# Patient Record
Sex: FEMALE | Race: WHITE | Employment: UNEMPLOYED | URBAN - METROPOLITAN AREA
[De-identification: names, ages, dates, MRNs, and addresses within clinical notes are randomized per-mention and may not be internally consistent; named-entity substitution may affect disease eponyms.]

---

## 2017-11-30 PROBLEM — M19.90 ARTHRITIS: Status: ACTIVE | Noted: 2017-11-30

## 2017-11-30 PROBLEM — F33.0 MILD EPISODE OF RECURRENT MAJOR DEPRESSIVE DISORDER (HCC): Status: ACTIVE | Noted: 2017-11-30

## 2017-11-30 PROBLEM — J44.9 CHRONIC OBSTRUCTIVE PULMONARY DISEASE (HCC): Status: ACTIVE | Noted: 2017-11-30

## 2018-04-09 ENCOUNTER — TELEPHONE (OUTPATIENT)
Dept: FAMILY MEDICINE CLINIC | Age: 63
End: 2018-04-09

## 2018-04-09 RX ORDER — ALPRAZOLAM 0.25 MG/1
0.25 TABLET ORAL 2 TIMES DAILY PRN
Qty: 60 TABLET | Refills: 2 | Status: CANCELLED | OUTPATIENT
Start: 2018-04-09 | End: 2018-05-09

## 2018-04-12 ENCOUNTER — OFFICE VISIT (OUTPATIENT)
Dept: FAMILY MEDICINE CLINIC | Age: 63
End: 2018-04-12
Payer: MEDICAID

## 2018-04-12 VITALS
WEIGHT: 121 LBS | HEART RATE: 84 BPM | HEIGHT: 67 IN | RESPIRATION RATE: 16 BRPM | SYSTOLIC BLOOD PRESSURE: 151 MMHG | DIASTOLIC BLOOD PRESSURE: 81 MMHG | OXYGEN SATURATION: 91 % | BODY MASS INDEX: 18.99 KG/M2

## 2018-04-12 DIAGNOSIS — F33.0 MILD EPISODE OF RECURRENT MAJOR DEPRESSIVE DISORDER (HCC): Primary | ICD-10-CM

## 2018-04-12 DIAGNOSIS — Z72.0 TOBACCO ABUSE: ICD-10-CM

## 2018-04-12 DIAGNOSIS — Z12.31 ENCOUNTER FOR SCREENING MAMMOGRAM FOR BREAST CANCER: ICD-10-CM

## 2018-04-12 DIAGNOSIS — I10 ESSENTIAL HYPERTENSION: ICD-10-CM

## 2018-04-12 DIAGNOSIS — J44.9 CHRONIC OBSTRUCTIVE PULMONARY DISEASE, UNSPECIFIED COPD TYPE (HCC): ICD-10-CM

## 2018-04-12 DIAGNOSIS — F17.210 NICOTINE DEPENDENCE, CIGARETTES, UNCOMPLICATED: ICD-10-CM

## 2018-04-12 DIAGNOSIS — Z12.11 SCREENING FOR COLON CANCER: ICD-10-CM

## 2018-04-12 PROCEDURE — 3017F COLORECTAL CA SCREEN DOC REV: CPT | Performed by: FAMILY MEDICINE

## 2018-04-12 PROCEDURE — G0296 VISIT TO DETERM LDCT ELIG: HCPCS | Performed by: FAMILY MEDICINE

## 2018-04-12 PROCEDURE — G8427 DOCREV CUR MEDS BY ELIG CLIN: HCPCS | Performed by: FAMILY MEDICINE

## 2018-04-12 PROCEDURE — G8926 SPIRO NO PERF OR DOC: HCPCS | Performed by: FAMILY MEDICINE

## 2018-04-12 PROCEDURE — 3014F SCREEN MAMMO DOC REV: CPT | Performed by: FAMILY MEDICINE

## 2018-04-12 PROCEDURE — 4004F PT TOBACCO SCREEN RCVD TLK: CPT | Performed by: FAMILY MEDICINE

## 2018-04-12 PROCEDURE — 3023F SPIROM DOC REV: CPT | Performed by: FAMILY MEDICINE

## 2018-04-12 PROCEDURE — G8420 CALC BMI NORM PARAMETERS: HCPCS | Performed by: FAMILY MEDICINE

## 2018-04-12 PROCEDURE — 99214 OFFICE O/P EST MOD 30 MIN: CPT | Performed by: FAMILY MEDICINE

## 2018-04-12 RX ORDER — NICOTINE 21 MG/24HR
1 PATCH, TRANSDERMAL 24 HOURS TRANSDERMAL EVERY 24 HOURS
Qty: 30 PATCH | Refills: 3 | Status: SHIPPED | OUTPATIENT
Start: 2018-04-12 | End: 2022-06-18

## 2018-04-12 ASSESSMENT — ENCOUNTER SYMPTOMS
BACK PAIN: 0
SINUS PRESSURE: 0
COUGH: 0
EYE PAIN: 0
ABDOMINAL PAIN: 0
SORE THROAT: 0
SHORTNESS OF BREATH: 0
CONSTIPATION: 0
DIARRHEA: 0

## 2018-04-25 ENCOUNTER — HOSPITAL ENCOUNTER (OUTPATIENT)
Dept: GENERAL RADIOLOGY | Age: 63
Discharge: HOME | End: 2018-04-27
Payer: MEDICAID

## 2018-04-25 DIAGNOSIS — Z12.31 ENCOUNTER FOR SCREENING MAMMOGRAM FOR BREAST CANCER: ICD-10-CM

## 2018-04-25 PROCEDURE — 77063 BREAST TOMOSYNTHESIS BI: CPT

## 2018-07-05 ENCOUNTER — TELEPHONE (OUTPATIENT)
Dept: FAMILY MEDICINE CLINIC | Age: 63
End: 2018-07-05

## 2018-07-05 RX ORDER — ALPRAZOLAM 0.25 MG/1
0.25 TABLET ORAL 2 TIMES DAILY PRN
Qty: 60 TABLET | Refills: 2 | Status: CANCELLED | OUTPATIENT
Start: 2018-07-05 | End: 2018-08-04

## 2019-08-23 ENCOUNTER — TELEPHONE (OUTPATIENT)
Dept: ADMINISTRATIVE | Age: 64
End: 2019-08-23

## 2019-08-23 NOTE — TELEPHONE ENCOUNTER
Spoke with patient to get her scheduled for a COPD check and patient refused stated she does not need to be seen at this time.

## 2022-06-18 ENCOUNTER — APPOINTMENT (OUTPATIENT)
Dept: CT IMAGING | Age: 67
End: 2022-06-18
Payer: MEDICAID

## 2022-06-18 ENCOUNTER — HOSPITAL ENCOUNTER (EMERGENCY)
Age: 67
Discharge: LEFT BEFORE DISCHARGE | End: 2022-06-19
Attending: EMERGENCY MEDICINE
Payer: MEDICAID

## 2022-06-18 ENCOUNTER — APPOINTMENT (OUTPATIENT)
Dept: GENERAL RADIOLOGY | Age: 67
End: 2022-06-18
Payer: MEDICAID

## 2022-06-18 DIAGNOSIS — R09.02 HYPOXIA: ICD-10-CM

## 2022-06-18 DIAGNOSIS — M79.89 RIGHT LEG SWELLING: Primary | ICD-10-CM

## 2022-06-18 DIAGNOSIS — M79.631 PAIN AND SWELLING OF RIGHT FOREARM: ICD-10-CM

## 2022-06-18 DIAGNOSIS — N63.10 BREAST MASS, RIGHT: ICD-10-CM

## 2022-06-18 DIAGNOSIS — M79.89 LEFT LEG SWELLING: ICD-10-CM

## 2022-06-18 DIAGNOSIS — J44.1 COPD EXACERBATION (HCC): ICD-10-CM

## 2022-06-18 DIAGNOSIS — M79.89 PAIN AND SWELLING OF RIGHT FOREARM: ICD-10-CM

## 2022-06-18 LAB
ALBUMIN SERPL-MCNC: 3.7 G/DL (ref 3.5–5.2)
ALP BLD-CCNC: 109 U/L (ref 35–104)
ALT SERPL-CCNC: 104 U/L (ref 0–32)
ANION GAP SERPL CALCULATED.3IONS-SCNC: 9 MMOL/L (ref 7–16)
AST SERPL-CCNC: 49 U/L (ref 0–31)
BASOPHILS ABSOLUTE: 0.06 E9/L (ref 0–0.2)
BASOPHILS RELATIVE PERCENT: 0.6 % (ref 0–2)
BILIRUB SERPL-MCNC: 1.4 MG/DL (ref 0–1.2)
BUN BLDV-MCNC: 23 MG/DL (ref 6–23)
CALCIUM SERPL-MCNC: 9.1 MG/DL (ref 8.6–10.2)
CHLORIDE BLD-SCNC: 93 MMOL/L (ref 98–107)
CO2: 35 MMOL/L (ref 22–29)
CREAT SERPL-MCNC: 1.3 MG/DL (ref 0.5–1)
EOSINOPHILS ABSOLUTE: 0.04 E9/L (ref 0.05–0.5)
EOSINOPHILS RELATIVE PERCENT: 0.4 % (ref 0–6)
GFR AFRICAN AMERICAN: 50
GFR NON-AFRICAN AMERICAN: 41 ML/MIN/1.73
GLUCOSE BLD-MCNC: 94 MG/DL (ref 74–99)
HCT VFR BLD CALC: 54.9 % (ref 34–48)
HEMOGLOBIN: 16.8 G/DL (ref 11.5–15.5)
IMMATURE GRANULOCYTES #: 0.03 E9/L
IMMATURE GRANULOCYTES %: 0.3 % (ref 0–5)
LYMPHOCYTES ABSOLUTE: 2.09 E9/L (ref 1.5–4)
LYMPHOCYTES RELATIVE PERCENT: 21.1 % (ref 20–42)
MCH RBC QN AUTO: 31.2 PG (ref 26–35)
MCHC RBC AUTO-ENTMCNC: 30.6 % (ref 32–34.5)
MCV RBC AUTO: 101.9 FL (ref 80–99.9)
MONOCYTES ABSOLUTE: 1.18 E9/L (ref 0.1–0.95)
MONOCYTES RELATIVE PERCENT: 11.9 % (ref 2–12)
NEUTROPHILS ABSOLUTE: 6.49 E9/L (ref 1.8–7.3)
NEUTROPHILS RELATIVE PERCENT: 65.7 % (ref 43–80)
PDW BLD-RTO: 16.7 FL (ref 11.5–15)
PLATELET # BLD: 176 E9/L (ref 130–450)
PMV BLD AUTO: 9.4 FL (ref 7–12)
POTASSIUM REFLEX MAGNESIUM: 3.6 MMOL/L (ref 3.5–5)
PRO-BNP: ABNORMAL PG/ML (ref 0–125)
RBC # BLD: 5.39 E12/L (ref 3.5–5.5)
SODIUM BLD-SCNC: 137 MMOL/L (ref 132–146)
TOTAL PROTEIN: 7.3 G/DL (ref 6.4–8.3)
TROPONIN, HIGH SENSITIVITY: 78 NG/L (ref 0–9)
TROPONIN, HIGH SENSITIVITY: 85 NG/L (ref 0–9)
WBC # BLD: 9.9 E9/L (ref 4.5–11.5)

## 2022-06-18 PROCEDURE — 2580000003 HC RX 258: Performed by: STUDENT IN AN ORGANIZED HEALTH CARE EDUCATION/TRAINING PROGRAM

## 2022-06-18 PROCEDURE — 99285 EMERGENCY DEPT VISIT HI MDM: CPT

## 2022-06-18 PROCEDURE — 96372 THER/PROPH/DIAG INJ SC/IM: CPT

## 2022-06-18 PROCEDURE — 80053 COMPREHEN METABOLIC PANEL: CPT

## 2022-06-18 PROCEDURE — 71275 CT ANGIOGRAPHY CHEST: CPT

## 2022-06-18 PROCEDURE — 36415 COLL VENOUS BLD VENIPUNCTURE: CPT

## 2022-06-18 PROCEDURE — 83880 ASSAY OF NATRIURETIC PEPTIDE: CPT

## 2022-06-18 PROCEDURE — 84484 ASSAY OF TROPONIN QUANT: CPT

## 2022-06-18 PROCEDURE — 6360000004 HC RX CONTRAST MEDICATION: Performed by: STUDENT IN AN ORGANIZED HEALTH CARE EDUCATION/TRAINING PROGRAM

## 2022-06-18 PROCEDURE — 93005 ELECTROCARDIOGRAM TRACING: CPT | Performed by: EMERGENCY MEDICINE

## 2022-06-18 PROCEDURE — 85025 COMPLETE CBC W/AUTO DIFF WBC: CPT

## 2022-06-18 PROCEDURE — 73630 X-RAY EXAM OF FOOT: CPT

## 2022-06-18 PROCEDURE — 73090 X-RAY EXAM OF FOREARM: CPT

## 2022-06-18 RX ORDER — SODIUM CHLORIDE 0.9 % (FLUSH) 0.9 %
10 SYRINGE (ML) INJECTION PRN
Status: COMPLETED | OUTPATIENT
Start: 2022-06-18 | End: 2022-06-18

## 2022-06-18 RX ADMIN — IOPAMIDOL 75 ML: 755 INJECTION, SOLUTION INTRAVENOUS at 23:24

## 2022-06-18 RX ADMIN — SODIUM CHLORIDE, PRESERVATIVE FREE 10 ML: 5 INJECTION INTRAVENOUS at 23:24

## 2022-06-18 ASSESSMENT — PAIN DESCRIPTION - FREQUENCY: FREQUENCY: INTERMITTENT

## 2022-06-18 ASSESSMENT — PAIN DESCRIPTION - PAIN TYPE: TYPE: ACUTE PAIN

## 2022-06-18 ASSESSMENT — PAIN DESCRIPTION - LOCATION: LOCATION: FOOT

## 2022-06-18 ASSESSMENT — PAIN DESCRIPTION - DESCRIPTORS: DESCRIPTORS: DULL

## 2022-06-18 ASSESSMENT — PAIN SCALES - GENERAL: PAINLEVEL_OUTOF10: 5

## 2022-06-18 ASSESSMENT — PAIN DESCRIPTION - ORIENTATION: ORIENTATION: RIGHT

## 2022-06-18 ASSESSMENT — PAIN - FUNCTIONAL ASSESSMENT: PAIN_FUNCTIONAL_ASSESSMENT: 0-10

## 2022-06-19 VITALS
HEART RATE: 63 BPM | DIASTOLIC BLOOD PRESSURE: 66 MMHG | SYSTOLIC BLOOD PRESSURE: 151 MMHG | TEMPERATURE: 98.1 F | OXYGEN SATURATION: 95 % | RESPIRATION RATE: 16 BRPM | WEIGHT: 110 LBS | BODY MASS INDEX: 17.27 KG/M2 | HEIGHT: 67 IN

## 2022-06-19 PROCEDURE — 6360000002 HC RX W HCPCS: Performed by: EMERGENCY MEDICINE

## 2022-06-19 PROCEDURE — 6370000000 HC RX 637 (ALT 250 FOR IP): Performed by: EMERGENCY MEDICINE

## 2022-06-19 RX ORDER — PREDNISONE 20 MG/1
40 TABLET ORAL DAILY
Qty: 8 TABLET | Refills: 0 | Status: SHIPPED | OUTPATIENT
Start: 2022-06-19 | End: 2022-06-23

## 2022-06-19 RX ORDER — ENOXAPARIN SODIUM 100 MG/ML
1 INJECTION SUBCUTANEOUS ONCE
Status: DISCONTINUED | OUTPATIENT
Start: 2022-06-19 | End: 2022-06-19 | Stop reason: CLARIF

## 2022-06-19 RX ORDER — ENOXAPARIN SODIUM 100 MG/ML
1 INJECTION SUBCUTANEOUS ONCE
Status: COMPLETED | OUTPATIENT
Start: 2022-06-19 | End: 2022-06-19

## 2022-06-19 RX ORDER — IPRATROPIUM BROMIDE AND ALBUTEROL SULFATE 2.5; .5 MG/3ML; MG/3ML
1 SOLUTION RESPIRATORY (INHALATION)
Status: DISCONTINUED | OUTPATIENT
Start: 2022-06-19 | End: 2022-06-19 | Stop reason: HOSPADM

## 2022-06-19 RX ORDER — PREDNISONE 20 MG/1
40 TABLET ORAL ONCE
Status: COMPLETED | OUTPATIENT
Start: 2022-06-19 | End: 2022-06-19

## 2022-06-19 RX ADMIN — ENOXAPARIN SODIUM 50 MG: 100 INJECTION SUBCUTANEOUS at 01:41

## 2022-06-19 RX ADMIN — IPRATROPIUM BROMIDE AND ALBUTEROL SULFATE 1 AMPULE: .5; 2.5 SOLUTION RESPIRATORY (INHALATION) at 02:10

## 2022-06-19 RX ADMIN — PREDNISONE 40 MG: 20 TABLET ORAL at 02:10

## 2022-06-19 NOTE — ED NOTES
While preparing patient for D/C patient's o2 sat 78-82% on room air. Patient denies feeling SOB. Patient placed on 3 LPM via NC. o2 sat now 94%. Dr. Fernande Simmonds notified.      Nasir Correia, [address] [place] [address]  06/19/22 4396

## 2022-06-19 NOTE — ED PROVIDER NOTES
HPI:  6/18/22, Time: 10:19 PM EDT         Karen Gaspar is a 78 y.o. female presenting to the ED for right foot swelling beginning 2 weeks ago. Symptoms have been moderate in severity and constant, worsened later in the day, better after elevating. Associated symptoms of pain to her foot. States she originally attributed her symptoms to dropping something onto her foot. She denies history of DVT/PE. No recent travel/immobilization, hemoptysis, syncope, chest pain, shortness of breath, cough, or fevers. No hormone use. No abdominal pain, emesis, diarrhea, dysuria, or black or bloody stools. She also states she noticed some mild intermittent swelling to her left lower leg as well as her right forearm. No trauma to her left leg or right arm. Also states she noticed a painful mass to her right breast about 2 months ago. She does not have a PCP, and she has not sought any medical care regarding this mass. No known history of cancer. No known history of CHF. Review of Systems:   Pertinent positives and negatives are stated within HPI, all other systems reviewed and are negative.          --------------------------------------------- PAST HISTORY ---------------------------------------------  Past Medical History:  has a past medical history of Anxiety, Arthritis, Chronic bronchitis ([address] Emphysema, and Walking pneumonia. Past Surgical History:  has a past surgical history that includes Appendectomy. Social History:  reports that she has been smoking. She has been smoking about 0.50 packs per day. She has never used smokeless tobacco. She reports that she does not drink alcohol and does not use drugs. Family History: family history includes Cancer in her mother; Jojo Kalyani in her father. The patients home medications have been reviewed. Allergies: Patient has no known allergies.     -------------------------------------------------- RESULTS -------------------------------------------------  All laboratory and radiology results have been personally reviewed by myself   LABS:  Results for orders placed or performed during the hospital encounter of 06/18/22   CBC with Auto Differential   Result Value Ref Range    WBC 9.9 4.5 - 11.5 E9/L    RBC 5.39 3.50 - 5.50 E12/L    Hemoglobin 16.8 (H) 11.5 - 15.5 g/dL    Hematocrit 54.9 (H) 34.0 - 48.0 %    .9 (H) 80.0 - 99.9 fL    MCH 31.2 26.0 - 35.0 pg    MCHC 30.6 (L) 32.0 - 34.5 %    RDW 16.7 (H) 11.5 - 15.0 fL    Platelets 803 430 - 704 E9/L    MPV 9.4 7.0 - 12.0 fL    Neutrophils % 65.7 43.0 - 80.0 %    Immature Granulocytes % 0.3 0.0 - 5.0 %    Lymphocytes % 21.1 20.0 - 42.0 %    Monocytes % 11.9 2.0 - 12.0 %    Eosinophils % 0.4 0.0 - 6.0 %    Basophils % 0.6 0.0 - 2.0 %    Neutrophils Absolute 6.49 1.80 - 7.30 E9/L    Immature Granulocytes # 0.03 E9/L    Lymphocytes Absolute 2.09 1.50 - 4.00 E9/L    Monocytes Absolute 1.18 (H) 0.10 - 0.95 E9/L    Eosinophils Absolute 0.04 (L) 0.05 - 0.50 E9/L    Basophils Absolute 0.06 0.00 - 0.20 E9/L   Comprehensive Metabolic Panel w/ Reflex to MG   Result Value Ref Range    Sodium 137 132 - 146 mmol/L    Potassium reflex Magnesium 3.6 3.5 - 5.0 mmol/L    Chloride 93 (L) 98 - 107 mmol/L    CO2 35 (H) 22 - 29 mmol/L    Anion Gap 9 7 - 16 mmol/L    Glucose 94 74 - 99 mg/dL    BUN 23 6 - 23 mg/dL    CREATININE 1.3 (H) 0.5 - 1.0 mg/dL    GFR Non-African American 41 >=60 mL/min/1.73    GFR African American 50     Calcium 9.1 8.6 - 10.2 mg/dL    Total Protein 7.3 6.4 - 8.3 g/dL    Albumin 3.7 3.5 - 5.2 g/dL    Total Bilirubin 1.4 (H) 0.0 - 1.2 mg/dL    Alkaline Phosphatase 109 (H) 35 - 104 U/L     (H) 0 - 32 U/L    AST 49 (H) 0 - 31 U/L   Troponin   Result Value Ref Range    Troponin, High Sensitivity 85 (H) 0 - 9 ng/L   Brain Natriuretic Peptide   Result Value Ref Range    Pro-BNP 28,781 (H) 0 - 125 pg/mL   Troponin   Result Value Ref Range    Troponin, High Sensitivity 78 (H) 0 - 9 ng/L   EKG 12 Lead   Result Value Ref Range    Ventricular Rate 88 BPM    Atrial Rate 88 BPM    P-R Interval 102 ms    QRS Duration 88 ms    Q-T Interval 326 ms    QTc Calculation (Bazett) 394 ms    P Axis 95 degrees    R Axis 47 degrees    T Axis -6 degrees       RADIOLOGY:  Interpreted by Radiologist.  XR FOOT RIGHT (MIN 3 VIEWS)   Final Result   No acute osseous abnormality. Dorsal soft tissue edema. XR RADIUS ULNA RIGHT (2 VIEWS)   Final Result   No acute osseous or soft tissue abnormality. CTA PULMONARY W CONTRAST   Final Result   No evidence of pulmonary embolism or acute pulmonary abnormality. Right breast asymmetrical density compared to the left breast with skin   thickening. US DUP LOWER EXTREMITY LEFT YAMILET    (Results Pending)   US DUP LOWER EXTREMITY RIGHT YAMILET    (Results Pending)   US DUP UPPER EXTREMITY RIGHT VENOUS    (Results Pending)       ------------------------- NURSING NOTES AND VITALS REVIEWED ---------------------------   The nursing notes within the ED encounter and vital signs as below have been reviewed. BP (!) 151/66   Pulse 63   Temp 98.1 °F (36.7 °C) (Temporal)   Resp 16   Ht 5' 7\" (1.702 m)   Wt 110 lb (49.9 kg)   SpO2 95%   BMI 17.23 kg/m²   Oxygen Saturation Interpretation: Normal      ---------------------------------------------------PHYSICAL EXAM--------------------------------------      Constitutional/General: Alert and oriented x3, well appearing, non toxic in NAD  Head: Normocephalic and atraumatic  Eyes: PERRL, EOMI  Mouth: Oropharynx clear, handling secretions, no trismus  Neck: Supple, full ROM,   Pulmonary: Lungs clear to auscultation bilaterally, no wheezes, rales, or rhonchi. Not in respiratory distress  Cardiovascular:  Regular rate and rhythm, no murmurs, gallops, or rubs.  2+ distal pulses  Breast: Right breast-hard tenderness mass to breast, no erythema or warmth, no open wounds, no crepitus  Abdomen: Soft, non tender, non distended,   Extremities: Moves all extremities x 4. Warm and well perfused. RLE-unilateral swelling, no wounds, tenderness to dorsum of foot, soft and easily compressible compartments, normal ROM, no warmth or erythema to joints, DP and PT pulses intact, normal sensation. LLE-no swelling or tenderness, soft and easily compressible compartments, normal ROM, no warmth or erythema to joints, DP and PT pulses intact, normal sensation. RUE-swelling to forearm with mild tenderness, strong radial pulse, no warmth or erythema, no wounds, normal ROM, no warmth or erythema to joints, soft and easily compressible compartments, normal sensation. Skin: warm and dry without rash  Neurologic: GCS 15, no focal motor or sensory deficits   Psych: Normal Affect. Behavior normal.      ------------------------------ ED COURSE/MEDICAL DECISION MAKING----------------------  Medications   ipratropium-albuterol (DUONEB) nebulizer solution 1 ampule (1 ampule Inhalation Given 6/19/22 0210)   iopamidol (ISOVUE-370) 76 % injection 75 mL (75 mLs IntraVENous Given 6/18/22 2324)   sodium chloride flush 0.9 % injection 10 mL (10 mLs IntraVENous Given 6/18/22 2324)   enoxaparin (LOVENOX) injection 50 mg (50 mg SubCUTAneous Given 6/19/22 0141)   predniSONE (DELTASONE) tablet 40 mg (40 mg Oral Given 6/19/22 0210)       Medical Decision Making/ED COURSE:   Patient is a 52-year-old female presenting with RLE swelling, RUE swelling, and right breast mass. In the ED, patient was hemodynamically stable and afebrile. On exam, she had unilateral swelling to her RLE and swelling to her right forearm. She had no wounds or evidence of cellulitis. She was neurovascularly intact with no evidence of compartment syndrome or acute limb ischemia. Labs, xrays, and CTA chest obtained. I am unable to obtain ultrasounds at the freestanding ED in the evening. I reviewed and interpreted labs. Labs were significant for an elevated creatinine at 1.3. There are no prior labs for comparison.   She also had an elevated troponin at 85, repeat 78. BNP was elevated at 28,781. She has nonspecific EKG changes. She has no chest pain or shortness of breath. ACS less likely. Troponin elevation may be due to her elevated creatinine as well. Patient did develop hypoxia in the ED so I suspect an element of the troponin elevation is secondary to demand ischemia. She was treated with duonebs and steroids due to concern for COPD exacerbation with some mild improvement. Patient was noted to have thickening of the skin consistent with her right breast mass on examination but otherwise no acute pulmonary abnormalities. No evidence of PE.  X-rays were unremarkable. We originally discussed possible outpatient follow-up; however this was prior to the patient developing hypoxia. I had multiple conversations with the patient regarding my concerns with her hypoxia. I recommended admission to the hospital.  Patient is declining and states that she wants to follow-up as an outpatient. I discussed with her that I am concerned that she may not receive oxygen to her vital organs and suffer serious disability or death if she does not receive further emergent treatment, and she continues to decline admission. Patient would like to leave against medical advice. Patient is awake, alert, and answering questions appropriately. Patient has medical decision making capacity. Patient does not appear clinically intoxicated. Patient is not suicidal or homicidal. Understands risks of leaving against medical advice, including respiratory failure, serious disability, and death. Encouraged to follow with their doctor as soon as possible and return to the emergency department if they would like further treatment. I have provided the patient with multiple referrals including general surgery for her breast mass as well as a primary care doctor. I have written a prescription for prednisone for suspected COPD exacerbation.   She states that she has inhalers at home which she can use. I read the patient stat outpatient scripts for bilateral lower extremity Dopplers as well as a Doppler to her right upper extremity due to extremity swelling. She has been given 1 dose of Lovenox in the ED. She was encouraged to return to the ED at any time if she like further treatment. Patient remained hemodynamically stable throughout ED course. ED Course as of 06/19/22 0244   Sat Jun 18, 2022   2245 EKG: This EKG is signed and interpreted by me. Rate: 88  Rhythm: Sinus  Interpretation: Sinus rhythm, short NM interval, left atrial enlargement, normal Qtc, normal axis, nonspecific T wave inversions/flattening in V3-V6  Comparison: changes compared to previous EKG   [JA]   Vanda Jun 19, 2022   0122 Discussed with patient possible DVT. Unable to get US at freestanding ED. Plan for one dose of lovenox and follow up first thing in AM at [facility] for STAT bilateral LE US and [address] [place] [address] Road. Patient denies history of bleeding. Denies intracranial hemorrhage, GI bleed, bleeding AVM, recent surgery, recent trauma, or active bleeding. Understands risks versus benefits of anticoagulation. Patient agreeable to treatment with anticoagulation. [JA]   0204 I was asked by nursing to reevaluate the patient because the patient became newly hypoxic on reevaluation. She was placed on nasal cannula and she improved. She has no baseline oxygen requirement. On my reevaluation, she is in no acute distress though she is markedly diminished with wheezing. She states she does have albuterol inhalers at home. Prednisone and duo nebs have been ordered. Will reevaluate. Patient is adamant that she would like to be discharged home. I discussed with the patient that I am concerned about her new hypoxia and I want to make sure that it improves prior to discharge. [JA]   0236 I reevaluated the patient. She is saturating 89-90% at rest with no exertion.   She sounds mildly improved on auscultation of lungs but is still diminished and wheezy. I offered additional breathing treatments, patient has declined. She states that they make her feel shaky. I discussed with the patient that at this time I am uncomfortable with discharging her especially given her new hypoxia and concern for COPD exacerbation. I recommended admission. Patient is declining and states that she wants to be discharged home. She states she has inhalers at home. She will sign out AMA. [JA]      ED Course User Index  [JA] Bo Braxton MD       New Prescriptions    PREDNISONE (DELTASONE) 20 MG TABLET    Take 2 tablets by mouth daily for 4 days     Bo Braxton MD      Counseling: The emergency provider has spoken with the patient and discussed todays results, in addition to providing specific details for the plan of care and counseling regarding the diagnosis and prognosis. Questions are answered at this time and they are agreeable with the plan.      --------------------------------- IMPRESSION AND DISPOSITION ---------------------------------    IMPRESSION  1. Right leg swelling    2. Pain and swelling of right forearm    3. Breast mass, right    4. Left leg swelling    5. COPD exacerbation ([address]    6. Hypoxia        DISPOSITION  Disposition: AMA  Patient condition is fair      NOTE: This report was transcribed using voice recognition software.  Every effort was made to ensure accuracy; however, inadvertent computerized transcription errors may be present    I, Bo Braxton MD, am the primary provider of this record       Bo Braxton MD  06/19/22 [id number]

## 2022-06-19 NOTE — ED NOTES
Patient does not wish to stay to be monitored. She would like to sign out AMA. Dr. Amanda Parham notified.      [facility]  06/19/22 [id number]

## 2022-06-20 LAB
EKG ATRIAL RATE: 88 BPM
EKG P AXIS: 95 DEGREES
EKG P-R INTERVAL: 102 MS
EKG Q-T INTERVAL: 326 MS
EKG QRS DURATION: 88 MS
EKG QTC CALCULATION (BAZETT): 394 MS
EKG R AXIS: 47 DEGREES
EKG T AXIS: -6 DEGREES
EKG VENTRICULAR RATE: 88 BPM

## 2022-07-12 ENCOUNTER — APPOINTMENT (OUTPATIENT)
Dept: ULTRASOUND IMAGING | Age: 67
DRG: 264 | End: 2022-07-12
Payer: MEDICARE

## 2022-07-12 LAB
ALBUMIN SERPL-MCNC: 3.5 G/DL (ref 3.5–5.2)
ALP BLD-CCNC: 95 U/L (ref 35–104)
ALT SERPL-CCNC: 144 U/L (ref 0–32)
ANION GAP SERPL CALCULATED.3IONS-SCNC: 11 MMOL/L (ref 7–16)
ANISOCYTOSIS: ABNORMAL
AST SERPL-CCNC: 24 U/L (ref 0–31)
BASOPHILS ABSOLUTE: 0 E9/L (ref 0–0.2)
BASOPHILS RELATIVE PERCENT: 0.2 % (ref 0–2)
BILIRUB SERPL-MCNC: 1.1 MG/DL (ref 0–1.2)
BUN BLDV-MCNC: 20 MG/DL (ref 6–23)
CALCIUM SERPL-MCNC: 8.6 MG/DL (ref 8.6–10.2)
CHLORIDE BLD-SCNC: 100 MMOL/L (ref 98–107)
CO2: 30 MMOL/L (ref 22–29)
CREAT SERPL-MCNC: 1.1 MG/DL (ref 0.5–1)
EOSINOPHILS ABSOLUTE: 0 E9/L (ref 0.05–0.5)
EOSINOPHILS RELATIVE PERCENT: 0 % (ref 0–6)
GFR AFRICAN AMERICAN: >60
GFR NON-AFRICAN AMERICAN: 50 ML/MIN/1.73
GLUCOSE BLD-MCNC: 134 MG/DL (ref 74–99)
HCT VFR BLD CALC: 50.5 % (ref 34–48)
HEMOGLOBIN: 14.9 G/DL (ref 11.5–15.5)
LYMPHOCYTES ABSOLUTE: 0.28 E9/L (ref 1.5–4)
LYMPHOCYTES RELATIVE PERCENT: 6.1 % (ref 20–42)
MCH RBC QN AUTO: 30.2 PG (ref 26–35)
MCHC RBC AUTO-ENTMCNC: 29.5 % (ref 32–34.5)
MCV RBC AUTO: 102.4 FL (ref 80–99.9)
MONOCYTES ABSOLUTE: 0.24 E9/L (ref 0.1–0.95)
MONOCYTES RELATIVE PERCENT: 5.2 % (ref 2–12)
NEUTROPHILS ABSOLUTE: 4.18 E9/L (ref 1.8–7.3)
NEUTROPHILS RELATIVE PERCENT: 88.7 % (ref 43–80)
NUCLEATED RED BLOOD CELLS: 0.9 /100 WBC
PDW BLD-RTO: 15.3 FL (ref 11.5–15)
PLATELET # BLD: 168 E9/L (ref 130–450)
PMV BLD AUTO: 9.7 FL (ref 7–12)
POTASSIUM SERPL-SCNC: 3.8 MMOL/L (ref 3.5–5)
PRO-BNP: ABNORMAL PG/ML (ref 0–125)
RBC # BLD: 4.93 E12/L (ref 3.5–5.5)
SODIUM BLD-SCNC: 141 MMOL/L (ref 132–146)
TOTAL PROTEIN: 6.6 G/DL (ref 6.4–8.3)
TROPONIN, HIGH SENSITIVITY: 101 NG/L (ref 0–9)
WBC # BLD: 4.7 E9/L (ref 4.5–11.5)

## 2022-07-12 PROCEDURE — 80053 COMPREHEN METABOLIC PANEL: CPT

## 2022-07-12 PROCEDURE — 83880 ASSAY OF NATRIURETIC PEPTIDE: CPT

## 2022-07-12 PROCEDURE — 93971 EXTREMITY STUDY: CPT | Performed by: RADIOLOGY

## 2022-07-12 PROCEDURE — 93005 ELECTROCARDIOGRAM TRACING: CPT | Performed by: PHYSICIAN ASSISTANT

## 2022-07-12 PROCEDURE — 36415 COLL VENOUS BLD VENIPUNCTURE: CPT

## 2022-07-12 PROCEDURE — 85025 COMPLETE CBC W/AUTO DIFF WBC: CPT

## 2022-07-12 PROCEDURE — 99285 EMERGENCY DEPT VISIT HI MDM: CPT

## 2022-07-12 PROCEDURE — 93971 EXTREMITY STUDY: CPT

## 2022-07-12 PROCEDURE — 84484 ASSAY OF TROPONIN QUANT: CPT

## 2022-07-12 PROCEDURE — 93970 EXTREMITY STUDY: CPT

## 2022-07-12 ASSESSMENT — PAIN - FUNCTIONAL ASSESSMENT: PAIN_FUNCTIONAL_ASSESSMENT: NONE - DENIES PAIN

## 2022-07-12 NOTE — ED NOTES
Department of Emergency Medicine  FIRST PROVIDER TRIAGE NOTE             Independent MLP           7/12/22  5:44 PM EDT    Date of Encounter: 7/12/22   MRN: [id number]      HPI: Sebastien Villareal is a 78 y.o. female who presents to the ED for Leg Swelling (Bilat legs and right arm edema, stated she has a large lump in breast)     Pt presenting with b/l lower extremity swelling, right arm swelling, sob, cp x 1 month. Right breast mass x 1 year. ROS: Negative for abd pain or back pain. PE: Gen Appearance/Constitutional: alert  HEENT: NC/NT. PERRLA,  Airway patent. Initial Plan of Care: All treatment areas with department are currently occupied. Plan to order/Initiate the following while awaiting opening in ED: labs, EKG and imaging studies.   Initiate Treatment-Testing, Proceed toTreatment Area When Bed Available for ED Attending/MLP to Continue Care    Electronically signed by LEANN Valentin   DD: 7/12/22       LEANN Valentin  07/12/22 0123

## 2022-07-13 ENCOUNTER — APPOINTMENT (OUTPATIENT)
Dept: CT IMAGING | Age: 67
DRG: 264 | End: 2022-07-13
Payer: MEDICARE

## 2022-07-13 ENCOUNTER — HOSPITAL ENCOUNTER (INPATIENT)
Age: 67
LOS: 3 days | Discharge: HOME | DRG: 264 | End: 2022-07-16
Attending: EMERGENCY MEDICINE | Admitting: INTERNAL MEDICINE
Payer: MEDICARE

## 2022-07-13 DIAGNOSIS — R60.0 EDEMA OF RIGHT UPPER EXTREMITY: ICD-10-CM

## 2022-07-13 DIAGNOSIS — I50.811 ACUTE RIGHT HEART FAILURE (HCC): Primary | ICD-10-CM

## 2022-07-13 DIAGNOSIS — N63.10 MASS OF RIGHT BREAST: ICD-10-CM

## 2022-07-13 DIAGNOSIS — R93.89 ABNORMAL ULTRASOUND: ICD-10-CM

## 2022-07-13 DIAGNOSIS — R60.0 BILATERAL LOWER EXTREMITY EDEMA: ICD-10-CM

## 2022-07-13 DIAGNOSIS — R92.8 OTHER ABNORMAL AND INCONCLUSIVE FINDINGS ON DIAGNOSTIC IMAGING OF BREAST: ICD-10-CM

## 2022-07-13 DIAGNOSIS — J96.01 ACUTE RESPIRATORY FAILURE WITH HYPOXIA (HCC): ICD-10-CM

## 2022-07-13 DIAGNOSIS — R77.8 ELEVATED TROPONIN: ICD-10-CM

## 2022-07-13 DIAGNOSIS — R92.8 ABNORMAL MAMMOGRAM: ICD-10-CM

## 2022-07-13 LAB
EKG ATRIAL RATE: 96 BPM
EKG P AXIS: 103 DEGREES
EKG P-R INTERVAL: 118 MS
EKG Q-T INTERVAL: 352 MS
EKG QRS DURATION: 80 MS
EKG QTC CALCULATION (BAZETT): 444 MS
EKG R AXIS: 87 DEGREES
EKG T AXIS: 46 DEGREES
EKG VENTRICULAR RATE: 96 BPM
LV EF: 50 %
LVEF MODALITY: NORMAL
REASON FOR REJECTION: NORMAL
REJECTED TEST: NORMAL
TROPONIN, HIGH SENSITIVITY: 106 NG/L (ref 0–9)
TROPONIN, HIGH SENSITIVITY: 125 NG/L (ref 0–9)

## 2022-07-13 PROCEDURE — 99223 1ST HOSP IP/OBS HIGH 75: CPT | Performed by: INTERNAL MEDICINE

## 2022-07-13 PROCEDURE — 2580000003 HC RX 258: Performed by: STUDENT IN AN ORGANIZED HEALTH CARE EDUCATION/TRAINING PROGRAM

## 2022-07-13 PROCEDURE — 6370000000 HC RX 637 (ALT 250 FOR IP): Performed by: STUDENT IN AN ORGANIZED HEALTH CARE EDUCATION/TRAINING PROGRAM

## 2022-07-13 PROCEDURE — 71275 CT ANGIOGRAPHY CHEST: CPT

## 2022-07-13 PROCEDURE — 93306 TTE W/DOPPLER COMPLETE: CPT

## 2022-07-13 PROCEDURE — APPSS60 APP SPLIT SHARED TIME 46-60 MINUTES: Performed by: CLINICAL NURSE SPECIALIST

## 2022-07-13 PROCEDURE — 6370000000 HC RX 637 (ALT 250 FOR IP): Performed by: CLINICAL NURSE SPECIALIST

## 2022-07-13 PROCEDURE — 36415 COLL VENOUS BLD VENIPUNCTURE: CPT

## 2022-07-13 PROCEDURE — 6360000002 HC RX W HCPCS: Performed by: INTERNAL MEDICINE

## 2022-07-13 PROCEDURE — 6360000004 HC RX CONTRAST MEDICATION: Performed by: STUDENT IN AN ORGANIZED HEALTH CARE EDUCATION/TRAINING PROGRAM

## 2022-07-13 PROCEDURE — 84484 ASSAY OF TROPONIN QUANT: CPT

## 2022-07-13 PROCEDURE — 2060000000 HC ICU INTERMEDIATE R&B

## 2022-07-13 RX ORDER — SODIUM CHLORIDE 0.9 % (FLUSH) 0.9 %
5-40 SYRINGE (ML) INJECTION EVERY 12 HOURS SCHEDULED
Status: DISCONTINUED | OUTPATIENT
Start: 2022-07-13 | End: 2022-07-16 | Stop reason: HOSPADM

## 2022-07-13 RX ORDER — CARVEDILOL 6.25 MG/1
6.25 TABLET ORAL 2 TIMES DAILY WITH MEALS
Status: DISCONTINUED | OUTPATIENT
Start: 2022-07-13 | End: 2022-07-16 | Stop reason: HOSPADM

## 2022-07-13 RX ORDER — SODIUM CHLORIDE 0.9 % (FLUSH) 0.9 %
10 SYRINGE (ML) INJECTION ONCE
Status: COMPLETED | OUTPATIENT
Start: 2022-07-13 | End: 2022-07-13

## 2022-07-13 RX ORDER — ONDANSETRON 4 MG/1
4 TABLET, ORALLY DISINTEGRATING ORAL EVERY 8 HOURS PRN
Status: DISCONTINUED | OUTPATIENT
Start: 2022-07-13 | End: 2022-07-16 | Stop reason: HOSPADM

## 2022-07-13 RX ORDER — ACETAMINOPHEN 325 MG/1
650 TABLET ORAL EVERY 6 HOURS PRN
Status: DISCONTINUED | OUTPATIENT
Start: 2022-07-13 | End: 2022-07-16 | Stop reason: HOSPADM

## 2022-07-13 RX ORDER — IBUPROFEN 200 MG
400 TABLET ORAL 2 TIMES DAILY PRN
COMMUNITY
End: 2022-07-16

## 2022-07-13 RX ORDER — ASPIRIN 81 MG/1
324 TABLET, CHEWABLE ORAL ONCE
Status: COMPLETED | OUTPATIENT
Start: 2022-07-13 | End: 2022-07-13

## 2022-07-13 RX ORDER — SODIUM CHLORIDE 9 MG/ML
INJECTION, SOLUTION INTRAVENOUS PRN
Status: DISCONTINUED | OUTPATIENT
Start: 2022-07-13 | End: 2022-07-16 | Stop reason: HOSPADM

## 2022-07-13 RX ORDER — ENOXAPARIN SODIUM 100 MG/ML
40 INJECTION SUBCUTANEOUS DAILY
Status: DISCONTINUED | OUTPATIENT
Start: 2022-07-14 | End: 2022-07-16 | Stop reason: HOSPADM

## 2022-07-13 RX ORDER — ACETAMINOPHEN 650 MG/1
650 SUPPOSITORY RECTAL EVERY 6 HOURS PRN
Status: DISCONTINUED | OUTPATIENT
Start: 2022-07-13 | End: 2022-07-16 | Stop reason: HOSPADM

## 2022-07-13 RX ORDER — SODIUM CHLORIDE 0.9 % (FLUSH) 0.9 %
5-40 SYRINGE (ML) INJECTION PRN
Status: DISCONTINUED | OUTPATIENT
Start: 2022-07-13 | End: 2022-07-16 | Stop reason: HOSPADM

## 2022-07-13 RX ORDER — FUROSEMIDE 10 MG/ML
20 INJECTION INTRAMUSCULAR; INTRAVENOUS 2 TIMES DAILY
Status: DISCONTINUED | OUTPATIENT
Start: 2022-07-13 | End: 2022-07-16

## 2022-07-13 RX ORDER — ONDANSETRON 2 MG/ML
4 INJECTION INTRAMUSCULAR; INTRAVENOUS EVERY 6 HOURS PRN
Status: DISCONTINUED | OUTPATIENT
Start: 2022-07-13 | End: 2022-07-16 | Stop reason: HOSPADM

## 2022-07-13 RX ORDER — POLYETHYLENE GLYCOL 3350 17 G/17G
17 POWDER, FOR SOLUTION ORAL DAILY PRN
Status: DISCONTINUED | OUTPATIENT
Start: 2022-07-13 | End: 2022-07-16 | Stop reason: HOSPADM

## 2022-07-13 RX ADMIN — Medication 10 ML: at 11:58

## 2022-07-13 RX ADMIN — CARVEDILOL 6.25 MG: 6.25 TABLET, FILM COATED ORAL at 18:07

## 2022-07-13 RX ADMIN — ASPIRIN 324 MG: 81 TABLET, CHEWABLE ORAL at 14:01

## 2022-07-13 RX ADMIN — IOPAMIDOL 60 ML: 755 INJECTION, SOLUTION INTRAVENOUS at 11:58

## 2022-07-13 RX ADMIN — FUROSEMIDE 20 MG: 10 INJECTION, SOLUTION INTRAMUSCULAR; INTRAVENOUS at 19:17

## 2022-07-13 ASSESSMENT — PAIN DESCRIPTION - LOCATION: LOCATION: BACK

## 2022-07-13 ASSESSMENT — PAIN SCALES - GENERAL: PAINLEVEL_OUTOF10: 5

## 2022-07-13 NOTE — CONSULTS
Facility-Administered Medications: perflutren lipid microspheres (DEFINITY) injection 1.65 mg, 1.5 mL, IntraVENous, ONCE PRN    Allergies:  Patient has no known allergies. Social History:    She has been a smoker since age 13, and now smokes about 1/2 PPD; she smoked 1 PPD for most of the past 50 years. She denies alcohol or illicit drug use. She lives alone in an apartment. Family History:   Family History   Problem Relation Age of Onset    Cancer Mother     Lung Cancer Father        Review of Systems:   · Constitutional: Denies fatigue, fevers, chills or night sweats  · ENT: Denies headaches, bleeding gums, or epistaxis  · Cardiovascular: Denies chest pain or palpitations. Lower extremity and right arm edema as above. · Respiratory: Denies dyspnea, cough, orthopnea or PND. · Gastrointestinal: Denies nausea, vomiting, abdominal pain or dark/bloody stools. · Genitourinary: Denies urgency, dysuria or hematuria. · Musculoskeletal: Denies falls, myalgias or arthralgias. · Integumentary: Denies rash or ulcerations. · Neurological: Denies dizziness, syncope, numbness or tingling  · Psychiatric: Positive for anxiety and depression. · Endocrine: Denies temperature intolerance or recent weight change. · Hematologic/Lymphatic: Denies abnormal bruising or bleeding. Physical Exam:   BP (!) 157/88   Pulse 86   Temp 98 °F (36.7 °C)   Resp 15   Ht 5' 7\" (1.702 m)   Wt 115 lb (52.2 kg)   SpO2 91%   BMI 18.01 kg/m²   CONST:  Well developed, thin elderly lady who appears of stated age. Awake, alert and cooperative. No apparent distress. HEENT:   Head- Normocephalic, atraumatic   Throat- Oral mucosa pink and moist  Neck-  No stridor, trachea midline, no jugular venous distention or carotid bruit. CHEST: Chest symmetrical and non-tender to palpation. Respirations unlabored.    RESPIRATORY:  Breath sounds clear throughout   CARDIOVASCULAR:    Heart Ausculation- Regular rate and rhythm, no murmur, s3, s4 or rub   PV: No lower extremity edema. No varicosities. Pedal pulses palpable  ABDOMEN: Soft, non-tender to light palpation. Bowel sounds present. No palpable masses  MS: Good muscle strength and tone. No atrophy or abnormal movements. : Deferred  SKIN: Warm and dry   NEURO / PSYCH: Oriented to person, place and time. Speech clear and appropriate. Follows all commands. Pleasant affect     Telemetry: SR  ECG: SR, 96 bpm with no acute ST-T wave changes     No intake or output data in the 24 hours ending 07/13/22 1419    Labs:   CBC:   Recent Labs     07/12/22  1754   WBC 4.7   HGB 14.9   HCT 50.5*        BMP:   Recent Labs     07/12/22  1754      K 3.8   CO2 30*   BUN 20   CREATININE 1.1*   LABGLOM 50   CALCIUM 8.6     proBNP:   Recent Labs     07/12/22  1754   PROBNP 17,463*     LIVER PROFILE:  Recent Labs     07/12/22  1754   AST 24   *   LABALBU 3.5     Component      Latest Ref Rng & Units 7/12/2022 6/18/2022           5:54 PM 10:23 PM   Pro-BNP      0 - 125 pg/mL 17,463 (H) 28,781 (H)     Component      Latest Ref Rng & Units 7/13/2022 7/12/2022 6/18/2022 6/18/2022           5:03 AM  5:54 PM 11:26 PM 10:23 PM   Troponin, High Sensitivity      0 - 9 ng/L 106 (H) 101 (H) 78 (H) 85 (H)       CTA 7/12/2022:  Impression:     1. There is no pulmonary embolus. 2. Irregular soft tissue mass within the right breast measuring 6 cm x 4 cm x   5.4 cm.  There is adjacent skin thickening as well as soft tissue edema   within the right lateral chest wall.  There are enlarged lymph nodes within   the right axilla.  This finding is highly suggestive of breast malignancy. 3. There is no appreciable adjacent metastatic disease to the right ribs. 4. Emphysematous changes. Pennelope Free is no pulmonary infiltrate   5. Pleuroparenchymal scarring and atelectasis seen within the left lung base. 6. Cardiomegaly with significant right atrial enlargement.      Upper extremity ultrasound 7/12/2022:  Impression:     Within the visualized vessels there is no evidence for deep venous thrombosis     Lower extremity ultrasound 7/12/2022:  Impression:     Within the visualized vessels there is no evidence for deep venous thrombosis     Assessment:   1. Elevated troponin:  · No symptoms of angina and no acute EKG changes. · Multiple risk factors for CAD  · Acute right-sided CHF    2. Acute right-sided CHF / severe PHTN    3. Right breast mass    4. Lower extremity and right upper extremity edema: no evidence of DVT on ultrasound    5. Hypertension -- uncontrolled    6. Mild renal insufficiency    7. Severely dilated right atrium, mild mitral regurgitation, mild aortic regurgitation, moderate tricuspid regurgitation. 8. Ongoing tobacco abuse (0.5-1 PPD since age 13) / COPD    Treatment Plan:   1. Echocardiogram (completed; see results below)    2. Add Coreg 6.25 mg twice daily    3. IV diuresis / monitor I&O's and BMP closely    4. May proceed with surgical work up of right breast mass from a cardiology standpoint    5. Will reassess for ischemic work-up once clinically improved    6. Aggressive risk factor modifications / tobacco cessation    The above assessment and treatment plan was made in collaboration with Dr. Rose Elizabeth.       Electronically signed by ANGELITA Hampton on 7/13/2022 at 2:19 PM     ________________________________________________________________________  I independently interviewed and examined the patient. I have reviewed the above documentation completed by the HADLEY. Please see my additional contributions to the HPI, physical exam, and assessment / medical decision making. HPI, ROS, PMH, PSH, [address], SH, and medications independently reviewed (agree; see above documentation)    History of Present Illness:  Currently with no chest pain, respiratory distress, or palpitations. ++LE edema and RUE edema. SR on EKG and telemetry.     Review of Systems:   Cardiac: As per HPI  General: No fever, chills  Pulmonary: As per HPI  HEENT: No visual disturbances, difficult swallowing  GI: No nausea, vomiting  : No dysuria, hematuria  Endocrine: No thyroid disease or DM  Musculoskeletal: RESENDIZ x 4, no focal motor deficits  Skin: Intact, no rashes  Neuro: No headache, seizures  Psych: Currently with no depression, anxiety    Physical Exam:  BP (!) 161/100   Pulse 78   Temp 98 °F (36.7 °C)   Resp 16   Ht 5' 7\" (1.702 m)   Wt 115 lb (52.2 kg)   SpO2 96%   BMI 18.01 kg/m²   Wt Readings from Last 3 Encounters:   07/12/22 115 lb (52.2 kg)   06/18/22 110 lb (49.9 kg)   04/12/18 121 lb (54.9 kg)     Appearance: Awake, alert, no acute respiratory distress  Skin: Intact, no rash  Head: Normocephalic, atraumatic  Eyes: EOMI, no conjunctival erythema  ENMT: No pharyngeal erythema, MMM, no rhinorrhea  Neck: Supple, no carotid bruits  Lungs: Decreased BS B/L, no wheezing  Cardiac: Regular rate and rhythm, +Q0U5, 2/6 systolic murmur  Abdomen: Soft, nontender, +bowel sounds  Extremities: Moves all extremities x 4, ++lower extremity edema, RUE swelling  Neurologic: No focal motor deficits apparent, normal mood and affect    Laboratory Tests:  Recent Labs     07/12/22  1754      K 3.8      CO2 30*   BUN 20   CREATININE 1.1*   GLUCOSE 134*   CALCIUM 8.6     No results found for: MG  Recent Labs     07/12/22  1754   ALKPHOS 95   *   AST 24   PROT 6.6   BILITOT 1.1   LABALBU 3.5     Recent Labs     07/12/22  1754   WBC 4.7   RBC 4.93   HGB 14.9   HCT 50.5*   .4*   MCH 30.2   MCHC 29.5*   RDW 15.3*      MPV 9.7     No results found for: CKTOTAL, CKMB, CKMBINDEX, TROPONINI  Recent Labs     07/12/22  1754 07/13/22  0503 07/13/22  1534   TROPHS 101* 106* 125*     No results found for: TSHFT4, TSH  No results found for: LABA1C  No results found for: EAG  No results found for: CHOL  No results found for: TRIG  No results found for: HDL  No results found for: LDLCALC, LDLCHOLESTEROL  No results found for: LABVLDL, VLDL  No results found for: CHOLHDLRATIO  Recent Labs     07/12/22  1754   PROBNP 17,463*       EKG personally reviewed: SR, rate 96, NSSTT changes    Telemetry personally reviewed (date: 7/13/2022): SR, 90's    Echocardiogram reviewed: 7/13/22 (Dr. Eva Sarmiento)   Ejection fraction is visually estimated at 50%. Dilated right ventricle and reduced right ventricular function (TAPSE 1.0 cm). Indeterminate diastolic function. Severely dilated right atrium. Mild mitral regurgitation. Mild aortic regurgitation. Moderate tricuspid regurgitation. RV-RA gradient is estimated at 58 mmHg. - Echocardiogram results outlined above  - IV diuresis / monitor I&O's and BMP closely  - Add coreg 6.25 mg BID  - Will reassess for ischemic work-up once clinically improved  - She may proceed with surgical work up of right breast mass from a cardiology standpoint (surgery note reviewed)  - Aggressive risk factor modifications / tobacco cessation  - Monitor telemetry    Thank you for allowing me to participate in your patient's care. Please feel free to contact me if you have any questions or concerns.     Tina Dick MD  [facility]) Cardiology

## 2022-07-13 NOTE — CONSULTS
GENERAL SURGERY  CONSULT NOTE  7/13/2022    Physician Consulted: Dr. Angi Corcoran  Reason for Consult: Breast Mass Right   Referring Physician: Dr. Denis Delgado     HPI  Ce Padgett is a 78 y.o. female who presents on the advice of her children for evaluation of right breast mass, right arm edema, and B/L LE edema. She states the breast mass has been present for 7 months and she was attempting to make dietary changes prior to seeking medical attention. She states it hurts intermittently. Over the past couple weeks she has had worsening RUE edema and more recently B/L LE edema. It appears she was seen in ED a month ago for similar issues and had CTA Chest showing large breast mass and axillary LN enlargement but she left AMA at this time. Her last reported mammogram was 3 years ago and was [facility] 1, she was meant to followup with PCP but did not. She has been experiencing weight gain. No evidence of DVTs on this admission, no evidence of PE. She has also had new skin lesions on her forearms that appear to be 1cm ecchymotic lesions. She also was scheduled for screening colonoscopy which she did not attend. She has had no CNS symptoms. She has mother, father, and sister with hx of cancer (brain, lung, cervical respectively per patient)    EKG on monitor in ED appears irregularly irregular and she has elevated troponins. Afebrile, hemodynamically stable   R breast mass approx 6cm x 4cm x 5.4cm, skin dimpling around mass. No erythema or p'eau d'orange noted. There is nipple inversion present. R axillary lymph nodes palpable on examination. No warmth to breast. There was no L axillary LN palpable or inguinal LN palpable. R arm and B/L LE with pitting edema.      ALT//24  Troponin's 101->106 Pro-BNP 17, 463        Past Medical History:   Diagnosis Date    Anxiety     Arthritis     Chronic bronchitis ([address]     Emphysema     according to xray report 2013    Walking pneumonia        Past Surgical History: Procedure Laterality Date    APPENDECTOMY         Medications Prior to Admission    Prior to Admission medications    Medication Sig Start Date End Date Taking? Authorizing Provider   ibuprofen (ADVIL;MOTRIN) 200 MG tablet Take 400 mg by mouth 2 times daily as needed for Pain   Yes Historical Provider, MD       No Known Allergies    Family History   Problem Relation Age of Onset    Cancer Mother     Lung Cancer Father        Social History     Tobacco Use    Smoking status: Current Every Day Smoker     Packs/day: 0.50    Smokeless tobacco: Never Used   Vaping Use    Vaping Use: Never used   Substance Use Topics    Alcohol use: No    Drug use: No         Review of Systems: pertinent ROS listed in HPI, all others negative       PHYSICAL EXAM:    Vitals:    07/13/22 1509   BP: (!) 172/93   Pulse: 88   Resp: 17   Temp:    SpO2: 94%       GENERAL:  NAD. A&Ox3. HEAD:  Normocephalic. Atraumatic. EYES:   No scleral icterus. PERRL. EOM in tact, visual acuity normal.   BREAST: R breast mass approx 6cm x 4cm x 5.4cm, skin dimpling around mass. No erythema or p'eau d'orange noted. There is nipple inversion present. R axillary lymph nodes palpable on examination. No warmth to breast. There was no L axillary LN palpable or inguinal LN palpable. LUNGS:  No increased work of breathing on RA. CARDIOVASCULAR: Irregularly irregular rhythm on monitor, HR 88  ABDOMEN:  Soft, non-distended, non-tender. No guarding, rigidity, rebound. EXTREMITIES:   MAEx4. Atraumatic. R arm and B/L LE with pitting edema. SKIN:  Warm and dry  NEUROLOGIC:  GCS 15  RECTAL: No palpable masses or blood noted      ASSESSMENT/PLAN:  78 y.o. female with R breast mass, RUE edema, and LAD R axilla, concerning for breast malignancy with mildly elevated ALT, no neurologic symptoms. Patient will require work-up for her breast mass including ultrasound, biopsies, CT Chest, Abdomen, Pelvis in order to determine pathology and for planning. Appreciate Cardiology recs for heart failure and elevated troponins    Plan discussed with Dr. Janel Cardona.     Jey Sanchez MD  Surgery Resident PGY-2  7/13/2022  3:34 PM

## 2022-07-13 NOTE — ED NOTES
Patients grandson stated that patient is going to sign out and than come back by ems to get to a room sooner. Patient education provided.       Cato Favre, RN  07/13/22 3165

## 2022-07-13 NOTE — ED PROVIDER NOTES
Department of Emergency Medicine   ED Provider Note  Admit Date/RoomTime: 7/13/2022 10:33 AM  ED Room: 21/21          History of Present Illness:  7/13/22, Time: 10:39 AM EDT         Omkar Sanders is a 78 y.o. female with history of tobacco use disorder, presenting to the ED for bilateral lower extremity swelling, right breast mass, beginning 1 month ago. The complaint has been persistent, moderate in severity, and worsened by nothing. Patient states that for the past month she has noticed a mass in her right breast as well as bilateral lower extremity swelling, for the past week she has had swelling of the distal right upper extremity. She does have pain in the right upper extremity. No history of DVT or PE. She was seen about 2 weeks ago for similar issues, was mildly hypoxic at that time, mildly elevated troponin and BNP, she states that she got scared by what the physician was telling her and signed out AMA. She is very scared of going to the doctor. She does not follow with a PCP, does not take any medications. She does smoke about a half a pack per day, but has been cutting down on her smoking. She denies any shortness of breath. She endorses intermittent lightheadedness, no syncope. She endorses intermittent nausea but no vomiting. She denies any fevers or chills or abdominal pain, denies any chest pain. Review of Systems:     Pertinent positives and negatives are stated within HPI. 10 point ROS otherwise negative.        --------------------------------------------- PAST HISTORY ---------------------------------------------  Past Medical History:  has a past medical history of Anxiety, Arthritis, Chronic bronchitis ([address] Emphysema, and Walking pneumonia. Past Surgical History:  has a past surgical history that includes Appendectomy. Social History:  reports that she has been smoking. She has been smoking about 0.50 packs per day.  She has never used smokeless tobacco. She reports that she does not drink alcohol and does not use drugs. Family History: family history includes Cancer in her mother; Onel Marin in her father. The patients home medications have been reviewed. Allergies: Patient has no known allergies. ---------------------------------------------------PHYSICAL EXAM--------------------------------------    Constitutional/General: AAO to person/place/time/purpose, NAD, no labored breathing  Head: Normocephalic and atraumatic  Eyes: EOMI, conjunctiva normal, sclera non icteric  Mouth: Moist mucous membranes, uvula midline  Neck: Supple, no stridor, no meningeal signs  Respiratory: Crackles bilateral lung bases, no respiratory distress, no significant wheezes, no accessory muscle use  Cardiovascular:  Regular rate. Regular rhythm. No murmurs, no gallops, or rubs. 2+ distal pulses. Equal extremity pulses. Chest: No chest wall tenderness or deformity  Breast: Large hard palpable irregular mass of the right breast, approximately 4 x 4 cm, just under the nipple. GI:  Abdomen Soft, Non tender, Non distended. No rebound, guarding, or rigidity. No pulsatile masses. Musculoskeletal: Moves all extremities x 4. Warm and well perfused, no clubbing, cyanosis. 3+ pitting edema right distal upper extremity, stopping at the mid humerus . 3+ pitting edema bilateral dorsal aspect of feet and pretibial region. Capillary refill <3 seconds  Integument: skin warm and dry. Neurologic: GCS 15, no focal deficits, symmetric strength 5/5 in the major muscle groups of upper and lower extremities bilaterally  Psychiatric: Normal Affect      -------------------------------------------------- RESULTS -------------------------------------------------  I have personally reviewed all laboratory and imaging results for this patient. Results are listed below.      LABS:  Results for orders placed or performed during the hospital encounter of 07/13/22   CBC with Auto Differential   Result Value Ref Range    WBC 4.7 4.5 - 11.5 E9/L    RBC 4.93 3.50 - 5.50 E12/L    Hemoglobin 14.9 11.5 - 15.5 g/dL    Hematocrit 50.5 (H) 34.0 - 48.0 %    .4 (H) 80.0 - 99.9 fL    MCH 30.2 26.0 - 35.0 pg    MCHC 29.5 (L) 32.0 - 34.5 %    RDW 15.3 (H) 11.5 - 15.0 fL    Platelets 008 798 - 092 E9/L    MPV 9.7 7.0 - 12.0 fL    Neutrophils % 88.7 (H) 43.0 - 80.0 %    Lymphocytes % 6.1 (L) 20.0 - 42.0 %    Monocytes % 5.2 2.0 - 12.0 %    Eosinophils % 0.0 0.0 - 6.0 %    Basophils % 0.2 0.0 - 2.0 %    Neutrophils Absolute 4.18 1.80 - 7.30 E9/L    Lymphocytes Absolute 0.28 (L) 1.50 - 4.00 E9/L    Monocytes Absolute 0.24 0.10 - 0.95 E9/L    Eosinophils Absolute 0.00 (L) 0.05 - 0.50 E9/L    Basophils Absolute 0.00 0.00 - 0.20 E9/L    nRBC 0.9 /100 WBC    Anisocytosis 3+    Comprehensive Metabolic Panel   Result Value Ref Range    Sodium 141 132 - 146 mmol/L    Potassium 3.8 3.5 - 5.0 mmol/L    Chloride 100 98 - 107 mmol/L    CO2 30 (H) 22 - 29 mmol/L    Anion Gap 11 7 - 16 mmol/L    Glucose 134 (H) 74 - 99 mg/dL    BUN 20 6 - 23 mg/dL    CREATININE 1.1 (H) 0.5 - 1.0 mg/dL    GFR Non-African American 50 >=60 mL/min/1.73    GFR African American >60     Calcium 8.6 8.6 - 10.2 mg/dL    Total Protein 6.6 6.4 - 8.3 g/dL    Albumin 3.5 3.5 - 5.2 g/dL    Total Bilirubin 1.1 0.0 - 1.2 mg/dL    Alkaline Phosphatase 95 35 - 104 U/L     (H) 0 - 32 U/L    AST 24 0 - 31 U/L   Troponin   Result Value Ref Range    Troponin, High Sensitivity 101 (H) 0 - 9 ng/L   Brain Natriuretic Peptide   Result Value Ref Range    Pro-BNP 17,463 (H) 0 - 125 pg/mL   Troponin   Result Value Ref Range    Troponin, High Sensitivity 106 (H) 0 - 9 ng/L   SPECIMEN REJECTION   Result Value Ref Range    Rejected Test TRP     Reason for Rejection see below    EKG 12 Lead   Result Value Ref Range    Ventricular Rate 96 BPM    Atrial Rate 96 BPM    P-R Interval 118 ms    QRS Duration 80 ms    Q-T Interval 352 ms    QTc Calculation (Bazett) 444 ms    P Axis 103 degrees    R Axis 87 degrees    T Axis 46 degrees       RADIOLOGY:  Interpreted by Radiologist unless otherwise specified  CTA PULMONARY W CONTRAST   Final Result   1. There is no pulmonary embolus. 2. Irregular soft tissue mass within the right breast measuring 6 cm x 4 cm x   5.4 cm. There is adjacent skin thickening as well as soft tissue edema   within the right lateral chest wall. There are enlarged lymph nodes within   the right axilla. This finding is highly suggestive of breast malignancy. 3. There is no appreciable adjacent metastatic disease to the right ribs. 4. Emphysematous changes. There is no pulmonary infiltrate   5. Pleuroparenchymal scarring and atelectasis seen within the left lung base. 6. Cardiomegaly with significant right atrial enlargement. US DUP UPPER EXTREMITY RIGHT VENOUS   Final Result   Within the visualized vessels there is no evidence for deep venous   thrombosis               US DUP LOWER EXTREMITIES BILATERAL VENOUS   Final Result   Within the visualized vessels there is no evidence for deep venous   thrombosis                     EKG:    See ED Course for EKG documentation        ------------------------- NURSING NOTES AND VITALS REVIEWED ---------------------------   The nursing notes within the ED encounter and vital signs as below have been reviewed by myself. BP (!) 135/102   Pulse 85   Temp 98 °F (36.7 °C)   Resp 16   Ht 5' 7\" (1.702 m)   Wt 115 lb (52.2 kg)   SpO2 94%   BMI 18.01 kg/m²   Oxygen Saturation Interpretation: Normal    The patients available past medical records and past encounters were reviewed.         ------------------------------ ED COURSE/MEDICAL DECISION MAKING----------------------  Medications   aspirin chewable tablet 324 mg (has no administration in time range)   sodium chloride flush 0.9 % injection 10 mL (10 mLs IntraVENous Given 7/13/22 1158)   iopamidol (ISOVUE-370) 76 % injection 60 mL (60 mLs IntraVENous Given 7/13/22 1158)            Medical Decision Making: This is a 72-year-old female with history of tobacco use, presenting to the emerged department for multiple complaints including extremity swelling, right breast mass. On exam patient with irregular hard breast mass, concerning for malignancy. Patient with significant pitting edema in the bilateral lower extremities in the right upper extremity. Ultrasounds were obtained of the extremities without evidence of DVT. Patient with significantly elevated troponin, greater than 100, no acute ischemic changes on EKG. Patient not hypoxic, no respiratory distress. She is normotensive, afebrile, not tachycardic. Given concern for metastatic disease or pulmonary embolism given markedly elevated BNP and troponin, CTA obtained of the chest.  CTA demonstrates dilated right atrium, findings concerning for malignant breast mass, but no evidence of metastasis or pulmonary embolism. Patient with no chest pain or shortness of breath here, but given markedly elevated troponin, will give aspirin and plan to admit for cardiology consultation and further work-up, treatment, monitoring. See ED COURSE for additional MDM. Re-Evaluations:             ED Course as of 07/13/22 1321   Wed Jul 13, 2022   1037 EKG: This EKG is signed and interpreted by me.     Rate: 96  Rhythm: Sinus  Interpretation: no acute changes and non-specific EKG, borderline mild ST depressions in inferior leads  Comparison: stable as compared to patient's most recent EKG   [RH]   1318 Discussed case with Dr. Mary Gay, agreed to accept patient [RH]      ED Course User Index  [RH] [address], DO         This patient's ED course included: a personal history and physicial examination, re-evaluation prior to disposition, multiple bedside re-evaluations, IV medications, cardiac monitoring and continuous pulse oximetry    This patient has remained hemodynamically stable during their ED course. Consultations:  See ED Course    Counseling: The emergency provider has spoken with the patient and discussed todays results, in addition to providing specific details for the plan of care and counseling regarding the diagnosis and prognosis. Questions are answered at this time and they are agreeable with the plan.       --------------------------------- IMPRESSION AND DISPOSITION ---------------------------------    IMPRESSION  1. Elevated troponin    2. Bilateral lower extremity edema    3. Edema of right upper extremity    4. Mass of right breast        DISPOSITION  Disposition: Admit to telemetry  Patient condition is stable    NOTE: This report was transcribed using voice recognition software. Every effort was made to ensure accuracy; however, inadvertent computerized transcription errors may be present. Also please note that patient was seen and examined by attending physician. Plan of care and disposition discussed with attending physician and they were immediately available or present for all procedures performed.        -- Kristyn Munguia D.O. PGY-3     Resident Physician     Emergency Medicine      7/13/2022 1:21 PM        [address] DO  Resident  07/13/22 1341

## 2022-07-14 PROBLEM — N63.10 BREAST MASS, RIGHT: Status: ACTIVE | Noted: 2022-07-14

## 2022-07-14 LAB
ANION GAP SERPL CALCULATED.3IONS-SCNC: 9 MMOL/L (ref 7–16)
BASOPHILS ABSOLUTE: 0.03 E9/L (ref 0–0.2)
BASOPHILS RELATIVE PERCENT: 0.5 % (ref 0–2)
BUN BLDV-MCNC: 12 MG/DL (ref 6–23)
CALCIUM SERPL-MCNC: 7.8 MG/DL (ref 8.6–10.2)
CHLORIDE BLD-SCNC: 98 MMOL/L (ref 98–107)
CHOLESTEROL, TOTAL: 155 MG/DL (ref 0–199)
CO2: 29 MMOL/L (ref 22–29)
CREAT SERPL-MCNC: 0.8 MG/DL (ref 0.5–1)
EOSINOPHILS ABSOLUTE: 0.15 E9/L (ref 0.05–0.5)
EOSINOPHILS RELATIVE PERCENT: 2.4 % (ref 0–6)
GFR AFRICAN AMERICAN: >60
GFR NON-AFRICAN AMERICAN: >60 ML/MIN/1.73
GLUCOSE BLD-MCNC: 72 MG/DL (ref 74–99)
HCT VFR BLD CALC: 49.9 % (ref 34–48)
HDLC SERPL-MCNC: 48 MG/DL
HEMOGLOBIN: 14.9 G/DL (ref 11.5–15.5)
IMMATURE GRANULOCYTES #: 0.01 E9/L
IMMATURE GRANULOCYTES %: 0.2 % (ref 0–5)
LDL CHOLESTEROL CALCULATED: 89 MG/DL (ref 0–99)
LYMPHOCYTES ABSOLUTE: 2.22 E9/L (ref 1.5–4)
LYMPHOCYTES RELATIVE PERCENT: 35.2 % (ref 20–42)
MAGNESIUM: 1.8 MG/DL (ref 1.6–2.6)
MCH RBC QN AUTO: 30.4 PG (ref 26–35)
MCHC RBC AUTO-ENTMCNC: 29.9 % (ref 32–34.5)
MCV RBC AUTO: 101.8 FL (ref 80–99.9)
MONOCYTES ABSOLUTE: 0.77 E9/L (ref 0.1–0.95)
MONOCYTES RELATIVE PERCENT: 12.2 % (ref 2–12)
NEUTROPHILS ABSOLUTE: 3.13 E9/L (ref 1.8–7.3)
NEUTROPHILS RELATIVE PERCENT: 49.5 % (ref 43–80)
PDW BLD-RTO: 15 FL (ref 11.5–15)
PLATELET # BLD: 148 E9/L (ref 130–450)
PMV BLD AUTO: 9.3 FL (ref 7–12)
POTASSIUM SERPL-SCNC: 3.8 MMOL/L (ref 3.5–5)
RBC # BLD: 4.9 E12/L (ref 3.5–5.5)
SODIUM BLD-SCNC: 136 MMOL/L (ref 132–146)
TRIGL SERPL-MCNC: 89 MG/DL (ref 0–149)
TSH SERPL DL<=0.05 MIU/L-ACNC: 2.51 UIU/ML (ref 0.27–4.2)
VLDLC SERPL CALC-MCNC: 18 MG/DL
WBC # BLD: 6.3 E9/L (ref 4.5–11.5)

## 2022-07-14 PROCEDURE — 99233 SBSQ HOSP IP/OBS HIGH 50: CPT | Performed by: INTERNAL MEDICINE

## 2022-07-14 PROCEDURE — 2060000000 HC ICU INTERMEDIATE R&B

## 2022-07-14 PROCEDURE — 97530 THERAPEUTIC ACTIVITIES: CPT

## 2022-07-14 PROCEDURE — 84443 ASSAY THYROID STIM HORMONE: CPT

## 2022-07-14 PROCEDURE — 2580000003 HC RX 258: Performed by: INTERNAL MEDICINE

## 2022-07-14 PROCEDURE — 6360000002 HC RX W HCPCS: Performed by: INTERNAL MEDICINE

## 2022-07-14 PROCEDURE — 85025 COMPLETE CBC W/AUTO DIFF WBC: CPT

## 2022-07-14 PROCEDURE — 80061 LIPID PANEL: CPT

## 2022-07-14 PROCEDURE — 6370000000 HC RX 637 (ALT 250 FOR IP): Performed by: INTERNAL MEDICINE

## 2022-07-14 PROCEDURE — 97165 OT EVAL LOW COMPLEX 30 MIN: CPT

## 2022-07-14 PROCEDURE — 83735 ASSAY OF MAGNESIUM: CPT

## 2022-07-14 PROCEDURE — 36415 COLL VENOUS BLD VENIPUNCTURE: CPT

## 2022-07-14 PROCEDURE — 80048 BASIC METABOLIC PNL TOTAL CA: CPT

## 2022-07-14 PROCEDURE — 99223 1ST HOSP IP/OBS HIGH 75: CPT | Performed by: SURGERY

## 2022-07-14 RX ADMIN — Medication 10 ML: at 08:35

## 2022-07-14 RX ADMIN — ACETAMINOPHEN 325MG 650 MG: 325 TABLET ORAL at 20:54

## 2022-07-14 RX ADMIN — FUROSEMIDE 20 MG: 10 INJECTION, SOLUTION INTRAMUSCULAR; INTRAVENOUS at 08:35

## 2022-07-14 RX ADMIN — Medication 10 ML: at 21:00

## 2022-07-14 RX ADMIN — Medication 5 ML: at 00:00

## 2022-07-14 RX ADMIN — FUROSEMIDE 20 MG: 10 INJECTION, SOLUTION INTRAMUSCULAR; INTRAVENOUS at 17:15

## 2022-07-14 ASSESSMENT — PAIN DESCRIPTION - DESCRIPTORS: DESCRIPTORS: DISCOMFORT

## 2022-07-14 ASSESSMENT — PAIN SCALES - GENERAL
PAINLEVEL_OUTOF10: 0
PAINLEVEL_OUTOF10: 0
PAINLEVEL_OUTOF10: 5

## 2022-07-14 ASSESSMENT — EJECTION FRACTION
EF_SOURCE: 2D ECHO
EF_VALUE: 50%

## 2022-07-14 ASSESSMENT — PAIN DESCRIPTION - ORIENTATION: ORIENTATION: RIGHT;LEFT

## 2022-07-14 ASSESSMENT — PAIN DESCRIPTION - LOCATION: LOCATION: LEG

## 2022-07-14 NOTE — PROGRESS NOTES
Hospitalist Progress Note      SYNOPSIS: Patient admitted on 2022 for evaluation of right upper extremity swelling, right breast mass and lower extremity swelling    SUBJECTIVE:    Patient seen and examined at bedside. Reports her right arm/leg swelling is better. Denies any cough, chest pain, abdominal pain, nausea or vomiting. Records reviewed. Stable overnight. No other overnight issues reported. Temp (24hrs), Av.6 °F (35.9 °C), Min:96.2 °F (35.7 °C), Max:96.9 °F (36.1 °C)    DIET: ADULT DIET; Regular; Low Sodium (2 gm)  CODE: Full Code    Intake/Output Summary (Last 24 hours) at 2022 1536  Last data filed at 2022 1444  Gross per 24 hour   Intake 480 ml   Output 1800 ml   Net -1320 ml       OBJECTIVE:    /74   Pulse 72   Temp 96.9 °F (36.1 °C) (Temporal)   Resp 17   Ht 5' 7\" (1.702 m)   Wt 115 lb (52.2 kg)   SpO2 93%   BMI 18.01 kg/m²     General appearance: No apparent distress, appears stated age and cooperative. Breast exam-not done  Respiratory: Bilateral air entry fair. No wheezing or crackles. Cardiovascular: Regular rate rhythm, normal S1-S2  Abdomen: Soft, nontender, nondistended  Musculoskeletal: Bilateral lower extremity peripheral edema 2+  Neurologic: awake, alert and following commands     Assessment and plan    Patient, 67-year-old female with past medical history of known right breast mass, hypertension, COPD, anxiety/depression, was admitted on 2022 for evaluation of right upper extremity swelling/right breast mass and worsening lower extremity swelling. Patient was evaluated by general surgery and planning for right breast mass biopsy. CT chest suggestive of right breast mass along with enlarged right axillary lymph nodes highly suggestive of breast malignancy. Also seen by cardiology for acute CHF exacerbation.   Echo shows EF 50%/severe pulmonary hypertension    ASSESSMENT:    Right upper extremity edema likely secondary to lymphatic obstruction due to right breast mass, concern for malignancy  Acute right-sided CHF exacerbation/severe pulmonary hypertension  Elevated troponin-unlikely ACS, likely demand ischemia  Mild DIANN-resolved  Hypertension  Nicotine dependence     PLAN:    General surgery evaluation appreciated. Planning for right breast mass biopsy  Continue IV diuresis  Strict I's and O's, daily weights  Echocardiogram noted as above  Continue Coreg  DVT prophylaxis-Lovenox subcu on hold    Disposition-likely discharge home in 1 to 2 days after right breast mass biopsy and cardiology recommendations for CHF. Medications:  REVIEWED DAILY    Infusion Medications    sodium chloride       Scheduled Medications    carvedilol  6.25 mg Oral BID WC    furosemide  20 mg IntraVENous BID    sodium chloride flush  5-40 mL IntraVENous 2 times per day    [Held by provider] enoxaparin  40 mg SubCUTAneous Daily     PRN Meds: perflutren lipid microspheres, sodium chloride flush, sodium chloride, ondansetron **OR** ondansetron, polyethylene glycol, acetaminophen **OR** acetaminophen    Labs:     Recent Labs     07/12/22 1754 07/14/22  0531   WBC 4.7 6.3   HGB 14.9 14.9   HCT 50.5* 49.9*    148       Recent Labs     07/12/22 1754 07/14/22  0531    136   K 3.8 3.8    98   CO2 30* 29   BUN 20 12   CREATININE 1.1* 0.8   CALCIUM 8.6 7.8*       Recent Labs     07/12/22 1754   PROT 6.6   ALKPHOS 95   *   AST 24   BILITOT 1.1       No results for input(s): INR in the last 72 hours. No results for input(s): Genoveva Malinarch in the last 72 hours.     Chronic labs:    Lab Results   Component Value Date    CHOL 155 07/14/2022    TRIG 89 07/14/2022    HDL 48 07/14/2022    LDLCALC 89 07/14/2022    TSH 2.510 07/14/2022       Radiology: REVIEWED DAILY    +++++++++++++++++++++++++++++++++++++++++++++++++  Julio Talbot MD  [facility] Physician - [address] [place], [place]  +++++++++++++++++++++++++++++++++++++++++++++++++  NOTE: This report was transcribed using voice recognition software. Every effort was made to ensure accuracy; however, inadvertent computerized transcription errors may be present.

## 2022-07-14 NOTE — CONSULTS
Patient currently admitted with diagnosis of Diastolic heart failure. Patient was alert and oriented during the consultation. She was engaged and asked appropriate questions throughout the education session. She is agreeable to self monitoring, following a low sodium diet, and utilizing the CHF clinic fro symptoms management when needed. Scheduling with the CHF clinic and Cardiology Yes. Patient does not have a PCP, reviewed P Primary care options, she would like to schedule with [facility], office information provided on discharge instruction and patient advised to call on day of hospital discharge to schedule. Unfortunately, patient was found to have a right breast mass with enlarged axillary lymph nodes on CTA. Outpatient follow up with [facility]/Dr. Giuliana Cherry advised. Contacted Dr. Karen Grimm office they are aware of patient case and are following for appropriate follow up procedure. Future Appointments   Date Time Provider Erum Salinas   7/29/2022  9:00 AM ANGELITA Canales - CNP [facility]       Barriers to Care:  Contributing risk factors for Heart Failure are identified as overwhelmed by complexity of regimen, patient is being faced with two new life altering diagnoses. The patient is ordered:  Diet: ADULT DIET; Regular;  Low Sodium (2 gm)   Sodium controlled diet Yes  Fluid restriction daily ordered (fluid restriction recommended if patient is hyponatremic and/or diuretic is initiated or increased) No  FR:   Daily Weights:   Patient Vitals for the past 96 hrs (Last 3 readings):   Weight   07/14/22 0329 115 lb (52.2 kg)   07/13/22 2345 115 lb (52.2 kg)   07/12/22 1745 115 lb (52.2 kg)     I/O:     Intake/Output Summary (Last 24 hours) at 7/14/2022 1354  Last data filed at 7/14/2022 0813  Gross per 24 hour   Intake 240 ml   Output 1500 ml   Net -1260 ml              We reviewed the introduction to Heart Failure, the HF zones, signs and symptoms to report on day 1 of onset, medications, medication compliance, the importance of obtaining daily weights, following a low sodium diet, reading food labels for the sodium content, keeping physician appointments, and smoking cessation. We discussed writing / tracking daily weights on a calendar / log because a 5 pound gain in 1 week can sneak up if you are not tracking it. I advised patient they can reduce the risk for Heart Failure exacerbations by modifying / controlling the risk factors. We discussed self-managed care which includes the following:  to take medications as prescribed, report any intolerable side effects of medications to the cardiologist / doctor, do not just stop taking the medication; follow a cardiac heart healthy / low sodium diet; weigh yourself daily, exercise regularly- per doctor recommendation and not to smoke or use an excess amount of alcohol. We discussed calling the cardiologist / doctor with a weight gain of 3 pounds in one day or a total of 5 pounds or more in one week. Also, if you should have a significant weight loss of 3# or more in one day to call the doctor, they may need to decrease or hold the diuretic dose. On days you feels nauseated and not eating / drinking, having emesis or diarrhea,  informed to call the cardiologist  / doctor, they may need to decrease or hold diuretic to avoid dehydration. I stressed the importance of informing their cardiologist the first day of onset of any of the signs and symptoms in the \"Yellow Zone\" of the HF Zones. Patient verbalizes understanding. Greater than 30 minutes was spent educating patient. The Heart Failure Booklet given to the patient with additional patient education addressing:  · What is Heart Failure? · Things You Can Do to Live Well with HF  · How to Take Your Medications  · How to Eat Less Salt  · [place] its Salt?   · Exercising Well with Heart Failure  · Signs and symptoms of HF to report  · Weight Yourself Each Day  · Home Self Management- activity, weight tracking, taking medications as prescribed, meals /diet planning (sodium and fluid restriction), how to read food labels, keeping physician follow ups, smoking cessation, follow the Heart Failure Zones  · The Heart Failure zones  · Every Dose Every Day      Instructed  to call 911 if you have any of the following symptoms:    ·    Struggling to breathe unrelieved with rest,  ·    Having chest pain  ·    Have confusion or cant think clearly          Tigre Whitehead RN BSN, RN  Heart Failure [address] [facility] (CHF) AHA GUIDELINES  (Must be completed for Primary Diagnosis CHF or History of CHF)    Discharge Plan:  I placed the Heart Failure Home Instructions in patient's discharge instructions. Per Heart Failure GWTG, the patient should have a follow-up appointment made within 7 days of discharge. New Diagnosis Yes    ECHO Results most recent:  No results found for: LVEF, LVEFMODE                                     Social History     Tobacco Use   Smoking Status Current Every Day Smoker    Packs/day: 0.50   Smokeless Tobacco Never Used          There is no immunization history on file for this patient.        Angiotensin-Converting-Enzyme (ACE) inhibitor ordered:  [] Yes  [x] No (specify contraindication):    [] Contraindicated  [] Hypotensive patient who was at immediate risk of cardiogenic shock  [] Hospitalized patient who experienced marked azotemia  [] Other Contraindications  [x] Not Eligible  [] Not Tolerant  [] Patient Reason  [] System Reason  [] Other Reason    Angiotensin II receptor blockers (ARB) ordered:  [] Yes  [x] No (specify contraindication):    [] Contraindicated  [] Hypotensive patient who was at immediate risk of cardiogenic shock  [] Hospitalized patient who experienced marked azotemia  [] Other Contraindications    ARNI - Angiotensin Receptor Neprilysin Inhibitor ordered:  [] Yes  [x] No (specify contraindication):    [] ACE inhibitor use within the prior 36 hours  [] Allergy  [] Hyperkalemia  [] Hypotension  [] Renal dysfunction defined as creatinine > 2.5 mg/dL in men or > 2.0 mg/dL in women  [] Other Contraindications  [] Not Eligible  [] Not Tolerant  [] Patient Reason  []System Reason  []Other Reason      Beta Blocker (Carvedilol, Metoprolol Succinate, or Bisoprolol) ordered:    [x] Yes Toprol XL  [] No (specify contraindication):    [] Contraindicated  [] Asthma  [] Fluid Overload  [] Low Blood Pressure  [] Patient recently treated with an intravenous positive inotropic agent  [] Other Contraindications  [] Not Eligible  [] Not Tolerant  [] Patient Reason  [] System Reason    SGLT2 Inhibitor ordered:  [] Yes  [x] No (specify contraindication):    [] Contraindicated  [] Patient currently on dialysis  [] Ketoacidosis  [] Known hypersensitivity to the medication  [] Type I diabetes (not approved for use in patients with Type I diabetes due to increased risk of ketoacidosis)  [] Other Contraindications  [] Not Eligible  [] Not Tolerant  [] Patient Reason  [x] System Reason  [] Other Reason    Aldosterone Antagonist ordered:  [] Yes  [x] No (specify contraindication):    [] Contraindicated  [] Allergy due to aldosterone receptor antagonist  [] Hyperkalemia  [] Renal dysfunction defined as creatinine >2.5 mg/dL in men or >2.0 mg/dL in women.   [] Other contraindications  [x] Not Eligible  [] Not Tolerant  [] Patient Reason  [] System Reason  [] Other Reason

## 2022-07-14 NOTE — DISCHARGE INSTRUCTIONS
HEART FAILURE  / CONGESTIVE HEART FAILURE  DISCHARGE INSTRUCTIONS:  GUIDELINES TO FOLLOW AT HOME    Self- Managed Care:     MEDICATIONS:  · Take your medication as directed. If you are experiencing any side effects, inform your doctor, Do not stop taking any of your medications without letting your doctor know. · Check with your doctor before taking any over-the-counter medications / herbal / or dietary supplements. They may interfere with your other medications. · Do not take ibuprofen (Advil or Motrin) and naproxen (Aleve) without talking to your doctor first. They could make your heart failure worse. WEIGHT MONITORING:  ·  Weigh yourself everyday (with the same scale) around the same time of the day and write it down. (you can chart them on a calendar or keep track of them on paper. ·  Notify your doctor of a weight gain of 3 pounds or more in 1 day   OR a total of 5 pounds or more in 1 week    · Take your weight record to your doctor visits  · Also, the same goes if you loose more than 3# in one day, let your heart doctor know. DIET:   Cardiac heart healthy diet- Low saturated / low trans fat, no added salt, caffeine restricted, Low sodium diet-   No more than 2,000mg (2 grams) of salt / sodium per day (which equals to a little less than  a teaspoon of salt)  · If your doctor wants you on a fluid restriction. ..it is usually recommended a fluid limit of 2,000cc -  Fluid restriction- 2,000 ml (milliliters) = 64 ounces = you can have 8 glasses of fluid per day (each glass 8 ounces)    · Follow a low salt diet - avoid using salt at the table, avoid / limit use of canned soups, processed / packaged foods, salted snacks, olives and pickles. Do not use a salt substitute without checking with your doctor, they may contain a high amount of potassioum. (Mrs. Amanda Cabezas is safe to use).     · Limit the use of alcohol       CALL YOUR DOCTOR THE FIRST DAY YOU NOTICE ANY OF THESE SYMPTOMS:  · You have a weight gain of 3 pounds or more in 1 day         OR 5 pounds or more in one week  · More shortness of breath  · More swelling of your stomach, legs, ankles or feet  · Feeling more tired, No energy  · Dry hacky cough  · Dizziness  · More chest pain / discomfort       (CALL 911 IF ANY OF THE FOLLOWING OCCURS  · Chest pain (not relieved with nitroglycerine, if you have been prescribed this medication)  · Severe shortness of breath  · Faint / Pass out  · Confusion / cannot think clearly  · If symptoms get worse           SMOKING - TOBACCO USE:  * IF YOU SMOKE - STOP! Kick the habit. [address] Program is offered at [facility] [address] [address] [facility]. Call [phone] extension [phone] for more information. ACTIVITY:   (Ask your doctor when you will be able to return to work and before starting any exercise program.  Do not drive unless unless your doctor has given you permission to do so). · Start light exercise. Even if you can only do a small amount, exercise will help you get stronger, have more energy, help manage your weight and decrease  stress. Walking is an easy way to get exercise. Start out slowly and  increase the amount you walk as tolerated  · If you become short of breath, dizzy or have chest pain; stop, sit down, and rest.  · If you feel \"wiped out\" the day after you exercise, walk at a slower pace or for a shorter distance. You can gradually increase the pace or amount of time. (Do not exercise right after a meal or in extreme temperatures, such as above 85 degrees, if the air is really humid, or wind chill is less than 20 degrees)                                             ADDITIONAL INFORMATION:  · Avoid getting sick from colds and the flu. Stay away from friends or family that you know may have a contagious illness  · Get plenty of rest   · Get a flu shot each year.   · Get a pneumococcal vaccine shot. If you have had one before, ask your doctor whether you need another dose. My Goal for Self-management of Heart Failure Includes 5 steps :    1. Notice a change in symptoms ( weight gain, short of breath, leg swelling, decreased activity level, bloating. ...)    2. Evaluate the change: (use the Heart Failure Zones )     3. Decide to take action: decide what your options are, such as: (call your doctor for an extra visit, take a prescribed medication, such as your water pill if your doctor has given you directions to do so, [address])    4. Come up with a strategy:  (now you call the doctor for advice / appointment. This is where you take action!!! Do not wait, catch the symptom early and treat it before it worsens. 5. Evaluate the response: The next day, check your Heart Failure Zones: are you in the GREEN ZONE (safe zone)? Worsening symptoms of YELLOW ZONE? Or have you moved to the RED ZONE and need to call 911 or go to the Emergency Room for evaluation? Call your doctor's office to update them on your symptoms of heart failure.

## 2022-07-14 NOTE — CARE COORDINATION
Here for-Leg Swelling (Bilat legs and right arm edema 3 months, stated she has a large lump in breast for 1 year). Met with patient to discuss role and transition of care. She lives with her 6 yr grandson and has support of her 3 children. She has no PCP. She uses [facility] [address]. She has been independent  Prior to admission. No DME,HHC, [facility] or oxygen. Her discharge plan is to return home And family will transport. Cm/shyam to follow. Electronically signed by Amarjit Ly RN on 7/14/2022 at 11:38 AM

## 2022-07-14 NOTE — H&P
Hospitalist History & Physical      PCP: No primary care provider on file. Date of Admission: 7/13/2022    Date of Service: Pt seen/examined on 7/13/2022 and is admitted to Inpatient with expected LOS greater than two midnights due to medical therapy. Chief Complaint:  had concerns including Leg Swelling (Bilat legs and right arm edema 3 months, stated she has a large lump in breast for 1 year). History Of Present Illness:    Ms. Omkar Sanders, a 78y.o. year old female  who  has a past medical history of Anxiety, Arthritis, Chronic bronchitis ([address] Emphysema, and Walking pneumonia. Patient has been having progressive weakness, lower extremity swelling. No shortness of breath. No chest pain. No abdominal pain. Concerned from the progressive swelling. Also, she has a right breast mass that she noticed 2 years ago. She is scared of going to the doctor, so she did not get it checked out . Denies weight loss. No nausea or vomiting. No diarrhea/constipation. No fevers or chills. Resting comfortably. Asking when she can go home. Past Medical History:        Diagnosis Date    Anxiety     Arthritis     Chronic bronchitis (HCC)     Emphysema     according to xray report 2013    Walking pneumonia        Past Surgical History:        Procedure Laterality Date    APPENDECTOMY         Medications Prior to Admission:      Prior to Admission medications    Medication Sig Start Date End Date Taking? Authorizing Provider   ibuprofen (ADVIL;MOTRIN) 200 MG tablet Take 400 mg by mouth 2 times daily as needed for Pain   Yes Historical Provider, MD       Allergies:  Patient has no known allergies. Social History:    TOBACCO:   reports that she has been smoking. She has been smoking about 0.50 packs per day. She has never used smokeless tobacco.  ETOH:   reports no history of alcohol use. Family History:    Reviewed in detail and negative for DM, CAD, Cancer, CVA.  Positive as follows\"      Problem Relation Age of Onset    Cancer Mother     Lung Cancer Father        REVIEW OF SYSTEMS:   Pertinent positives as noted in the HPI. All other systems reviewed and negative. PHYSICAL EXAM:  BP (!) 146/80   Pulse 67   Temp 98 °F (36.7 °C)   Resp 18   Ht 5' 7\" (1.702 m)   Wt 115 lb (52.2 kg)   SpO2 98%   BMI 18.01 kg/m²   General appearance: No apparent distress, appears stated age and cooperative. HEENT: Normal cephalic, atraumatic without obvious deformity. Pupils equal, round, and reactive to light. Extra ocular muscles intact. Conjunctivae/corneas clear. Neck: Supple, with full range of motion. No jugular venous distention. Trachea midline. Respiratory:  CTA bilaterally   Cardiovascular: RRR. Signficant lower extremity edema 3+ pitting edema   Abdomen: Soft, non tender. Musculoskeletal: No clubbing, cyanosis, Brisk capillary refill. Skin: Normal skin color. No rashes or lesions. Neurologic:  Neurovascularly intact without any focal sensory/motor deficits. Cranial nerves: II-XII intact, grossly non-focal.  Psychiatric: Alert and oriented, thought content appropriate, normal insight    Reviewed EKG and CXR personally    CBC:   Recent Labs     07/12/22  1754   WBC 4.7   RBC 4.93   HGB 14.9   HCT 50.5*   .4*   RDW 15.3*        BMP:   Recent Labs     07/12/22  1754      K 3.8      CO2 30*   BUN 20   CREATININE 1.1*     LFT:  Recent Labs     07/12/22  1754   PROT 6.6   ALKPHOS 95   *   AST 24   BILITOT 1.1     CE:  No results for input(s): Duong Buckley in the last 72 hours. PT/INR: No results for input(s): INR, APTT in the last 72 hours. BNP: No results for input(s): BNP in the last 72 hours.   ESR: No results found for: SEDRATE  CRP: No results found for: CRP  D Dimer: No results found for: DDIMER   Folate and B12: No results found for: [id number], No results found for: FOLATE  Lactic Acid: No results found for: LACTA  Thyroid Studies: No results found for: Valve  Mild pulmonic regurgitation. Pericardial Effusion  No evidence of a hemodynamically significant pericardial effusion. Aorta  Aortic root dimension within normal limits. Conclusions   Summary  Ejection fraction is visually estimated at 50%. Dilated right ventricle and reduced right ventricular function (TAPSE 1.0  cm). Indeterminate diastolic function. Severely dilated right atrium. Mild mitral regurgitation. Mild aortic regurgitation. Moderate tricuspid regurgitation. RV-RA gradient is estimated at 58 mmHg.    Signature   ----------------------------------------------------------------  Electronically signed by Watson Carter MD(Interpreting  physician) on 07/13/2022 05:48 PM  ----------------------------------------------------------------  M-Mode/2D Measurements & Calculations   LV Diastolic       LV Systolic       AV Cusp Separation: 1.7 cmLA  Dimension: 4.2 cm  Dimension: 3 cm   Dimension: 3.7 cmAO Root Dimension: 3  LV FS:28.6 %                         cm  LV PW Diastolic:  0.8 cm             LV Mass Index: 63  Septum Diastolic:  l/min*m^2  0.8 cm                               RV Diastolic Dimension: 3.1 cm  CO: 3.55 l/min  CI: 2.22 l/m*m^2   LVOT: 1.9 cm      Ascending Aorta: 1.8 cm  LV Mass: 101.29 g                    LA volume/Index: 50.2 ml /31.35ml/m^2                                       RA Area: 19.7 cm^2  Doppler Measurements & Calculations   MV Peak E-Wave: 0.7  AV Peak Velocity: 1.39 LVOT Peak Velocity: 0.82 m/s  m/s                  m/s                    LVOT Mean Velocity: 0.49 m/s  MV Peak A-Wave: 0.55 AV Peak Gradient: 7.7  LVOT Peak Gradient: 2.7  m/s                  mmHg                   mmHgLVOT Mean Gradient: 1.1  MV E/A Ratio: 1.26   AV Mean Velocity: 0.83 mmHg  MV Peak Gradient:    m/s                    Estimated RVSP: 60.6 mmHg  4.2 mmHg             AV Mean Gradient: 3.3  Estimated RAP:3 mmHg  MV Mean Gradient:    mmHg  1.8 mmHg             AV VTI: 22.2 cm  MV Mean Velocity:    AV Area                TR Velocity:3.8 m/s  0.62 m/s             (Continuity):1.93 cm^2 TR Gradient:57.64 mmHg  MV Deceleration      AV Deceleration Time:  PV Peak Velocity: 0.75 m/s  Time: 112.8 msec     1460 msec              PV Peak Gradient: 2.24 mmHg  MV P1/2t: 40.8 msec  LVOT VTI: 15.1 cm      PV Mean Velocity: 0.55 m/s  MVA by PHT:5.39 cm^2                        PV Mean Gradient: 1.3 mmHg  MV Area              Estimated PASP: 60.64  (continuity): 2.4    mmHg                   RI ED Velocity: 1.65 m/s  cm^2  MV E' Septal  Velocity: 0.09 m/s  MV E' Lateral  Velocity: 10 m/s  [email or web address] [id number]    XR RADIUS ULNA RIGHT (2 VIEWS)    Result Date: 6/18/2022  EXAMINATION: TWO XRAY VIEWS OF THE RIGHT FOREARM 6/18/2022 11:03 pm COMPARISON: None. HISTORY: ORDERING SYSTEM PROVIDED HISTORY: arm pain and swelling TECHNOLOGIST PROVIDED HISTORY: Reason for exam:->arm pain and swelling FINDINGS: There is no evidence of acute fracture. There is normal alignment. No acute joint abnormality. No focal osseous lesion. No focal soft tissue abnormality. No acute osseous or soft tissue abnormality. XR FOOT RIGHT (MIN 3 VIEWS)    Result Date: 6/19/2022  EXAMINATION: THREE XRAY VIEWS OF THE RIGHT FOOT 6/18/2022 11:03 pm COMPARISON: None. HISTORY: ORDERING SYSTEM PROVIDED HISTORY: foot pain TECHNOLOGIST PROVIDED HISTORY: Reason for exam:->foot pain FINDINGS: There is no evidence of acute fracture. There is normal alignment of the tarsometatarsal joints. No acute joint abnormality. No focal osseous lesion. Dorsal soft tissue edema. No acute osseous abnormality. Dorsal soft tissue edema.      CTA PULMONARY W CONTRAST    Result Date: 7/13/2022  EXAMINATION: CTA OF THE CHEST 7/13/2022 11:52 am TECHNIQUE: CTA of the chest was performed after the administration of intravenous contrast. Multiplanar reformatted images are provided for review. MIP images are provided for review. Automated exposure control, iterative reconstruction, and/or weight based adjustment of the mA/kV was utilized to reduce the radiation dose to as low as reasonably achievable. COMPARISON: None. HISTORY: ORDERING SYSTEM PROVIDED HISTORY: Right breast mass, swelling of b/l lower extremities, swelling of R UE, elevated troponin & BNP, hx of smoking TECHNOLOGIST PROVIDED HISTORY: Reason for exam:->Right breast mass, swelling of b/l lower extremities, swelling of R UE, elevated troponin & BNP, hx of smoking Decision Support Exception - unselect if not a suspected or confirmed emergency medical condition->Emergency Medical Condition (MA) What reading provider will be dictating this exam?->CRC FINDINGS: Pulmonary Arteries: Pulmonary arteries are adequately opacified for evaluation. No evidence of intraluminal filling defect to suggest pulmonary embolism. Main pulmonary artery is normal in caliber. Mediastinum: No evidence of mediastinal lymphadenopathy. The heart is enlarged. There is significant right atrial enlargement. Lungs/pleura: There are emphysematous changes. There is no pulmonary infiltrate, mass or suspicious pulmonary nodule. Significant biapical pleuroparenchymal scarring is noted. There is pleuroparenchymal scarring and atelectasis seen within the left lower lobe. There is no right or left pleural effusion. There are no findings of paul pulmonary edema. Upper Abdomen: Limited images of the upper abdomen are unremarkable. Soft Tissues/Bones: There is a irregular soft tissue mass seen within the right breast which is irregular. The right breast mass measures approximately 6 cm x 4 cm by 5.4 cm. There is right lateral chest wall edema. There is no adjacent bony erosion. There is skin thickening of the right breast.     1. There is no pulmonary embolus.  2. Irregular soft tissue mass within the right breast measuring 6 cm x 4 cm x 5.4 cm. There is adjacent skin thickening as well as soft tissue edema within the right lateral chest wall. There are enlarged lymph nodes within the right axilla. This finding is highly suggestive of breast malignancy. 3. There is no appreciable adjacent metastatic disease to the right ribs. 4. Emphysematous changes. There is no pulmonary infiltrate 5. Pleuroparenchymal scarring and atelectasis seen within the left lung base. 6. Cardiomegaly with significant right atrial enlargement. CTA PULMONARY W CONTRAST    Result Date: 6/19/2022  EXAMINATION: CTA OF THE CHEST 6/18/2022 11:04 pm TECHNIQUE: CTA of the chest was performed after the administration of intravenous contrast.  Multiplanar reformatted images are provided for review. MIP images are provided for review. Automated exposure control, iterative reconstruction, and/or weight based adjustment of the mA/kV was utilized to reduce the radiation dose to as low as reasonably achievable. COMPARISON: None. HISTORY: ORDERING SYSTEM PROVIDED HISTORY: right breast mass, leg swelling/possible DVT, eval for PE TECHNOLOGIST PROVIDED HISTORY: Reason for exam:->right breast mass, leg swelling/possible DVT, eval for PE Decision Support Exception - unselect if not a suspected or confirmed emergency medical condition->Emergency Medical Condition (MA) FINDINGS: Pulmonary Arteries: Pulmonary arteries are adequately opacified for evaluation. No evidence of intraluminal filling defect to suggest pulmonary embolism. Main pulmonary artery is normal in caliber. Mediastinum: No evidence of mediastinal lymphadenopathy. Cardiomegaly. There is no acute abnormality of the thoracic aorta. Lungs/pleura: The lungs are without acute process. No focal consolidation or pulmonary edema. No evidence of pleural effusion or pneumothorax. Upper Abdomen: Limited images of the upper abdomen are unremarkable. Soft Tissues/Bones: No osseous abnormality.   Right breast recommendations    3. DVT prophylaxis  -Lovenox when done with interventions    4. Code Status  Full code but would not want long term resuscitation. Diet: ADULT DIET; Regular; Low Sodium (2 gm)  Code Status: Full Code    Surrogate decision maker confirmed with patient:  Primary Emergency Contact: Jimmy Kelly, Jamarcus Phone: [phone]    DVT Prophylaxis: [x]Lovenox []Heparin []PCD [] [address] []Encouraged ambulation    Disposition: [x]Med/Surg [] Intermediate [] ICU/CCU  Admit status: [] Observation [x] Inpatient     +++++++++++++++++++++++++++++++++++++++++++++++++  Renay Reza DO  [address]  +++++++++++++++++++++++++++++++++++++++++++++++++  NOTE: This report was transcribed using voice recognition software. Every effort was made to ensure accuracy; however, inadvertent computerized transcription errors may be present.

## 2022-07-14 NOTE — PROGRESS NOTES
INPATIENT CARDIOLOGY FOLLOW-UP    Name: Issac Neal    Age: 78 y.o. Date of Admission: 7/13/2022 10:33 AM    Date of Service: 7/14/2022    Chief Complaint: Follow-up for elevated troponin, acute right-sided CHF    Interim History:  Currently with no chest pain, respiratory distress, or palpitations. History of ++LE edema and RUE edema (\"much better per patient\" with ongoing diuresis). SR on EKG and telemetry. No new overnight cardiac complaints. Review of Systems:   Cardiac: As per HPI  General: No fever, chills  Pulmonary: As per HPI  HEENT: No visual disturbances, difficult swallowing  GI: No nausea, vomiting  : No dysuria, hematuria  Endocrine: No thyroid disease or DM  Musculoskeletal: RESENDIZ x 4, no focal motor deficits  Skin: Intact, no rashes  Neuro: No headache, seizures  Psych: Currently with no depression, anxiety    Problem List:  Patient Active Problem List   Diagnosis    Chronic obstructive pulmonary disease [facility]    Arthritis    Mild episode of recurrent major depressive disorder ([facility]    Primary hypertension    Elevated troponin    Acute right heart failure (HCC)    Severe pulmonary hypertension ([facility]    Tobacco abuse       Allergies:   Allergies   Allergen Reactions    Bee Venom Swelling       Current Medications:  Current Facility-Administered Medications   Medication Dose Route Frequency Provider Last Rate Last Admin    perflutren lipid microspheres (DEFINITY) injection 1.65 mg  1.5 mL IntraVENous ONCE PRN Leocadia Dial, DO        carvedilol (COREG) tablet 6.25 mg  6.25 mg Oral BID  ANGELITA Manuel - CNS   6.25 mg at 07/13/22 1807    furosemide (LASIX) injection 20 mg  20 mg IntraVENous BID Yuni San MD   20 mg at 07/13/22 1917    sodium chloride flush 0.9 % injection 5-40 mL  5-40 mL IntraVENous 2 times per day Leocadia Dial, DO   5 mL at 07/14/22 0000    sodium chloride flush 0.9 % injection 5-40 mL  5-40 mL IntraVENous PRN Leocadia Dial, DO  0.9 % sodium chloride infusion   IntraVENous PRN Kodak Niece, DO        ondansetron (ZOFRAN-ODT) disintegrating tablet 4 mg  4 mg Oral Q8H PRN Kodak Niece, DO        Or    ondansetron [facility]) injection 4 mg  4 mg IntraVENous Q6H PRN Kodak Niece, DO        polyethylene glycol (GLYCOLAX) packet 17 g  17 g Oral Daily PRN Kodak Niece, DO        acetaminophen (TYLENOL) tablet 650 mg  650 mg Oral Q6H PRN Kodak Niece, DO        Or    acetaminophen (TYLENOL) suppository 650 mg  650 mg Rectal Q6H PRN Kodak Niece, DO        [Held by provider] enoxaparin (LOVENOX) injection 40 mg  40 mg SubCUTAneous Daily Kodak Niece, DO          sodium chloride         Physical Exam:  BP (!) 131/53   Pulse 53   Temp (!) 96.2 °F (35.7 °C) (Axillary)   Resp 14   Ht 5' 7\" (1.702 m)   Wt 115 lb (52.2 kg)   SpO2 98%   BMI 18.01 kg/m²   Wt Readings from Last 3 Encounters:   07/14/22 115 lb (52.2 kg)   06/18/22 110 lb (49.9 kg)   04/12/18 121 lb (54.9 kg)     Appearance: Awake, alert, no acute respiratory distress  Skin: Intact, no rash  Head: Normocephalic, atraumatic  Eyes: EOMI, no conjunctival erythema  ENMT: No pharyngeal erythema, MMM, no rhinorrhea  Neck: Supple, no carotid bruits  Lungs: Decreased BS B/L, no wheezing  Cardiac: Regular rate and rhythm, +U9G2, 2/6 systolic murmur  Abdomen: Soft, nontender, +bowel sounds  Extremities: Moves all extremities x 4, ++lower extremity edema, RUE swelling  Neurologic: No focal motor deficits apparent, normal mood and affect    Intake/Output:    Intake/Output Summary (Last 24 hours) at 7/14/2022 0815  Last data filed at 7/14/2022 0005  Gross per 24 hour   Intake --   Output 1500 ml   Net -1500 ml     No intake/output data recorded.     Laboratory Tests:  Recent Labs     07/12/22  1754 07/14/22  0531    136   K 3.8 3.8    98   CO2 30* 29   BUN 20 12   CREATININE 1.1* 0.8   GLUCOSE 134* 72*   CALCIUM 8.6 7.8*     Lab Results Component Value Date/Time    MG 1.8 07/14/2022 05:31 AM     Recent Labs     07/12/22  1754   ALKPHOS 95   *   AST 24   PROT 6.6   BILITOT 1.1   LABALBU 3.5     Recent Labs     07/12/22 1754 07/14/22  0531   WBC 4.7 6.3   RBC 4.93 4.90   HGB 14.9 14.9   HCT 50.5* 49.9*   .4* 101.8*   MCH 30.2 30.4   MCHC 29.5* 29.9*   RDW 15.3* 15.0    148   MPV 9.7 9.3     No results found for: CKTOTAL, CKMB, CKMBINDEX, TROPONINI  Recent Labs     07/12/22  1754 07/13/22  0503 07/13/22  1534   TROPHS 101* 106* 125*     No results found for: INR, PROTIME  Lab Results   Component Value Date    TSH 2.510 07/14/2022     No results found for: LABA1C  No results found for: EAG  Lab Results   Component Value Date    CHOL 155 07/14/2022     Lab Results   Component Value Date    TRIG 89 07/14/2022     Lab Results   Component Value Date    HDL 48 07/14/2022     Lab Results   Component Value Date    LDLCALC 89 07/14/2022     Lab Results   Component Value Date    LABVLDL 18 07/14/2022     No results found for: [facility]  Recent Labs     07/12/22  1754   PROBNP 17,463*       Cardiac Tests:  EKG personally reviewed: SR, rate 96, NSSTT changes     Telemetry personally reviewed (date: 7/14/2022): SR, 70's     CTA 7/12/2022:  Impression:       1. There is no pulmonary embolus. 2. Irregular soft tissue mass within the right breast measuring 6 cm x 4 cm x   5.4 cm.  There is adjacent skin thickening as well as soft tissue edema   within the right lateral chest wall.  There are enlarged lymph nodes within   the right axilla.  This finding is highly suggestive of breast malignancy. 3. There is no appreciable adjacent metastatic disease to the right ribs. 4. Emphysematous changes. Bronson Brittany is no pulmonary infiltrate   5. Pleuroparenchymal scarring and atelectasis seen within the left lung base.    6. Cardiomegaly with significant right atrial enlargement.      Upper extremity ultrasound 7/12/2022:  Impression:       Within the visualized vessels there is no evidence for deep venous thrombosis      Lower extremity ultrasound 7/12/2022:  Impression:       Within the visualized vessels there is no evidence for deep venous thrombosis        Echocardiogram reviewed: 7/13/22 (Dr. Marialuisa Majano)   Ejection fraction is visually estimated at 50%.  Dilated right ventricle and reduced right ventricular function (TAPSE 1.0 cm).   Indeterminate diastolic function.   Severely dilated right atrium.   Mild mitral regurgitation.   Mild aortic regurgitation.   Moderate tricuspid regurgitation.  RV-RA gradient is estimated at 58 mmHg. ASSESSMENT / PLAN:  1. Elevated troponin (101 --> 106 --> 125):  · No symptoms of angina and no acute EKG changes. · Multiple risk factors for CAD  · Acute right-sided CHF     2. Acute right-sided CHF / severe PHTN     3. Right breast mass     4. Lower extremity and right upper extremity edema: no evidence of DVT on ultrasound     5. Hypertension -- uncontrolled on admission ('s-170's) --> improved, most recent 's     6. Mild renal insufficiency (Cr 1.1) -- improved, Cr 0.8 today     7. Severely dilated right atrium, mild mitral regurgitation, mild aortic regurgitation, moderate tricuspid regurgitation.     8.  Ongoing tobacco abuse (0.5-1 PPD since age 13) / COPD     - Results of 7/13/22 echocardiogram reviewed with the patient today  - IV diuresis added on 7/13/22 -- continue / monitor I&O's and BMP closely (Cr improved, reported I/O's net negative 1.5 L thus far, LE edema improving)  - Coreg 6.25 mg BID added on 7/13/22 (continue)  - Will reassess for ischemic work-up once clinically improved  - She may proceed with surgical work up of right breast mass from a cardiology standpoint (surgery note reviewed)  - Aggressive risk factor modifications / counseled re: tobacco cessation  - Monitor telemetry (SR)    Greater than 35 minutes was spent counseling the patient, reviewing the rationale for the above recommendations and reviewing the patient's current medication list, problem list and results of all previously ordered testing.     Macario Bosworth, MD  [address] Cardiology

## 2022-07-14 NOTE — PROGRESS NOTES
[address]          Date:2022                                                   Patient Name: Mike Palmer     MRN: [id number]     : 1955     Room: [address] [place] [address]       Evaluating OT: Surjit Evans OTD, OTR/L, [id number]      Referring Provider: Julius Levy MD    Specific Provider Orders/Date: OT eval and treat (22)    Diagnosis: Elevated troponin [R77.8]  Mass of right breast [N63.10]  Acute respiratory failure with hypoxia ([address] [J96.01]  Bilateral lower extremity edema [R60.0]  Edema of right upper extremity [R60.0]  Breast mass, right [N63.10]    Surgeries/Procedures: None this admission       Pt admitted with bilateral leg/ R arm swelling. Pertinent Medical History:       has a past medical history of Anxiety, Arthritis, Chronic bronchitis ([address] Emphysema, and Walking pneumonia.          Precautions:  Fall Risk, fear of falls, impulsivity, O2     Assessment of current deficits    [x] Functional mobility  [x]ADLs  [x] Strength               []Cognition    [x] Functional transfers   [x] IADLs         [x] Safety Awareness   [x]Endurance    [] Fine Coordination              [x] Balance      [] Vision/perception   []Sensation     []Gross Motor Coordination  [] ROM  [] Delirium                   [] Motor Control     OT PLAN OF CARE   OT POC based on physician orders, patient diagnosis and results of clinical assessment    Frequency/Duration 1-3 days/wk for 2 weeks PRN   Specific OT Treatment Interventions to include:   * Instruction/training on adapted ADL techniques and AE recommendations to increase functional independence within precautions       * Training on energy conservation strategies, correct breathing pattern and techniques to improve independence/tolerance for self-care routine  * Functional transfer/mobility training/DME recommendations for increased independence, safety, and fall prevention  * Patient/Family education to increase follow through with safety techniques and functional independence  * Recommendation of environmental modifications for increased safety with functional transfers/mobility and ADLs  * Cognitive retraining/development of therapeutic activities to improve problem solving, judgement, memory, and attention for increased safety/participation in ADL/IADL tasks  * Sensory re-education to improve body/limb awareness, maintain/improve skin integrity, and improve hand/UE motor function  * Visual-perceptual training to improve environmental scanning, visual attention/focus, and oculomotor skills for increased safety/independence with functional transfers/mobility and ADLs  * Therapeutic exercise to improve motor endurance, ROM, and functional strength for ADLs/functional transfers  * Therapeutic activities to facilitate/challenge dynamic balance, stand tolerance for increased safety and independence with ADLs  * Therapeutic activities to facilitate gross/fine motor skills for increased independence with ADLs  * Neuro-muscular re-education: facilitation of righting/equilibrium reactions, midline orientation, scapular stability/mobility, normalization of muscle tone, and facilitation of volitional active controled movement  * Positioning to improve skin integrity, interaction with environment and functional independence  * Manual techniques for edema management    Recommended Adaptive Equipment/DME: Shower chair, FWW TBD     Home Living: Pt lives with 7 y/o grandson in 2nd floor apartment with [address] and bed and bath on main floor. Bathroom setup: tub/shower, standard commode   Equipment owned: None    Prior Level of Function: Ind with ADLs , Ind with IADLs; Used no AD for functional mobility.   Driving: Yes  Occupation: None stated    Pain Level: 0/10  Cognition: A&O: 4/4; Follows 2 step directions; limited insight to deficits, impulsive at times.   Memory: G-   Sequencing: G   Problem solving: G-   Judgement/safety: G-    Vitals Assessed:  SpO2: 96% prior to admission  SpO2: 88% following activity      BP: NT     Functional Assessment:  AM-PAC Daily Activity Raw Score: 19/24   Initial Eval Status  Date: 7/14/22 Treatment Status  Date: STGs = LTGs  Time frame: 10-14 days   Feeding Independent     Independent    Grooming Min A   To stand at sink    [place]    UB Dressing Stand by Assist     Independent    LB Dressing Stand by Assist   To don/doff socks in sitting position. [place]    Bathing Minimal Assist    Supervision    Toileting Stand by Assist     [place]    Bed Mobility  Supine to sit: Supervision   Sit to supine: Supervision   Supine to sit: Independent   Sit to supine: Independent    Functional Transfers Minimal Assist for STS  1 LOB during descent  Pt educated on body mechanics and pacing activity with fair reception to education. [place]    Functional Mobility SBA with FWW  For short household distance    Minimal Assist with no AD  With 1 LOB. [place]    Balance Sitting:     Static: Ind    Dynamic:S  Standing: min A    during functional activity  Sitting:     Static: Ind    Dynamic:Ind  Standing: Mod I   Activity Tolerance Fair with light activity. WFL  For ADL/IADL participation   Visual/  Perceptual Glasses: Yes        Safety G-  G     Hand Dominance R   AROM (PROM) Strength Additional Info:    RUE  WFL Grossly 4/5 Good  and wfl FMC/dexterity noted during ADL tasks       LUE WFL Grossly 4/5 Good  and wfl FMC/dexterity noted during ADL tasks       Hearing: WFL   Sensation:  No c/o numbness or tingling   Tone: WFL   Edema: unremarkable    Comments: Patient cleared by nursing. Upon arrival patient supine in bed , educated on the role of OT, and agreeable to OT session. Son and son's girlfriend present for session today.  OT facilitated ADLs, bed mobility, functional transfers with focus on safety, body mechanics, and balance with/without AD. At end of session, patient supine in bed per request, properly positioned, oriented to call light, with call light to R side and phone within reach, all lines and tubes intact. Nursing notified. Overall patient demonstrated fair reception to education, decreased independence and safety during completion of ADL/functional transfer/mobility tasks with limited insight to deficits. Pt would benefit from continued skilled OT to increase safety and independence with completion of ADL/IADL tasks for functional independence and quality of life. Treatment: OT treatment provided this date includes:    Instruction/training on safety and adapted techniques for completion of ADLs: to increase independence in self-care.   Instruction/training on safe functional mobility/transfer techniques: with focus on safety, body mechanics, and balance with use of AD.  Instruction/training on energy conservation/work simplification for completion of ADLs: DME and techniques to increase independence with self-care ADLs and IADLs, work simplification to improve endurance.  Skilled Monitoring of Vitals: to include BP, spO2, and HR throughout session to maximize safety.  Sitting/Standing Balance/Tolerance: Pt standing for ~5 min to increase balance and activity tolerance during ADLs and facilitate proper posture and positioning.  Therapeutic activity: to challenge dynamic sitting/standing balance and endurance to promote safety during ADL tasks and functional transfers and mobility. 1 LOB noted during activity. Rehab Potential: Good for established goals     Patient / Family Goal: to return [facility]      Patient and/or family were instructed on functional diagnosis, prognosis/goals and OT plan of care. Demonstrated good understanding.      Eval Complexity: Low    Time In: 2:50p  Time Out: 3:20p  Total Treatment Time: 15 min    Min Units OT Eval Low 97165 x  1    OT Eval Medium 96247      OT Eval High 87264      OT Re-Eval [id number]       Therapeutic Ex 23435       Therapeutic Activities 03029 15  1    ADL/Self Care 02199       Orthotic Management 82970       Manual 47298     Neuro Re-Ed 69682       Non-Billable Time          Evaluation Time additionally includes thorough review of current medical information, gathering information on past medical history/social history and prior level of function, interpretation of standardized testing/informal observation of tasks, assessment of data and development of plan of care and goals.             Vivian Nieto, OTD,  OTR/L; [id number]

## 2022-07-14 NOTE — PROGRESS NOTES
Nutrition Education    · Educated on CHF dietary guidelines   · Learners: Patient and Family  · Readiness: Eager  · Method: Explanation and Handout  · Response: Verbalizes Understanding  · Contact name and number provided.     Chrissie Galvan RD  Contact Number: ext [phone]

## 2022-07-14 NOTE — PROGRESS NOTES
GENERAL SURGERY  DAILY PROGRESS NOTE  7/14/2022    CHIEF COMPLAINT:  Chief Complaint   Patient presents with    Leg Swelling     Bilat legs and right arm edema 3 months, stated she has a large lump in breast for 1 year       SUBJECTIVE:  No acute issues overnight. Denies pain this morning. OBJECTIVE:  BP (!) 131/53   Pulse 53   Temp (!) 96.2 °F (35.7 °C) (Axillary)   Resp 14   Ht 5' 7\" (1.702 m)   Wt 115 lb (52.2 kg)   SpO2 98%   BMI 18.01 kg/m²     GENERAL:  NAD. A&Ox3. LUNGS:  No increased work of breathing. CARDIOVASCULAR: RR  ABDOMEN:  Soft, non-distended, non-tender. No guarding, rigidity, rebound. BREAST: R breast mass approx 6cm x 4cm x 5.4cm, skin dimpling around mass. No erythema or p'eau d'orange noted. There is nipple inversion present. R axillary lymph nodes palpable on examination. No warmth to breast. There was no L axillary LN palpable or inguinal     ASSESSMENT/PLAN:  78 y.o. female with R breast mass, RUE edema, and LAD R axilla, concerning for breast malignancy with mildly elevated ALT, no neurologic symptoms.      Patient will require work-up for her breast mass including ultrasound, biopsies, CT Chest, Abdomen, Pelvis in order to determine pathology and for planning. Appreciate Cardiology recs for heart failure and elevated troponin's. She is able to get this workup as an outpatient if insurance is unable to cover during this admission    Patrisha Meigs, MD  Surgery Resident PGY-2  7/14/2022  9:25 AM      I saw and examined the patient. Agree with assessment plan  Currently patient is being worked up for her heart failure. Her right breast mass work-up can be completed as an outpatient. She will need to follow-up with Dr. Malachi Fernández at the [facility] breast care center soon as possible upon discharge.     Darlene York MD

## 2022-07-15 ENCOUNTER — HOSPITAL ENCOUNTER (INPATIENT)
Dept: GENERAL RADIOLOGY | Age: 67
Discharge: HOME | DRG: 264 | End: 2022-07-17
Payer: MEDICARE

## 2022-07-15 ENCOUNTER — APPOINTMENT (OUTPATIENT)
Dept: CT IMAGING | Age: 67
DRG: 264 | End: 2022-07-15
Payer: MEDICARE

## 2022-07-15 ENCOUNTER — APPOINTMENT (OUTPATIENT)
Dept: GENERAL RADIOLOGY | Age: 67
DRG: 264 | End: 2022-07-15
Payer: MEDICARE

## 2022-07-15 DIAGNOSIS — N63.10 BREAST MASS, RIGHT: ICD-10-CM

## 2022-07-15 DIAGNOSIS — R92.8 OTHER ABNORMAL AND INCONCLUSIVE FINDINGS ON DIAGNOSTIC IMAGING OF BREAST: ICD-10-CM

## 2022-07-15 DIAGNOSIS — N63.10 BREAST MASS, RIGHT: Primary | ICD-10-CM

## 2022-07-15 PROCEDURE — G0279 TOMOSYNTHESIS, MAMMO: HCPCS

## 2022-07-15 PROCEDURE — 2700000000 HC OXYGEN THERAPY PER DAY

## 2022-07-15 PROCEDURE — 99222 1ST HOSP IP/OBS MODERATE 55: CPT | Performed by: SURGERY

## 2022-07-15 PROCEDURE — 99233 SBSQ HOSP IP/OBS HIGH 50: CPT | Performed by: INTERNAL MEDICINE

## 2022-07-15 PROCEDURE — 88360 TUMOR IMMUNOHISTOCHEM/MANUAL: CPT

## 2022-07-15 PROCEDURE — 2060000000 HC ICU INTERMEDIATE R&B

## 2022-07-15 PROCEDURE — A4648 IMPLANTABLE TISSUE MARKER: HCPCS

## 2022-07-15 PROCEDURE — 0HBT3ZX EXCISION OF RIGHT BREAST, PERCUTANEOUS APPROACH, DIAGNOSTIC: ICD-10-PCS | Performed by: INTERNAL MEDICINE

## 2022-07-15 PROCEDURE — 6370000000 HC RX 637 (ALT 250 FOR IP): Performed by: INTERNAL MEDICINE

## 2022-07-15 PROCEDURE — 2500000003 HC RX 250 WO HCPCS

## 2022-07-15 PROCEDURE — 6360000002 HC RX W HCPCS: Performed by: INTERNAL MEDICINE

## 2022-07-15 PROCEDURE — 2580000003 HC RX 258: Performed by: RADIOLOGY

## 2022-07-15 PROCEDURE — 6360000004 HC RX CONTRAST MEDICATION: Performed by: RADIOLOGY

## 2022-07-15 PROCEDURE — 71260 CT THORAX DX C+: CPT

## 2022-07-15 PROCEDURE — 88342 IMHCHEM/IMCYTCHM 1ST ANTB: CPT

## 2022-07-15 PROCEDURE — 88341 IMHCHEM/IMCYTCHM EA ADD ANTB: CPT

## 2022-07-15 PROCEDURE — 74177 CT ABD & PELVIS W/CONTRAST: CPT

## 2022-07-15 PROCEDURE — 76642 ULTRASOUND BREAST LIMITED: CPT | Performed by: RADIOLOGY

## 2022-07-15 PROCEDURE — 76642 ULTRASOUND BREAST LIMITED: CPT

## 2022-07-15 PROCEDURE — 88305 TISSUE EXAM BY PATHOLOGIST: CPT

## 2022-07-15 PROCEDURE — 10035 PLMT SFT TISS LOCLZJ DEV 1ST: CPT

## 2022-07-15 PROCEDURE — 07B53ZX EXCISION OF RIGHT AXILLARY LYMPHATIC, PERCUTANEOUS APPROACH, DIAGNOSTIC: ICD-10-PCS | Performed by: INTERNAL MEDICINE

## 2022-07-15 PROCEDURE — 2580000003 HC RX 258: Performed by: INTERNAL MEDICINE

## 2022-07-15 RX ORDER — OXYCODONE HYDROCHLORIDE AND ACETAMINOPHEN 5; 325 MG/1; MG/1
1 TABLET ORAL EVERY 4 HOURS PRN
Status: DISCONTINUED | OUTPATIENT
Start: 2022-07-15 | End: 2022-07-16 | Stop reason: HOSPADM

## 2022-07-15 RX ORDER — SODIUM CHLORIDE 0.9 % (FLUSH) 0.9 %
10 SYRINGE (ML) INJECTION PRN
Status: DISCONTINUED | OUTPATIENT
Start: 2022-07-15 | End: 2022-07-16 | Stop reason: HOSPADM

## 2022-07-15 RX ADMIN — FUROSEMIDE 20 MG: 10 INJECTION, SOLUTION INTRAMUSCULAR; INTRAVENOUS at 17:07

## 2022-07-15 RX ADMIN — FUROSEMIDE 20 MG: 10 INJECTION, SOLUTION INTRAMUSCULAR; INTRAVENOUS at 08:40

## 2022-07-15 RX ADMIN — OXYCODONE AND ACETAMINOPHEN 1 TABLET: 5; 325 TABLET ORAL at 23:53

## 2022-07-15 RX ADMIN — CARVEDILOL 6.25 MG: 6.25 TABLET, FILM COATED ORAL at 17:07

## 2022-07-15 RX ADMIN — Medication 10 ML: at 08:40

## 2022-07-15 RX ADMIN — IOPAMIDOL 75 ML: 755 INJECTION, SOLUTION INTRAVENOUS at 18:55

## 2022-07-15 RX ADMIN — Medication 10 ML: at 18:59

## 2022-07-15 RX ADMIN — ACETAMINOPHEN 325MG 650 MG: 325 TABLET ORAL at 19:58

## 2022-07-15 RX ADMIN — IOHEXOL 50 ML: 240 INJECTION, SOLUTION INTRATHECAL; INTRAVASCULAR; INTRAVENOUS; ORAL at 17:07

## 2022-07-15 RX ADMIN — Medication 10 ML: at 20:00

## 2022-07-15 ASSESSMENT — ENCOUNTER SYMPTOMS
ALLERGIC/IMMUNOLOGIC NEGATIVE: 1
NAUSEA: 0
ABDOMINAL PAIN: 0
DIARRHEA: 1
ANAL BLEEDING: 0
ABDOMINAL DISTENTION: 0
VOMITING: 0
EYES NEGATIVE: 1
COUGH: 0
SHORTNESS OF BREATH: 1
BLOOD IN STOOL: 0
CONSTIPATION: 1
BACK PAIN: 1

## 2022-07-15 ASSESSMENT — PAIN SCALES - GENERAL
PAINLEVEL_OUTOF10: 9
PAINLEVEL_OUTOF10: 0
PAINLEVEL_OUTOF10: 5

## 2022-07-15 ASSESSMENT — PAIN DESCRIPTION - DESCRIPTORS
DESCRIPTORS: ACHING
DESCRIPTORS: ACHING

## 2022-07-15 ASSESSMENT — PAIN DESCRIPTION - LOCATION
LOCATION: CHEST;ARM
LOCATION: LEG

## 2022-07-15 ASSESSMENT — PAIN DESCRIPTION - ORIENTATION
ORIENTATION: RIGHT;LEFT
ORIENTATION: RIGHT

## 2022-07-15 NOTE — PROGRESS NOTES
Physical Therapy    Date: 7/15/2022       Patient Name: Martha Grimes  : 1955      MRN: [id number]    PT orders received and appreciated. Patient currently unavailable for physical therapy services as patient was off floor for biopsy. See 7/16 AM as able.      Thank you,  Florentino Kiser PT, DPT  [id number]

## 2022-07-15 NOTE — PROGRESS NOTES
Met with Amaya Ricci prior to her right breast and right axillary core needle biopsies. Instructed on procedure and aftercare instructions. She verbalizes understanding. I remained with her during the biopsies. She tolerated both sites well. Assisted to wheelchair for transport back to inpatient unit.  O2 on 2LPM. Electronically signed by Lucy Boudreaux RN, BSN on 7/15/2022 at 2:27 PM

## 2022-07-15 NOTE — PROGRESS NOTES
INPATIENT CARDIOLOGY FOLLOW-UP    Name: Sebastien Villareal    Age: 78 y.o. Date of Admission: 7/13/2022 10:33 AM    Date of Service: 7/15/2022    Chief Complaint: Follow-up for elevated troponin, acute right-sided CHF    Interim History:  Currently with no chest pain, respiratory distress, or palpitations. History of ++LE edema and RUE edema (\"much better per patient\" with ongoing diuresis). SR on EKG and telemetry. No new overnight cardiac complaints. Review of Systems:   Cardiac: As per HPI  General: No fever, chills  Pulmonary: As per HPI  HEENT: No visual disturbances, difficult swallowing  GI: No nausea, vomiting  : No dysuria, hematuria  Endocrine: No thyroid disease or DM  Musculoskeletal: RESENDIZ x 4, no focal motor deficits  Skin: Intact, no rashes  Neuro: No headache, seizures  Psych: Currently with no depression, anxiety    Problem List:  Patient Active Problem List   Diagnosis    Chronic obstructive pulmonary disease (HCC)    Arthritis    Mild episode of recurrent major depressive disorder (HCC)    Primary hypertension    Elevated troponin    Acute right heart failure (HCC)    Severe pulmonary hypertension (HCC)    Tobacco abuse    Breast mass, right       Allergies:   Allergies   Allergen Reactions    Bee Venom Swelling       Current Medications:  Current Facility-Administered Medications   Medication Dose Route Frequency Provider Last Rate Last Admin    perflutren lipid microspheres (DEFINITY) injection 1.65 mg  1.5 mL IntraVENous ONCE PRN Delroy Yusuf DO        carvedilol (COREG) tablet 6.25 mg  6.25 mg Oral BID  Nereida Vela MD   6.25 mg at 07/13/22 1807    furosemide (LASIX) injection 20 mg  20 mg IntraVENous BID Alexandra Vargas MD   20 mg at 07/15/22 0840    sodium chloride flush 0.9 % injection 5-40 mL  5-40 mL IntraVENous 2 times per day Delroy Yusuf DO   10 mL at 07/15/22 0840    sodium chloride flush 0.9 % injection 5-40 mL  5-40 mL IntraVENous PRN Delroy Yusuf DO 0.9 % sodium chloride infusion   IntraVENous PRN Ethel Grieves, DO        ondansetron (ZOFRAN-ODT) disintegrating tablet 4 mg  4 mg Oral Q8H PRN Virginia Grselwynves, DO        Or    ondansetron [facility]) injection 4 mg  4 mg IntraVENous Q6H PRN Virginia Grieves, DO        polyethylene glycol (GLYCOLAX) packet 17 g  17 g Oral Daily PRN Ethel Grieves, DO        acetaminophen (TYLENOL) tablet 650 mg  650 mg Oral Q6H PRN Ethel Grieves, DO   650 mg at 07/14/22 2054    Or    acetaminophen (TYLENOL) suppository 650 mg  650 mg Rectal Q6H PRN Ethel Grieves, DO        [Held by provider] enoxaparin (LOVENOX) injection 40 mg  40 mg SubCUTAneous Daily Ethel Grieves, DO          sodium chloride         Physical Exam:  BP (!) 145/89   Pulse 83   Temp 97.2 °F (36.2 °C) (Temporal)   Resp 16   Ht 5' 7\" (1.702 m)   Wt 122 lb 2 oz (55.4 kg)   SpO2 96%   BMI 19.13 kg/m²   Wt Readings from Last 3 Encounters:   07/15/22 122 lb 2 oz (55.4 kg)   06/18/22 110 lb (49.9 kg)   04/12/18 121 lb (54.9 kg)     Appearance: Awake, alert, no acute respiratory distress  Skin: Intact, no rash  Head: Normocephalic, atraumatic  Eyes: EOMI, no conjunctival erythema  ENMT: No pharyngeal erythema, MMM, no rhinorrhea  Neck: Supple, no carotid bruits  Lungs: Decreased BS B/L, no wheezing  Cardiac: Regular rate and rhythm, +[id number], 2/6 systolic murmur  Abdomen: Soft, nontender, +bowel sounds  Extremities: Moves all extremities x 4, ++lower extremity edema, RUE swelling  Neurologic: No focal motor deficits apparent, normal mood and affect    Intake/Output:    Intake/Output Summary (Last 24 hours) at 7/15/2022 1613  Last data filed at 7/15/2022 1500  Gross per 24 hour   Intake 1080 ml   Output 3750 ml   Net -2670 ml     I/O this shift:   In: 480 [P.O.:480]  Out: 2350 [Urine:2350]    Laboratory Tests:  Recent Labs     07/12/22  1754 07/14/22  0531    136   K 3.8 3.8    98   CO2 30* 29   BUN 20 12   CREATININE 1.1* 0.8 extremity ultrasound 7/12/2022:  Impression:       Within the visualized vessels there is no evidence for deep venous thrombosis      Lower extremity ultrasound 7/12/2022:  Impression:       Within the visualized vessels there is no evidence for deep venous thrombosis        Echocardiogram reviewed: 7/13/22 (Dr. Marialuisa Majano)   Ejection fraction is visually estimated at 50%. Dilated right ventricle and reduced right ventricular function (TAPSE 1.0 cm). Indeterminate diastolic function. Severely dilated right atrium. Mild mitral regurgitation. Mild aortic regurgitation. Moderate tricuspid regurgitation. RV-RA gradient is estimated at 58 mmHg. ASSESSMENT / PLAN:  1. Elevated troponin (101 --> 106 --> 125): No symptoms of angina and no acute EKG changes. Multiple risk factors for CAD  Acute right-sided CHF     2. Acute right-sided CHF / severe PHTN     3. Right breast mass     4. Lower extremity and right upper extremity edema: no evidence of DVT on ultrasound     5. Hypertension -- uncontrolled on admission ('s-170's) --> improved, most recent -120's     6. Mild renal insufficiency (Cr 1.1) -- improved, repeat Cr 0.8     7. Severely dilated right atrium, mild mitral regurgitation, mild aortic regurgitation, moderate tricuspid regurgitation.      8. Ongoing tobacco abuse (0.5-1 PPD since age 13) / COPD     - Results of 7/13/22 echocardiogram reviewed with the patient  - IV diuresis added on 7/13/22 -- continue / monitor I&O's and BMP closely (Cr improved, reported I/O's net negative 2.1 L thus far, LE edema improving)  - Coreg 6.25 mg BID added on 7/13/22 (continue)  - Will reassess for ischemic work-up once clinically improved  - She may proceed with surgical work up of right breast mass from a cardiology standpoint (surgery note reviewed)  - Aggressive risk factor modifications / counseled re: tobacco cessation  - Monitor telemetry (SR)    Greater than 35 minutes was spent counseling the patient, reviewing the rationale for the above recommendations and reviewing the patient's current medication list, problem list and results of all previously ordered testing.     Carmen Ornelas MD  [facility]) Cardiology

## 2022-07-15 NOTE — CARE COORDINATION
Here for-Leg Swelling (Bilat legs and right arm edema 3 months, stated she has a large lump in breast for 1 year). Had right breast and right axillary core needle biopsies today. Her discharge plan is to return home And family will transport. She has no PCP. Cm/shyam to follow. Rehan/shyam to follow. Electronically signed by Jp Mata RN on 7/15/2022 at 3:11 PM

## 2022-07-15 NOTE — PROGRESS NOTES
Hospitalist Progress Note      SYNOPSIS: Patient admitted on 2022 for evaluation of right upper extremity swelling, right breast mass and lower extremity swelling    SUBJECTIVE:    Patient seen and examined at bedside. Reports her right arm/leg swelling is better. Awaiting right breast mass/lymph node biopsy. Denies any cough, chest pain, abdominal pain, nausea or vomiting. Records reviewed. Stable overnight. No other overnight issues reported. Temp (24hrs), Av.9 °F (36.1 °C), Min:96.8 °F (36 °C), Max:97.1 °F (36.2 °C)    DIET: ADULT DIET; Regular; Low Sodium (2 gm)  CODE: Full Code    Intake/Output Summary (Last 24 hours) at 7/15/2022 1546  Last data filed at 7/15/2022 1146  Gross per 24 hour   Intake 840 ml   Output 3500 ml   Net -2660 ml         OBJECTIVE:    BP (!) 119/52   Pulse 57   Temp 97.1 °F (36.2 °C) (Temporal)   Resp 16   Ht 5' 7\" (1.702 m)   Wt 122 lb 2 oz (55.4 kg)   SpO2 96%   BMI 19.13 kg/m²     General appearance: No apparent distress, appears stated age and cooperative. Breast exam-not done  Respiratory: Bilateral air entry fair. No wheezing or crackles. Cardiovascular: Regular rate rhythm, normal S1-S2  Abdomen: Soft, nontender, nondistended  Musculoskeletal: Right upper extremity edema, bilateral lower extremity peripheral edema 2+  Neurologic: awake, alert and following commands     Assessment and plan    Patient, 71-year-old female with past medical history of known right breast mass, hypertension, COPD, anxiety/depression, was admitted on 2022 for evaluation of right upper extremity swelling/right breast mass and worsening lower extremity swelling. Patient was evaluated by general surgery and planning for right breast mass biopsy. CT chest suggestive of right breast mass along with enlarged right axillary lymph nodes highly suggestive of breast malignancy. Also seen by cardiology for acute CHF exacerbation.   Echo shows EF 50%/severe pulmonary hypertension    ASSESSMENT:    Right upper extremity edema likely secondary to lymphatic obstruction due to right breast mass, concern for malignancy  Acute right-sided CHF exacerbation/severe pulmonary hypertension  Elevated troponin-unlikely ACS, likely demand ischemia  Mild DIANN-resolved  Hypertension  Nicotine dependence     PLAN:    General surgery evaluation appreciated. Planning for right breast mass biopsy today along with lymph node biopsy  Continue IV diuresis  Cardiology eval appreciated  Strict I's and O's, daily weights  Echocardiogram noted as above  Continue Coreg  DVT prophylaxis-Lovenox subcu on hold    Disposition-likely discharge home in 1 to 2 days/general surgery/cardiology recommendations. Medications:  REVIEWED DAILY    Infusion Medications    sodium chloride       Scheduled Medications    carvedilol  6.25 mg Oral BID WC    furosemide  20 mg IntraVENous BID    sodium chloride flush  5-40 mL IntraVENous 2 times per day    [Held by provider] enoxaparin  40 mg SubCUTAneous Daily     PRN Meds: perflutren lipid microspheres, sodium chloride flush, sodium chloride, ondansetron **OR** ondansetron, polyethylene glycol, acetaminophen **OR** acetaminophen    Labs:     Recent Labs     07/12/22  1754 07/14/22  0531   WBC 4.7 6.3   HGB 14.9 14.9   HCT 50.5* 49.9*    148         Recent Labs     07/12/22  1754 07/14/22  0531    136   K 3.8 3.8    98   CO2 30* 29   BUN 20 12   CREATININE 1.1* 0.8   CALCIUM 8.6 7.8*         Recent Labs     07/12/22  1754   PROT 6.6   ALKPHOS 95   *   AST 24   BILITOT 1.1         No results for input(s): INR in the last 72 hours. No results for input(s): Allison Gey in the last 72 hours.     Chronic labs:    Lab Results   Component Value Date    CHOL 155 07/14/2022    TRIG 89 07/14/2022    HDL 48 07/14/2022    LDLCALC 89 07/14/2022    TSH 2.510 07/14/2022       Radiology: REVIEWED DAILY    +++++++++++++++++++++++++++++++++++++++++++++++++  Yosi Mujica MD  [address]  +++++++++++++++++++++++++++++++++++++++++++++++++  NOTE: This report was transcribed using voice recognition software. Every effort was made to ensure accuracy; however, inadvertent computerized transcription errors may be present.

## 2022-07-15 NOTE — PROGRESS NOTES
[facility]  HISTORY & PHYSICAL  ATTENDING NOTE    Patient's Name/Date of Birth: Omkar Sanders / 1955    Date: July 15, 2022     Chief Complaint   Patient presents with    Leg Swelling     Bilat legs and right arm edema 3 months, stated she has a large lump in breast for 1 year        Omkar Sanders presents for evaluation of a mammographic abnormality. PCP: No primary care provider on file. . Gynecologist: none. Referred by:  Dr. Guille Carter    The mammogram was performed at [facility] on 7/15/2022. The patient has noted a change in BSE since presentation. Patient denies nipple discharge. Patient denies a personal history of breast cancer. Breast cancer risk factors include infrequent SMEs, Confucianism descent, and age and gender. Ashkenazi Confucianism Ancestry: Yes--maternal grandmother    Had something in the same area 10y ago and was told to not drink caffeine and then the lesion went away. OBSTETRIC RELATED HISTORY:  Age of menarche was 13. Age of menopause was 44. Patient denies hormonal therapy. Patient is . Age of first live birth was 25. Patient did not breast feed. Is patient interested in fertility information about fertility preservation? No    CANCER SURVEILLANCE HISTORY:  Mammograms: Yes 10y ago  Breast MRI's: No   Breast Biopsies: No   Colonoscopy: No   GI Polyps: Not Applicable   EGD: No   Pelvic Exam: Yes 15y ago  Pap Smear: Yes   Dermatology: No   Lung screening: no  H/O DVT:  no  H/O Radiation:  no        Estimated body mass index is 19.13 kg/m² as calculated from the following:    Height as of this encounter: 5' 7\" (1.702 m). Weight as of this encounter: 122 lb 2 oz (55.4 kg). Bra Size: 32A    Because violence is so common, we ask all our patients: are you in a relationship or do you live with a person who threatens, hurts, or controls you:  no    Patient drinks significant caffeinated beverages. Patient does smoke cigarettes.  Patient does use recreational drugs.       Past Medical History:   Diagnosis Date    Anxiety     Arthritis     Chronic bronchitis ([facility] [address]     Emphysema     according to xray report 2013    Walking pneumonia        Past Surgical History:   Procedure Laterality Date    APPENDECTOMY      INDER US PERQ DEVICE SOFT TISSUE PLMT  FIRST LESION  7/15/2022    INDER US PERQ DEVICE SOFT TISSUE PLMT  FIRST LESION 7/15/2022 [facility]    US BREAST NEEDLE BIOPSY RIGHT Right 7/15/2022    US BREAST NEEDLE BIOPSY RIGHT 7/15/2022 [facility]       Current Facility-Administered Medications   Medication Dose Route Frequency Provider Last Rate Last Admin    perflutren lipid microspheres (DEFINITY) injection 1.65 mg  1.5 mL IntraVENous ONCE PRN Kezia Crows, DO        carvedilol (COREG) tablet 6.25 mg  6.25 mg Oral BID  Ale Skaggs MD   6.25 mg at 07/13/22 1807    furosemide (LASIX) injection 20 mg  20 mg IntraVENous BID Rangel Castellon MD   20 mg at 07/15/22 0840    sodium chloride flush 0.9 % injection 5-40 mL  5-40 mL IntraVENous 2 times per day Kezia Steele, DO   10 mL at 07/15/22 0840    sodium chloride flush 0.9 % injection 5-40 mL  5-40 mL IntraVENous PRN Kezia Steele, DO        0.9 % sodium chloride infusion   IntraVENous PRN Kezia Steele, DO        ondansetron (ZOFRAN-ODT) disintegrating tablet 4 mg  4 mg Oral Q8H PRN Kezia Steele, DO        Or    ondansetron (ZOFRAN) injection 4 mg  4 mg IntraVENous Q6H PRN Kezia Steele, DO        polyethylene glycol (GLYCOLAX) packet 17 g  17 g Oral Daily PRN Kezia Steele, DO        acetaminophen (TYLENOL) tablet 650 mg  650 mg Oral Q6H PRN Kezia Steele, DO   650 mg at 07/14/22 2054    Or    acetaminophen (TYLENOL) suppository 650 mg  650 mg Rectal Q6H PRN Kezia Steele, DO        [Held by provider] enoxaparin (LOVENOX) injection 40 mg  40 mg SubCUTAneous Daily Kezia Steele, DO           Family History   Problem Relation Age of Onset    Cancer Mother     Lung Cancer Father      Ashkenazi Presybeterian Ancestry: Yes    Allergies   Allergen Reactions    Bee Venom Swelling       Social History     Socioeconomic History    Marital status:      Spouse name: Not on file    Number of children: Not on file    Years of education: Not on file    Highest education level: Not on file   Occupational History    Not on file   Tobacco Use    Smoking status: Every Day     Packs/day: 0.50     Types: Cigarettes    Smokeless tobacco: Never   Vaping Use    Vaping Use: Never used   Substance and Sexual Activity    Alcohol use: No    Drug use: No    Sexual activity: Not on file   Other Topics Concern    Not on file   Social History Narrative    Not on file     Social Determinants of Health     Financial Resource Strain: Not on file   Food Insecurity: Not on file   Transportation Needs: Not on file   Physical Activity: Not on file   Stress: Not on file   Social Connections: Not on file   Intimate Partner Violence: Not on file   Housing Stability: Not on file       Occupation: retired; home health care    Review of Systems   Constitutional: Negative. Negative for activity change, appetite change and unexpected weight change. HENT: Negative. Eyes: Negative. Respiratory:  Positive for shortness of breath. Negative for cough. Cardiovascular:  Positive for leg swelling. Negative for chest pain. Gastrointestinal:  Positive for constipation and diarrhea. Negative for abdominal distention, abdominal pain, anal bleeding, blood in stool, nausea and vomiting. Endocrine: Negative. Genitourinary: Negative. Musculoskeletal:  Positive for back pain (chronic). Negative for arthralgias, gait problem, joint swelling and myalgias. Skin: Negative. Allergic/Immunologic: Negative. Neurological: Negative. Negative for dizziness, weakness and headaches. Hematological: Negative. Psychiatric/Behavioral: Negative.   Negative for confusion, decreased concentration and sleep disturbance. ECOG PS:   0  Covid vaccination? No  Flu Vaccination? No    BP (!) 119/52   Pulse 57   Temp 97.1 °F (36.2 °C) (Temporal)   Resp 16   Ht 5' 7\" (1.702 m)   Wt 122 lb 2 oz (55.4 kg)   SpO2 96%   BMI 19.13 kg/m²   Physical Exam  Constitutional:       Comments: cachectic   HENT:      Head: Normocephalic and atraumatic. Nose: Nose normal.      Mouth/Throat:      Mouth: Mucous membranes are moist.      Pharynx: Oropharynx is clear. Eyes:      Extraocular Movements: Extraocular movements intact. Pupils: Pupils are equal, round, and reactive to light. Cardiovascular:      Rate and Rhythm: Normal rate and regular rhythm. Pulses: Normal pulses. Heart sounds: Normal heart sounds. Pulmonary:      Effort: Pulmonary effort is normal.      Breath sounds: Normal breath sounds. Chest:   Breasts:     Right: Mass, skin change, tenderness and axillary adenopathy present. No swelling, bleeding, inverted nipple, nipple discharge or supraclavicular adenopathy. Left: No swelling, bleeding, inverted nipple, mass, nipple discharge, skin change, tenderness, axillary adenopathy or supraclavicular adenopathy. Abdominal:      General: There is no distension. Palpations: Abdomen is soft. Tenderness: There is no abdominal tenderness. Musculoskeletal:         General: No tenderness or signs of injury. Cervical back: Normal range of motion and neck supple. Lymphadenopathy:      Cervical: No cervical adenopathy. Right cervical: No superficial cervical adenopathy. Left cervical: No superficial cervical adenopathy. Upper Body:      Right upper body: Axillary adenopathy present. No supraclavicular adenopathy. Left upper body: No supraclavicular or axillary adenopathy. Skin:     General: Skin is warm and dry. Neurological:      General: No focal deficit present. Mental Status: She is alert and oriented to person, place, and time.    Psychiatric: Mood and Affect: Mood normal.         Behavior: Behavior normal.         Thought Content: Thought content normal.         Judgment: Judgment normal.       MAMMOGRAM:  EXAMINATION:   DIAGNOSTIC DIGITAL BILATERAL BREASTS MAMMOGRAM WITH TOMOSYNTHESIS;    TARGETED   ULTRASOUND OF THE RIGHT BREAST; TARGETED ULTRASOUND OF THE LEFT BREAST,   7/15/2022 10:35 am       TECHNIQUE:   Diagnostic mammography of the bilateral breasts was performed with   tomosynthesis. 2D standard and 3D tomosynthesis combination imaging   performed through both breasts. Computer aided detection was utilized in    the   interpretation of this exam.; Targeted ultrasound of the right breast was   performed.; Target ultrasound of the left breast was performed. Views: Right cc and bilateral MLO views with tomosynthesis       COMPARISON:   Multiple prior studies, the most recent dated July 13, 2022       HISTORY:   ORDERING SYSTEM PROVIDED HISTORY: Breast mass, right   TECHNOLOGIST PROVIDED HISTORY:   [address]   Patient in 0B   Reason for exam:->Irregular soft tissue mass within the right breast   measuring 6 cm x 4 cm x       FINDINGS:   Bilateral diagnostic mammogram: Exam is suboptimal due to lack of left CC   view. The right breast is smaller than the left. There is diffuse   subcutaneous edema and skin thickening on the right. There are bilateral   focal asymmetries. Bilateral diagnostic ultrasound: Targeted bilateral breast ultrasound was   performed utilizing high-resolution, real-time scanning. In the right    breast   12 o'clock 1 cm from the nipple, there is a 4.9 x 3.5 x 3.9 cm irregular,   hypoechoic mass with angular margins. In the right axilla, there are two   abnormal right axillary lymph nodes. There are scattered complicated    cysts   of varying size in the left breast.  No suspicious cystic or solid mass   identified on the left. Impression   1.   Irregular mass in the right breast 12 right axilla, there are two   abnormal right axillary lymph nodes. There are scattered complicated    cysts   of varying size in the left breast.  No suspicious cystic or solid mass   identified on the left. Impression   1. Irregular mass in the right breast 12 o'clock and right axillary   lymphadenopathy are suspicious. 2.  Benign findings with no mammographic or sonographic evidence of   malignancy in the left breast.       Recommendation:       Ultrasound-guided core biopsies of the right breast and right axilla are   advised. BIRADS:   BIRADS - CATEGORY 4       Suspicious Abnormality. Biopsy should be considered at this time. OVERALL ASSESSMENT - SUSPICIOUS       ASSESSMENT/PLAN:  Right breast cancer--awaiting pathology   --CBC, CMP completed  --CT C/A/P, bone scan  --oncological, surgical options pending pathology. --ok to discharge from surgery standpoint and follow-up as outpatient.      Kourtney Tate MD, MSc, FACS  7/15/2022  3:43 PM

## 2022-07-16 ENCOUNTER — APPOINTMENT (OUTPATIENT)
Dept: NUCLEAR MEDICINE | Age: 67
DRG: 264 | End: 2022-07-16
Payer: MEDICARE

## 2022-07-16 VITALS
RESPIRATION RATE: 16 BRPM | SYSTOLIC BLOOD PRESSURE: 117 MMHG | DIASTOLIC BLOOD PRESSURE: 65 MMHG | TEMPERATURE: 97 F | OXYGEN SATURATION: 100 % | HEIGHT: 67 IN | BODY MASS INDEX: 18.99 KG/M2 | WEIGHT: 121 LBS | HEART RATE: 53 BPM

## 2022-07-16 LAB
ANION GAP SERPL CALCULATED.3IONS-SCNC: 11 MMOL/L (ref 7–16)
BUN BLDV-MCNC: 10 MG/DL (ref 6–23)
CALCIUM SERPL-MCNC: 7.5 MG/DL (ref 8.6–10.2)
CHLORIDE BLD-SCNC: 91 MMOL/L (ref 98–107)
CO2: 38 MMOL/L (ref 22–29)
CREAT SERPL-MCNC: 0.9 MG/DL (ref 0.5–1)
GFR AFRICAN AMERICAN: >60
GFR NON-AFRICAN AMERICAN: >60 ML/MIN/1.73
GLUCOSE BLD-MCNC: 81 MG/DL (ref 74–99)
MAGNESIUM: 1.6 MG/DL (ref 1.6–2.6)
POTASSIUM SERPL-SCNC: 3.2 MMOL/L (ref 3.5–5)
PRO-BNP: 3545 PG/ML (ref 0–125)
SODIUM BLD-SCNC: 140 MMOL/L (ref 132–146)
TROPONIN, HIGH SENSITIVITY: 121 NG/L (ref 0–9)

## 2022-07-16 PROCEDURE — 36415 COLL VENOUS BLD VENIPUNCTURE: CPT

## 2022-07-16 PROCEDURE — 2580000003 HC RX 258: Performed by: INTERNAL MEDICINE

## 2022-07-16 PROCEDURE — 97161 PT EVAL LOW COMPLEX 20 MIN: CPT

## 2022-07-16 PROCEDURE — 78306 BONE IMAGING WHOLE BODY: CPT | Performed by: RADIOLOGY

## 2022-07-16 PROCEDURE — 99233 SBSQ HOSP IP/OBS HIGH 50: CPT | Performed by: INTERNAL MEDICINE

## 2022-07-16 PROCEDURE — 6370000000 HC RX 637 (ALT 250 FOR IP): Performed by: INTERNAL MEDICINE

## 2022-07-16 PROCEDURE — 83880 ASSAY OF NATRIURETIC PEPTIDE: CPT

## 2022-07-16 PROCEDURE — 80048 BASIC METABOLIC PNL TOTAL CA: CPT

## 2022-07-16 PROCEDURE — 2700000000 HC OXYGEN THERAPY PER DAY

## 2022-07-16 PROCEDURE — 97530 THERAPEUTIC ACTIVITIES: CPT

## 2022-07-16 PROCEDURE — 83735 ASSAY OF MAGNESIUM: CPT

## 2022-07-16 PROCEDURE — 6360000002 HC RX W HCPCS: Performed by: INTERNAL MEDICINE

## 2022-07-16 PROCEDURE — 78306 BONE IMAGING WHOLE BODY: CPT | Performed by: SURGERY

## 2022-07-16 PROCEDURE — A9503 TC99M MEDRONATE: HCPCS | Performed by: RADIOLOGY

## 2022-07-16 PROCEDURE — 3430000000 HC RX DIAGNOSTIC RADIOPHARMACEUTICAL: Performed by: RADIOLOGY

## 2022-07-16 PROCEDURE — 84484 ASSAY OF TROPONIN QUANT: CPT

## 2022-07-16 RX ORDER — POTASSIUM CHLORIDE 20 MEQ/1
40 TABLET, EXTENDED RELEASE ORAL DAILY
Qty: 60 TABLET | Refills: 1 | Status: SHIPPED | OUTPATIENT
Start: 2022-07-16 | End: 2022-08-04

## 2022-07-16 RX ORDER — POTASSIUM CHLORIDE 20 MEQ/1
40 TABLET, EXTENDED RELEASE ORAL
Status: COMPLETED | OUTPATIENT
Start: 2022-07-16 | End: 2022-07-16

## 2022-07-16 RX ORDER — FUROSEMIDE 20 MG/1
40 TABLET ORAL DAILY
Status: DISCONTINUED | OUTPATIENT
Start: 2022-07-17 | End: 2022-07-16 | Stop reason: HOSPADM

## 2022-07-16 RX ORDER — CARVEDILOL 6.25 MG/1
6.25 TABLET ORAL 2 TIMES DAILY WITH MEALS
Qty: 60 TABLET | Refills: 3 | Status: SHIPPED | OUTPATIENT
Start: 2022-07-16 | End: 2022-08-04

## 2022-07-16 RX ORDER — TC 99M MEDRONATE 20 MG/10ML
20 INJECTION, POWDER, LYOPHILIZED, FOR SOLUTION INTRAVENOUS
Status: COMPLETED | OUTPATIENT
Start: 2022-07-16 | End: 2022-07-16

## 2022-07-16 RX ORDER — MAGNESIUM SULFATE IN WATER 40 MG/ML
2000 INJECTION, SOLUTION INTRAVENOUS ONCE
Status: COMPLETED | OUTPATIENT
Start: 2022-07-16 | End: 2022-07-16

## 2022-07-16 RX ORDER — FUROSEMIDE 40 MG/1
40 TABLET ORAL DAILY
Qty: 30 TABLET | Refills: 1 | Status: SHIPPED | OUTPATIENT
Start: 2022-07-17 | End: 2022-08-04

## 2022-07-16 RX ORDER — POTASSIUM CHLORIDE 20 MEQ/1
40 TABLET, EXTENDED RELEASE ORAL
Status: DISCONTINUED | OUTPATIENT
Start: 2022-07-17 | End: 2022-07-16 | Stop reason: HOSPADM

## 2022-07-16 RX ORDER — POTASSIUM CHLORIDE 20 MEQ/1
40 TABLET, EXTENDED RELEASE ORAL ONCE
Status: DISCONTINUED | OUTPATIENT
Start: 2022-07-16 | End: 2022-07-16 | Stop reason: SDUPTHER

## 2022-07-16 RX ORDER — FUROSEMIDE 20 MG/1
20 TABLET ORAL ONCE
Status: COMPLETED | OUTPATIENT
Start: 2022-07-16 | End: 2022-07-16

## 2022-07-16 RX ADMIN — FUROSEMIDE 20 MG: 20 TABLET ORAL at 13:58

## 2022-07-16 RX ADMIN — FUROSEMIDE 20 MG: 10 INJECTION, SOLUTION INTRAMUSCULAR; INTRAVENOUS at 08:14

## 2022-07-16 RX ADMIN — CARVEDILOL 6.25 MG: 6.25 TABLET, FILM COATED ORAL at 08:13

## 2022-07-16 RX ADMIN — MAGNESIUM SULFATE HEPTAHYDRATE 2000 MG: 40 INJECTION, SOLUTION INTRAVENOUS at 08:27

## 2022-07-16 RX ADMIN — Medication 10 ML: at 08:33

## 2022-07-16 RX ADMIN — POTASSIUM CHLORIDE 40 MEQ: 1500 TABLET, EXTENDED RELEASE ORAL at 11:21

## 2022-07-16 RX ADMIN — OXYCODONE AND ACETAMINOPHEN 1 TABLET: 5; 325 TABLET ORAL at 08:25

## 2022-07-16 RX ADMIN — TC 99M MEDRONATE 20 MILLICURIE: 20 INJECTION, POWDER, LYOPHILIZED, FOR SOLUTION INTRAVENOUS at 09:37

## 2022-07-16 RX ADMIN — POTASSIUM CHLORIDE 40 MEQ: 1500 TABLET, EXTENDED RELEASE ORAL at 08:13

## 2022-07-16 ASSESSMENT — PAIN DESCRIPTION - ORIENTATION
ORIENTATION: RIGHT

## 2022-07-16 ASSESSMENT — PAIN DESCRIPTION - LOCATION
LOCATION: CHEST

## 2022-07-16 ASSESSMENT — PAIN SCALES - GENERAL
PAINLEVEL_OUTOF10: 2
PAINLEVEL_OUTOF10: 7
PAINLEVEL_OUTOF10: 7

## 2022-07-16 ASSESSMENT — PAIN DESCRIPTION - DESCRIPTORS
DESCRIPTORS: ACHING
DESCRIPTORS: ACHING

## 2022-07-16 NOTE — PROGRESS NOTES
INPATIENT CARDIOLOGY FOLLOW-UP    Name: Dillon Hightower    Age: 78 y.o. Date of Admission: 7/13/2022 10:33 AM    Date of Service: 7/16/2022    Primary Cardiologist: Dr Myra Gibson    Chief Complaint: Follow-up for elevated troponin, CHF    Interim History:  Had breast biopsy yesterday. Net -5.5 L. Feeling well. Ambulating without symptoms. Sore at the site of the biopsy. Still with edema but significantly improved. Denies chest pain or dyspnea.     Review of Systems:   Negative except as described above    Problem List:  Patient Active Problem List   Diagnosis    Chronic obstructive pulmonary disease ([facility] [address]    Arthritis    [facility]    Primary hypertension    Elevated troponin    Acute right heart failure (HCC)    Severe pulmonary hypertension (HCC)    Tobacco abuse    Breast mass, right       Current Medications:    Current Facility-Administered Medications:     sodium chloride flush 0.9 % injection 10 mL, 10 mL, IntraVENous, PRN, Aime Ribeiro, DO, 10 mL at 07/25/59 1859    oxyCODONE-acetaminophen (PERCOCET) 5-325 MG per tablet 1 tablet, 1 tablet, Oral, Q4H PRN, Britany Hernandez MD, 1 tablet at 07/15/22 8613    perflutren lipid microspheres (DEFINITY) injection 1.65 mg, 1.5 mL, IntraVENous, ONCE PRN, Rosalita January, DO    carvedilol (COREG) tablet 6.25 mg, 6.25 mg, Oral, BID WC, Michelle Cabrera MD, 6.25 mg at 07/15/22 1707    furosemide (LASIX) injection 20 mg, 20 mg, IntraVENous, BID, Cherry Chan MD, 20 mg at 07/15/22 1707    sodium chloride flush 0.9 % injection 5-40 mL, 5-40 mL, IntraVENous, 2 times per day, Rosalita January, DO, 10 mL at 07/15/22 2000    sodium chloride flush 0.9 % injection 5-40 mL, 5-40 mL, IntraVENous, PRN, Rosalita Lutsen, DO    0.9 % sodium chloride infusion, , IntraVENous, PRN, Rosalita Lutsen, DO    ondansetron (ZOFRAN-ODT) disintegrating tablet 4 mg, 4 mg, Oral, Q8H PRN **OR** ondansetron (ZOFRAN) injection 4 mg, 4 mg, IntraVENous, Q6H PRN, Virginia Johnson DO    polyethylene glycol (GLYCOLAX) packet 17 g, 17 g, Oral, Daily PRN, Virginia Johnson DO    acetaminophen (TYLENOL) tablet 650 mg, 650 mg, Oral, Q6H PRN, 650 mg at 07/15/22 1958 **OR** acetaminophen (TYLENOL) suppository 650 mg, 650 mg, Rectal, Q6H PRN, Virginia Johnson DO    [Held by provider] enoxaparin (LOVENOX) injection 40 mg, 40 mg, SubCUTAneous, Daily, Virginia Johnson DO    Physical Exam:  /60   Pulse 55   Temp (!) 96.6 °F (35.9 °C) (Temporal)   Resp 18   Ht 5' 7\" (1.702 m)   Wt 121 lb (54.9 kg)   SpO2 96%   BMI 18.95 kg/m²   Wt Readings from Last 3 Encounters:   07/16/22 121 lb (54.9 kg)   06/18/22 110 lb (49.9 kg)   04/12/18 121 lb (54.9 kg)     Appearance: Awake, alert, no acute respiratory distress  Skin: Intact, no rash  Head: Normocephalic, atraumatic  Eyes: EOMI, no conjunctival erythema  ENMT: No pharyngeal erythema, MMM, no rhinorrhea  Neck: Supple, no elevated JVP, no carotid bruits  Lungs: Scattered basilar rales  Cardiac: PMI nondisplaced, Regular rhythm with a normal rate, S1 & S2 normal, soft systolic murmur left sternal border  Abdomen: Soft, nontender, +bowel sounds  Extremities: Moves all extremities x 4, skinny legs, 2+ pitting bilateral lower extremity edema  Neurologic: No focal motor deficits apparent, normal mood and affect  Peripheral Pulses: Intact posterior tibial pulses bilaterally    Intake/Output:    Intake/Output Summary (Last 24 hours) at 7/16/2022 0718  Last data filed at 7/16/2022 0624  Gross per 24 hour   Intake 720 ml   Output 4100 ml   Net -3380 ml     No intake/output data recorded.     Laboratory Tests:  Recent Labs     07/14/22  0531 07/16/22  0529    140   K 3.8 3.2*   CL 98 91*   CO2 29 38*   BUN 12 10   CREATININE 0.8 0.9   GLUCOSE 72* 81   CALCIUM 7.8* 7.5*     Lab Results   Component Value Date/Time    MG 1.6 07/16/2022 05:29 AM     No results for input(s): ALKPHOS, ALT, AST, PROT, BILITOT, BILIDIR, LABALBU in the last 72 hours. Recent Labs     22  0531   WBC 6.3   RBC 4.90   HGB 14.9   HCT 49.9*   .8*   MCH 30.4   MCHC 29.9*   RDW 15.0      MPV 9.3     No results found for: CKTOTAL, CKMB, CKMBINDEX, TROPONINI  No results found for: INR, PROTIME  Lab Results   Component Value Date    TSH 2.510 2022     No results found for: LABA1C  No results found for: EAG  Lab Results   Component Value Date    CHOL 155 2022     Lab Results   Component Value Date    TRIG 89 2022     Lab Results   Component Value Date    HDL 48 2022     Lab Results   Component Value Date    LDLCALC 89 2022     Lab Results   Component Value Date    LABVLDL 18 2022     No results found for: CHOLHDLRATIO  Recent Labs     22  0529   PROBNP 3,545*       Cardiac Tests:    EK2022: Sinus rhythm 96 bpm.  Normal axis. Incomplete right bundle branch block. Right atrial argument. Nonspecific T wave flattening    Telemetry:   Sinus bradycardia 50s; brief wide-complex tachycardia 21 beats somewhat irregular nonsustained    CTA chest 22  Impression   1. There is no pulmonary embolus. 2. Irregular soft tissue mass within the right breast measuring 6 cm x 4 cm x   5.4 cm. There is adjacent skin thickening as well as soft tissue edema   within the right lateral chest wall. There are enlarged lymph nodes within   the right axilla. This finding is highly suggestive of breast malignancy. 3. There is no appreciable adjacent metastatic disease to the right ribs. 4. Emphysematous changes. There is no pulmonary infiltrate   5. Pleuroparenchymal scarring and atelectasis seen within the left lung base. 6. Cardiomegaly with significant right atrial enlargement. Echocardiogram:   TTE 22   Summary   Ejection fraction is visually estimated at 50%. Dilated right ventricle and reduced right ventricular function (TAPSE 1.0   cm). Indeterminate diastolic function.

## 2022-07-16 NOTE — DISCHARGE SUMMARY
Hospitalist Discharge Summary    Patient ID: Mehnaz Robledo   Patient : 1955  Patient's PCP: No primary care provider on file. Admit Date: 2022   Admitting Physician: Maria Teresa Mujica MD    Discharge Date:  2022  Discharge Physician: Maria Teresa Mujica MD   Discharge Condition: Stable  Discharge Disposition: [facility] course in brief:  (Please refer to daily progress notes for a comprehensive review of the hospitalization by requesting medical records)    Patient, 55-year-old female with past medical history of known right breast mass, hypertension, COPD, anxiety/depression, was admitted on 2022 for evaluation of right upper extremity swelling/right breast mass and worsening lower extremity swelling. Patient was evaluated by general surgery and planning for right breast mass biopsy. CT chest suggestive of right breast mass along with enlarged right axillary lymph nodes highly suggestive of breast malignancy. Also seen by cardiology for acute CHF exacerbation. Echo shows EF 50%/severe pulmonary hypertension. Patient started on p.o. diuretics. Biopsy done on 7/15/2022. Patient to follow-up with general surgery/cardiology as outpatient. Patient is being discharged in stable condition.     Consults:   IP CONSULT TO INTERNAL MEDICINE  IP CONSULT TO CARDIOLOGY  IP CONSULT TO GENERAL SURGERY  IP CONSULT TO HEART FAILURE NURSE/COORDINATOR  IP CONSULT TO DIETITIAN    Discharge Diagnoses:  Right upper extremity edema likely secondary to lymphatic obstruction due to right breast mass, concern for malignancy  Acute right-sided CHF exacerbation/severe pulmonary hypertension  Elevated troponin-unlikely ACS, likely demand ischemia  Mild DIANN-resolved  Hypertension  Nicotine dependence    Discharge Instructions / Follow up:    Continued appropriate risk factor modification of blood pressure, diabetes and serum lipids will remain essential to reducing risk of future atherosclerotic development    Activity: activity as tolerated    Physical exam    General appearance: No apparent distress, appears stated age and cooperative. Breast exam-not done  Respiratory: Bilateral air entry fair. No wheezing or crackles. Cardiovascular: Regular rate rhythm, normal S1-S2  Abdomen: Soft, nontender, nondistended  Musculoskeletal: Right upper extremity edema, bilateral lower extremity peripheral edema 2+  Neurologic: awake, alert and following commands    Significant labs:  CBC:   Recent Labs     07/14/22  0531   WBC 6.3   RBC 4.90   HGB 14.9   HCT 49.9*   .8*   RDW 15.0        BMP:   Recent Labs     07/14/22  0531 07/16/22  0529    140   K 3.8 3.2*   CL 98 91*   CO2 29 38*   BUN 12 10   CREATININE 0.8 0.9   MG 1.8 1.6     LFT:  No results for input(s): PROT, ALB, ALKPHOS, ALT, AST, BILITOT, AMYLASE, LIPASE in the last 72 hours. PT/INR: No results for input(s): INR, APTT in the last 72 hours. BNP: No results for input(s): BNP in the last 72 hours.   Hgb A1C: No results found for: LABA1C  Folate and B12: No results found for: WRXGGHTR80, No results found for: FOLATE  Thyroid Studies:   Lab Results   Component Value Date    TSH 2.510 07/14/2022       Urinalysis:  No results found for: NITRU, WBCUA, BACTERIA, RBCUA, BLOODU, SPECGRAV, GLUCOSEU    Imaging:  Echo Complete    Result Date: 7/13/2022  Transthoracic Echocardiography Report (TTE)  Demographics   Patient Name        Ariela Crabtree Gender                Female   Medical Record      [id number]     Room Number           21  Number   Account #           [de-identified]    Procedure Date        07/13/2022   Corporate ID                     Ordering Physician    Melchor Soriano MD   Accession Number    [id number]   Referring Physician   Date of Birth       1955   Sonographer           Jhon Mora   Age                 78 year(s)   Interpreting          Melchor Soriano MD                                   Physician Any Other  Procedure Type of Study   TTE procedure:Echo Complete W/Doppler & Color Flow. Procedure Date Date: 07/13/2022 Start: 03:50 PM Study Location: Portable Technical Quality: Adequate visualization Indications:Preop cardiac evaluation. Patient Status: Routine Height: 67 inches Weight: 115 pounds BSA: 1.6 m^2 BMI: 18.01 kg/m^2 HR: 83 bpm BP: 172/93 mmHg  Findings   Left Ventricle  Left ventricle size is normal.  Normal left ventricular wall thickness. Ejection fraction is visually estimated at 50%. Indeterminate diastolic function. Right Ventricle  Dilated right ventricle and reduced right ventricular function (TAPSE 1.0  cm). Left Atrium  Normal sized left atrium by volume index. Right Atrium  Severely dilated right atrium. Mitral Valve  Mild mitral regurgitation. No evidence of hemodynamically significant mitral stenosis. Tricuspid Valve  Moderate tricuspid regurgitation. RV-RA gradient is estimated at 58 mmHg. Aortic Valve  No evidence of hemodynamically significant aortic stenosis. Mild aortic regurgitation. Pulmonic Valve  Mild pulmonic regurgitation. Pericardial Effusion  No evidence of a hemodynamically significant pericardial effusion. Aorta  Aortic root dimension within normal limits. Conclusions   Summary  Ejection fraction is visually estimated at 50%. Dilated right ventricle and reduced right ventricular function (TAPSE 1.0  cm). Indeterminate diastolic function. Severely dilated right atrium. Mild mitral regurgitation. Mild aortic regurgitation. Moderate tricuspid regurgitation. RV-RA gradient is estimated at 58 mmHg.    Signature   ----------------------------------------------------------------  Electronically signed by Yakelin Ford MD(Interpreting  physician) on 07/13/2022 05:48 PM  ----------------------------------------------------------------  M-Mode/2D Measurements & Calculations   LV Diastolic       LV Systolic       AV Cusp Separation: 1.7 cmLA Dimension: 4.2 cm  Dimension: 3 cm   Dimension: 3.7 cmAO Root Dimension: 3  LV FS:28.6 %                         cm  LV PW Diastolic:  0.8 cm             LV Mass Index: 63  Septum Diastolic:  l/min*m^2  0.8 cm                               RV Diastolic Dimension: 3.1 cm  CO: 3.55 l/min  CI: 2.22 l/m*m^2   LVOT: 1.9 cm      Ascending Aorta: 1.8 cm  LV Mass: 101.29 g                    LA volume/Index: 50.2 ml /31.35ml/m^2                                       RA Area: 19.7 cm^2  Doppler Measurements & Calculations   MV Peak E-Wave: 0.7  AV Peak Velocity: 1.39 LVOT Peak Velocity: 0.82 m/s  m/s                  m/s                    LVOT Mean Velocity: 0.49 m/s  MV Peak A-Wave: 0.55 AV Peak Gradient: 7.7  LVOT Peak Gradient: 2.7  m/s                  mmHg                   mmHgLVOT Mean Gradient: 1.1  MV E/A Ratio: 1.26   AV Mean Velocity: 0.83 mmHg  MV Peak Gradient:    m/s                    Estimated RVSP: 60.6 mmHg  4.2 mmHg             AV Mean Gradient: 3.3  Estimated RAP:3 mmHg  MV Mean Gradient:    mmHg  1.8 mmHg             AV VTI: 22.2 cm  MV Mean Velocity:    AV Area                TR Velocity:3.8 m/s  0.62 m/s             (Continuity):1.93 cm^2 TR Gradient:57.64 mmHg  MV Deceleration      AV Deceleration Time:  PV Peak Velocity: 0.75 m/s  Time: 112.8 msec     1460 msec              PV Peak Gradient: 2.24 mmHg  MV P1/2t: 40.8 msec  LVOT VTI: 15.1 cm      PV Mean Velocity: 0.55 m/s  MVA by PHT:5.39 cm^2                        PV Mean Gradient: 1.3 mmHg  MV Area              Estimated PASP: 60.64  (continuity): 2.4    mmHg                   [place] ED Velocity: 1.65 m/s  cm^2  MV E' Septal  Velocity: 0.09 m/s  MV E' Lateral  Velocity: 10 m/s  [email or web address] [id number]    XR RADIUS ULNA RIGHT (2 VIEWS)    Result Date: 6/18/2022  EXAMINATION: TWO XRAY VIEWS OF THE RIGHT FOREARM 6/18/2022 11:03 pm COMPARISON: None. HISTORY: ORDERING SYSTEM PROVIDED HISTORY: arm pain and swelling TECHNOLOGIST PROVIDED HISTORY: Reason for exam:->arm pain and swelling FINDINGS: There is no evidence of acute fracture. There is normal alignment. No acute joint abnormality. No focal osseous lesion. No focal soft tissue abnormality. No acute osseous or soft tissue abnormality. XR FOOT RIGHT (MIN 3 VIEWS)    Result Date: 6/19/2022  EXAMINATION: THREE XRAY VIEWS OF THE RIGHT FOOT 6/18/2022 11:03 pm COMPARISON: None. HISTORY: ORDERING SYSTEM PROVIDED HISTORY: foot pain TECHNOLOGIST PROVIDED HISTORY: Reason for exam:->foot pain FINDINGS: There is no evidence of acute fracture. There is normal alignment of the tarsometatarsal joints. No acute joint abnormality. No focal osseous lesion. Dorsal soft tissue edema. No acute osseous abnormality. Dorsal soft tissue edema. CT CHEST W CONTRAST    Result Date: 7/15/2022  EXAMINATION: CT OF THE ABDOMEN AND PELVIS WITH CONTRAST; CT OF THE CHEST WITH CONTRAST 7/15/2022 3:43 pm TECHNIQUE: CT of the abdomen and pelvis was performed with the administration of intravenous contrast. Multiplanar reformatted images are provided for review. Automated exposure control, iterative reconstruction, and/or weight based adjustment of the mA/kV was utilized to reduce the radiation dose to as low as reasonably achievable.; CT of the chest was performed with the administration of intravenous contrast. Multiplanar reformatted images are provided for review. Automated exposure control, iterative reconstruction, and/or weight based adjustment of the mA/kV was utilized to reduce the radiation dose to as low as reasonably achievable.  COMPARISON: CTA chest 07/13/2022 HISTORY: ORDERING SYSTEM PROVIDED HISTORY: breast cancer staging TECHNOLOGIST PROVIDED HISTORY: Additional Contrast?->Oral Reason for exam:->breast cancer staging What reading provider will be dictating this exam?->CRC FINDINGS: CHEST: Oncologic: Right breast mass again noted. Right axillary lymphadenopathy again noted. No pathologically enlarged the mediastinal or hilar lymph nodes. A 9 mm right lower paratracheal (series 2, image 71) lymph node is prominent but nonspecific. No pulmonary mass or suspicious pulmonary nodules. No lytic or blastic osseous metastases are seen. Non oncologic: Unremarkable thyroid. Atherosclerotic disease including coronary disease. No thoracic aortic aneurysm or dissection. No pulmonary embolism. Cardiomegaly. No pericardial effusion. Unremarkable esophagus. Trace right pleural effusion. Mild pleural thickening in the left lower lobe periphery, with associated mild round atelectasis. There are areas of scarring and/or linear atelectasis in both lungs. Mild bronchial wall thickening in the bilateral lower lobes. Scarring in the bilateral lung apices. Pulmonary emphysema. Degenerative changes of the spine. Right chest wall edema. Unusual appearance of the cortex and endosteal portion of the visualized left humerus (series 2, image 19). ABDOMEN/PELVIS: Oncologic: No solid organ metastasis. No lymphadenopathy. No lytic or blastic osseous metastases are seen. Non oncologic: Cholelithiasis. Unremarkable appearance of the liver, spleen, pancreas, and kidneys. Bilateral adrenal gland thickening. No free air. Mild perihepatic, bilateral pericolic gutter, and pelvic free fluid. No bowel obstruction. Status post appendectomy per history. Unremarkable bladder. Prominent bilateral pelvic veins, which are nonspecific but can be seen in the setting pelvic congestion syndrome. No abdominal aortic aneurysm. Atherosclerotic disease. Degenerative changes and mild scoliosis of the spine. Mild degenerative changes of the hips. Anasarca, greater in the right abdominal wall. CHEST: Oncologic: Right breast mass and right axillary lymphadenopathy again noted. A right paratracheal 9 mm short axis lymph node is nonspecific. Otherwise, no evidence of metastatic disease in the chest. Non oncologic: Right chest wall edema. Pulmonary emphysema. Mild bronchitis bilaterally. Pulmonary scarring bilaterally. Trace pleural effusions. Cardiomegaly. Atherosclerotic disease. Unusual thickened appearance of the cortex in the visualized left humerus; recommend x-rays of the left humerus for further evaluation. ABDOMEN/PELVIS: Oncologic: No evidence of metastatic disease in the abdomen/pelvis. Non oncologic: Right abdominal wall edema. Cholelithiasis. Small amount of generalized free fluid. No free air. CT ABDOMEN PELVIS W IV CONTRAST Additional Contrast? Oral    Result Date: 7/15/2022  EXAMINATION: CT OF THE ABDOMEN AND PELVIS WITH CONTRAST; CT OF THE CHEST WITH CONTRAST 7/15/2022 3:43 pm TECHNIQUE: CT of the abdomen and pelvis was performed with the administration of intravenous contrast. Multiplanar reformatted images are provided for review. Automated exposure control, iterative reconstruction, and/or weight based adjustment of the mA/kV was utilized to reduce the radiation dose to as low as reasonably achievable.; CT of the chest was performed with the administration of intravenous contrast. Multiplanar reformatted images are provided for review. Automated exposure control, iterative reconstruction, and/or weight based adjustment of the mA/kV was utilized to reduce the radiation dose to as low as reasonably achievable. COMPARISON: CTA chest 07/13/2022 HISTORY: ORDERING SYSTEM PROVIDED HISTORY: breast cancer staging TECHNOLOGIST PROVIDED HISTORY: Additional Contrast?->Oral Reason for exam:->breast cancer staging What reading provider will be dictating this exam?->CRC FINDINGS: CHEST: Oncologic: Right breast mass again noted. Right axillary lymphadenopathy again noted. No pathologically enlarged the mediastinal or hilar lymph nodes. A 9 mm right lower paratracheal (series 2, image 71) lymph node is prominent but nonspecific.  No pulmonary mass or suspicious pulmonary nodules. No lytic or blastic osseous metastases are seen. Non oncologic: Unremarkable thyroid. Atherosclerotic disease including coronary disease. No thoracic aortic aneurysm or dissection. No pulmonary embolism. Cardiomegaly. No pericardial effusion. Unremarkable esophagus. Trace right pleural effusion. Mild pleural thickening in the left lower lobe periphery, with associated mild round atelectasis. There are areas of scarring and/or linear atelectasis in both lungs. Mild bronchial wall thickening in the bilateral lower lobes. Scarring in the bilateral lung apices. Pulmonary emphysema. Degenerative changes of the spine. Right chest wall edema. Unusual appearance of the cortex and endosteal portion of the visualized left humerus (series 2, image 19). ABDOMEN/PELVIS: Oncologic: No solid organ metastasis. No lymphadenopathy. No lytic or blastic osseous metastases are seen. Non oncologic: Cholelithiasis. Unremarkable appearance of the liver, spleen, pancreas, and kidneys. Bilateral adrenal gland thickening. No free air. Mild perihepatic, bilateral pericolic gutter, and pelvic free fluid. No bowel obstruction. Status post appendectomy per history. Unremarkable bladder. Prominent bilateral pelvic veins, which are nonspecific but can be seen in the setting pelvic congestion syndrome. No abdominal aortic aneurysm. Atherosclerotic disease. Degenerative changes and mild scoliosis of the spine. Mild degenerative changes of the hips. Anasarca, greater in the right abdominal wall. CHEST: Oncologic: Right breast mass and right axillary lymphadenopathy again noted. A right paratracheal 9 mm short axis lymph node is nonspecific. Otherwise, no evidence of metastatic disease in the chest. Non oncologic: Right chest wall edema. Pulmonary emphysema. Mild bronchitis bilaterally. Pulmonary scarring bilaterally. Trace pleural effusions. Cardiomegaly. Atherosclerotic disease.  Unusual thickened appearance of the cortex in the visualized left humerus; recommend x-rays of the left humerus for further evaluation. ABDOMEN/PELVIS: Oncologic: No evidence of metastatic disease in the abdomen/pelvis. Non oncologic: Right abdominal wall edema. Cholelithiasis. Small amount of generalized free fluid. No free air. CTA PULMONARY W CONTRAST    Result Date: 7/13/2022  EXAMINATION: CTA OF THE CHEST 7/13/2022 11:52 am TECHNIQUE: CTA of the chest was performed after the administration of intravenous contrast.  Multiplanar reformatted images are provided for review. MIP images are provided for review. Automated exposure control, iterative reconstruction, and/or weight based adjustment of the mA/kV was utilized to reduce the radiation dose to as low as reasonably achievable. COMPARISON: None. HISTORY: ORDERING SYSTEM PROVIDED HISTORY: Right breast mass, swelling of b/l lower extremities, swelling of R UE, elevated troponin & BNP, hx of smoking TECHNOLOGIST PROVIDED HISTORY: Reason for exam:->Right breast mass, swelling of b/l lower extremities, swelling of R UE, elevated troponin & BNP, hx of smoking Decision Support Exception - unselect if not a suspected or confirmed emergency medical condition->Emergency Medical Condition (MA) What reading provider will be dictating this exam?->CRC FINDINGS: Pulmonary Arteries: Pulmonary arteries are adequately opacified for evaluation. No evidence of intraluminal filling defect to suggest pulmonary embolism. Main pulmonary artery is normal in caliber. Mediastinum: No evidence of mediastinal lymphadenopathy. The heart is enlarged. There is significant right atrial enlargement. Lungs/pleura: There are emphysematous changes. There is no pulmonary infiltrate, mass or suspicious pulmonary nodule. Significant biapical pleuroparenchymal scarring is noted. There is pleuroparenchymal scarring and atelectasis seen within the left lower lobe.  There is no right or left pleural effusion. There are no findings of paul pulmonary edema. Upper Abdomen: Limited images of the upper abdomen are unremarkable. Soft Tissues/Bones: There is a irregular soft tissue mass seen within the right breast which is irregular. The right breast mass measures approximately 6 cm x 4 cm by 5.4 cm. There is right lateral chest wall edema. There is no adjacent bony erosion. There is skin thickening of the right breast.     1. There is no pulmonary embolus. 2. Irregular soft tissue mass within the right breast measuring 6 cm x 4 cm x 5.4 cm. There is adjacent skin thickening as well as soft tissue edema within the right lateral chest wall. There are enlarged lymph nodes within the right axilla. This finding is highly suggestive of breast malignancy. 3. There is no appreciable adjacent metastatic disease to the right ribs. 4. Emphysematous changes. There is no pulmonary infiltrate 5. Pleuroparenchymal scarring and atelectasis seen within the left lung base. 6. Cardiomegaly with significant right atrial enlargement. CTA PULMONARY W CONTRAST    Result Date: 6/19/2022  EXAMINATION: CTA OF THE CHEST 6/18/2022 11:04 pm TECHNIQUE: CTA of the chest was performed after the administration of intravenous contrast.  Multiplanar reformatted images are provided for review. MIP images are provided for review. Automated exposure control, iterative reconstruction, and/or weight based adjustment of the mA/kV was utilized to reduce the radiation dose to as low as reasonably achievable. COMPARISON: None.  HISTORY: ORDERING SYSTEM PROVIDED HISTORY: right breast mass, leg swelling/possible DVT, eval for PE TECHNOLOGIST PROVIDED HISTORY: Reason for exam:->right breast mass, leg swelling/possible DVT, eval for PE Decision Support Exception - unselect if not a suspected or confirmed emergency medical condition->Emergency Medical Condition (MA) FINDINGS: Pulmonary Arteries: Pulmonary arteries are adequately opacified for evaluation. No evidence of intraluminal filling defect to suggest pulmonary embolism. Main pulmonary artery is normal in caliber. Mediastinum: No evidence of mediastinal lymphadenopathy. Cardiomegaly. There is no acute abnormality of the thoracic aorta. Lungs/pleura: The lungs are without acute process. No focal consolidation or pulmonary edema. No evidence of pleural effusion or pneumothorax. Upper Abdomen: Limited images of the upper abdomen are unremarkable. Soft Tissues/Bones: No osseous abnormality. Right breast asymmetrical density and skin thickening. No evidence of pulmonary embolism or acute pulmonary abnormality. Right breast asymmetrical density compared to the left breast with skin thickening. US DUP UPPER EXTREMITY RIGHT VENOUS    Result Date: 2022  Patient MRN:  [id number] : 1955 Age: 78 years Gender: Female Order Date:  2022 6:32 PM EXAM: US DUP UPPER EXTREMITY RIGHT VENOUS NUMBER OF IMAGES:  20 INDICATION:  pain, swelling pain, swelling What reading provider will be dictating this exam?->[facility] COMPARISON: None The right cephalic vein is not seen Within the visualized vessels, there is no evidence for deep venous thrombosis There is good compressibility, there is good augmentation, there is good color flow. Within the visualized vessels there is no evidence for deep venous thrombosis     NM BONE SCAN WHOLE BODY    Result Date: 2022  Patient MRN: [id number] : 1955 Age:  78 years Gender: Female Order Date: 2022 9:37 AM Exam: NM BONE SCAN WHOLE BODY Number of Images: 2 views Indication:   breast cancer breast cancer What reading provider will be dictating this exam?->[facility] Comparison: CT chest 7/15/2022 Findings: Bone scan was performed following the injection of 20 mCi of MDP. Tracer uptake is seen compatible with degenerative changes.  Tracer uptake is seen in both involving both knees and to lesser extent the shoulders and wrists consistent with degenerative changes. Similar findings are seen involving the left foot. Tracer uptake is seen in the right anterior ribs. There appear to be 4 sequential regions of tracer uptake in the right anterior ribs beginning with the third and involving the fourth fifth and sixth ribs. When compared with the previous CT scan of the chest there are blastic lesions in this location with associated fractures. Pathologic fracture could give this appearance. This could be posttraumatic. Metastatic disease is not excluded. The remaining biodistribution of radiotracer appears to be within normal limits     Findings compatible with degenerative changes , tracer uptake in the right anterior ribs which within comparing with the CT of the chest is concerning for pathologic fracture. US BREAST LIMITED LEFT    Result Date: 7/15/2022  EXAMINATION: DIAGNOSTIC DIGITAL BILATERAL BREASTS MAMMOGRAM WITH TOMOSYNTHESIS; TARGETED ULTRASOUND OF THE RIGHT BREAST; TARGETED ULTRASOUND OF THE LEFT BREAST, 7/15/2022 10:35 am TECHNIQUE: Diagnostic mammography of the bilateral breasts was performed with tomosynthesis. 2D standard and 3D tomosynthesis combination imaging performed through both breasts. Computer aided detection was utilized in the interpretation of this exam.; Targeted ultrasound of the right breast was performed.; Target ultrasound of the left breast was performed. Views: Right cc and bilateral MLO views with tomosynthesis COMPARISON: Multiple prior studies, the most recent dated July 13, 2022 HISTORY: ORDERING SYSTEM PROVIDED HISTORY: Breast mass, right TECHNOLOGIST PROVIDED HISTORY: [address] Patient in 0B Reason for exam:->Irregular soft tissue mass within the right breast measuring 6 cm x 4 cm x FINDINGS: Bilateral diagnostic mammogram: Exam is suboptimal due to lack of left CC view. The right breast is smaller than the left. There is diffuse subcutaneous edema and skin thickening on the right.   There are bilateral focal asymmetries. Bilateral diagnostic ultrasound: Targeted bilateral breast ultrasound was performed utilizing high-resolution, real-time scanning. In the right breast 12 o'clock 1 cm from the nipple, there is a 4.9 x 3.5 x 3.9 cm irregular, hypoechoic mass with angular margins. In the right axilla, there are two abnormal right axillary lymph nodes. There are scattered complicated cysts of varying size in the left breast.  No suspicious cystic or solid mass identified on the left. 1.  Irregular mass in the right breast 12 o'clock and right axillary lymphadenopathy are suspicious. 2.  Benign findings with no mammographic or sonographic evidence of malignancy in the left breast. Recommendation: Ultrasound-guided core biopsies of the right breast and right axilla are advised. BIRADS: BIRADS - CATEGORY 4 Suspicious Abnormality. Biopsy should be considered at this time. OVERALL ASSESSMENT - SUSPICIOUS A letter of notification will be sent to the patient regarding the results. RISK ASSESSMENT: During this patient's visit, information obtained was used to generate a lifetime risk assessment using the Tyrer-Cuzick model (also called the ADORE International Breast Cancer Intervention study Breast Cancer Risk Evaluation Tool). LIFETIME RISK: Patient has a Tyrer-Cuzick score of: 3.5% BREAST TISSUE DENSITY Heterogeneously Dense (grade C) AVERAGE RISK ( < 15% Lifetime Risk) Yearly screening mammograms starting at age 36 and older. Regardless of breast density, tomosynthesis is suggested. Monthly self-breast exams are recommended for women age 27 and older. Note: Mammography is not 100% accurate in the detection of breast cancer. Accuracy decreases as mammographic density of the breast increases. A negative mammogram should not deter further evaluation (including biopsy) of suspicious physical findings.   Recommendations are based on NCCN ([employer]) and ACR ([employer]) axillary lymphadenopathy are suspicious. 2.  Benign findings with no mammographic or sonographic evidence of malignancy in the left breast. Recommendation: Ultrasound-guided core biopsies of the right breast and right axilla are advised. BIRADS: BIRADS - CATEGORY 4 Suspicious Abnormality. Biopsy should be considered at this time. OVERALL ASSESSMENT - SUSPICIOUS A letter of notification will be sent to the patient regarding the results. RISK ASSESSMENT: During this patient's visit, information obtained was used to generate a lifetime risk assessment using the Tyrer-Cuzick model (also called the ADORE International Breast Cancer Intervention study Breast Cancer Risk Evaluation Tool). LIFETIME RISK: Patient has a Tyrer-Cuzick score of: 3.5% BREAST TISSUE DENSITY Heterogeneously Dense (grade C) AVERAGE RISK ( < 15% Lifetime Risk) Yearly screening mammograms starting at age 36 and older. Regardless of breast density, tomosynthesis is suggested. Monthly self-breast exams are recommended for women age 27 and older. Note: Mammography is not 100% accurate in the detection of breast cancer. Accuracy decreases as mammographic density of the breast increases. A negative mammogram should not deter further evaluation (including biopsy) of suspicious physical findings. Recommendations are based on NCCN ([employer]) and ACR [employer]) guidelines. Thank you for sending your patient to this ACR and FDA approved facility. If there are physician concerns regarding this report, a Radiologist can be reached by calling the following number [phone].     US BREAST BIOPSY W LOC DEVICE 1ST LESION RIGHT    Result Date: 7/15/2022  EXAMINATION: ULTRASOUND-GUIDED right BREAST BIOPSY ULTRASOUND-GUIDED CLIP PLACEMENT 7/15/2022 HISTORY: ORDERING SYSTEM PROVIDED HISTORY: Breast mass, right TECHNOLOGIST PROVIDED HISTORY: [facility] Protocol Patient in [address] Reason for exam:->Irregular soft tissue mass within the right breast measuring 6 cm x 4 cm x 5.4 cm found on recent CTA chest on 7/13/2022 Reason for exam:->[facility] Protocol COMPARISON: July 15, 2022 TECHNIQUE: A timeout was performed to confirm patient identification and site of procedure. Risks, benefits, and alternatives of the procedure were discussed. Informed written consent was obtained. Transverse and longitudinal gray scale images were obtained of the right breast 12 o'clock mass. The biopsy site was prepped in standard fashion. 1% lidocaine was used for local anesthesia and a small skin incision was made. A 14-gauge core biopsy needle was advanced under sonographic guidance via a lateral approach. 3 cores were obtained and the needle was removed. A ribbon biopsy clip was placed at the biopsy site under ultrasound guidance. Pressure was applied for hemostasis. The patient tolerated the procedure well with no immediate complications. Technically successful ultrasound guided core biopsy of the right breast 12 o'clock mass and ribbon clip marker placement as described above. BIRADS: ZW - Pathology pending. [facility] US GUIDED NEEDLE PLACEMENT    Result Date: 7/15/2022  PROCEDURE: ULTRASOUND GUIDED CORE BIOPSY LYMPH NODE Ultrasound-guided clip placement 7/15/2022 HISTORY: ORDERING SYSTEM PROVIDED HISTORY: Abnormal ultrasound TECHNOLOGIST PROVIDED HISTORY: [address] [facility] [address] Protocol Patient in [address] Reason for exam:->Abnormal ultrasound PHYSICIANS: Xin Qiu SEDATION: None. PROCEDURE: Following discussion of alternatives, risks, and benefits, informed consent was obtained. A preprocedural time out was performed. The right axilla was prepped and draped in the usual sterile fashion. Local anesthesia was achieved with buffered 1 percent lidocaine. A 14 gauge core biopsy needle was utilized under real-time sonographic guidance to collect 3 cores from the right axillary lymphadenopathy. A ribbon shaped clip was placed.  Hemostasis was obtained with manual compression. The patient tolerated the procedure without complication, verbalized understanding of postprocedural care instructions, and was discharged home in satisfactory condition. Surgical pathology report is pending at the time of the study, and addendum will be generated when this information becomes available. Successful ULTRASOUND guided core biopsy of right axillary lymphadenopathy. Pathology pending. [facility]HEEN DIGITAL DIAGNOSTIC BILATERAL    Result Date: 7/15/2022  EXAMINATION: DIAGNOSTIC DIGITAL BILATERAL BREASTS MAMMOGRAM WITH TOMOSYNTHESIS; TARGETED ULTRASOUND OF THE RIGHT BREAST; TARGETED ULTRASOUND OF THE LEFT BREAST, 7/15/2022 10:35 am TECHNIQUE: Diagnostic mammography of the bilateral breasts was performed with tomosynthesis. 2D standard and 3D tomosynthesis combination imaging performed through both breasts. Computer aided detection was utilized in the interpretation of this exam.; Targeted ultrasound of the right breast was performed.; Target ultrasound of the left breast was performed. Views: Right cc and bilateral MLO views with tomosynthesis COMPARISON: Multiple prior studies, the most recent dated July 13, 2022 HISTORY: ORDERING SYSTEM PROVIDED HISTORY: Breast mass, right TECHNOLOGIST PROVIDED HISTORY: [address] Patient in 0B Reason for exam:->Irregular soft tissue mass within the right breast measuring 6 cm x 4 cm x FINDINGS: Bilateral diagnostic mammogram: Exam is suboptimal due to lack of left CC view. The right breast is smaller than the left. There is diffuse subcutaneous edema and skin thickening on the right. There are bilateral focal asymmetries. Bilateral diagnostic ultrasound: Targeted bilateral breast ultrasound was performed utilizing high-resolution, real-time scanning. In the right breast 12 o'clock 1 cm from the nipple, there is a 4.9 x 3.5 x 3.9 cm irregular, hypoechoic mass with angular margins.   In the right axilla, there are two abnormal right axillary lymph nodes. There are scattered complicated cysts of varying size in the left breast.  No suspicious cystic or solid mass identified on the left. 1.  Irregular mass in the right breast 12 o'clock and right axillary lymphadenopathy are suspicious. 2.  Benign findings with no mammographic or sonographic evidence of malignancy in the left breast. Recommendation: Ultrasound-guided core biopsies of the right breast and right axilla are advised. BIRADS: BIRADS - CATEGORY 4 Suspicious Abnormality. Biopsy should be considered at this time. OVERALL ASSESSMENT - SUSPICIOUS A letter of notification will be sent to the patient regarding the results. RISK ASSESSMENT: During this patient's visit, information obtained was used to generate a lifetime risk assessment using the Tyrer-Cuzick model (also called the ADORE International Breast Cancer Intervention study Breast Cancer Risk Evaluation Tool). LIFETIME RISK: Patient has a Tyrer-Cuzick score of: 3.5% BREAST TISSUE DENSITY Heterogeneously Dense (grade C) AVERAGE RISK ( < 15% Lifetime Risk) Yearly screening mammograms starting at age 36 and older. Regardless of breast density, tomosynthesis is suggested. Monthly self-breast exams are recommended for women age 27 and older. Note: Mammography is not 100% accurate in the detection of breast cancer. Accuracy decreases as mammographic density of the breast increases. A negative mammogram should not deter further evaluation (including biopsy) of suspicious physical findings. Recommendations are based on NCCN ([address]) and ACR [employer]) guidelines. Thank you for sending your patient to this ACR and FDA approved facility. If there are physician concerns regarding this report, a Radiologist can be reached by calling the following number [phone].     US DUP LOWER EXTREMITIES BILATERAL VENOUS    Result Date: 2022  Patient MRN:  [id number] : 1955 Age: 78 years Gender: Female Order Date:  7/12/2022 6:32 PM EXAM: US DUP LOWER EXTREMITIES BILATERAL VENOUS NUMBER OF IMAGES:  48 INDICATION:  swelling swelling What reading provider will be dictating this exam?->[facility] COMPARISON: None Within the visualized vessels, there is no evidence for deep venous thrombosis There is good compressibility, there is good augmentation, there is good color flow. Within the visualized vessels there is no evidence for deep venous thrombosis     [facility] US GUIDED PLACE LOC DEVICE PERC 1ST LESION    Result Date: 7/15/2022  PROCEDURE: ULTRASOUND GUIDED CORE BIOPSY LYMPH NODE Ultrasound-guided clip placement 7/15/2022 HISTORY: ORDERING SYSTEM PROVIDED HISTORY: Abnormal ultrasound TECHNOLOGIST PROVIDED HISTORY: [address] [place] [address] Dr Moses Protocol Patient in [address] Reason for exam:->Abnormal ultrasound PHYSICIANS: Jo Nesbitt SEDATION: None. PROCEDURE: Following discussion of alternatives, risks, and benefits, informed consent was obtained. A preprocedural time out was performed. The right axilla was prepped and draped in the usual sterile fashion. Local anesthesia was achieved with buffered 1 percent lidocaine. A 14 gauge core biopsy needle was utilized under real-time sonographic guidance to collect 3 cores from the right axillary lymphadenopathy. A ribbon shaped clip was placed. Hemostasis was obtained with manual compression. The patient tolerated the procedure without complication, verbalized understanding of postprocedural care instructions, and was discharged home in satisfactory condition. Surgical pathology report is pending at the time of the study, and addendum will be generated when this information becomes available. Successful ULTRASOUND guided core biopsy of right axillary lymphadenopathy. Pathology pending.       Discharge Medications:      Medication List        START taking these medications      carvedilol 6.25 MG tablet  Commonly known as: COREG  Take 1 tablet by mouth in the morning and 1 tablet in the evening. Take with meals. furosemide 40 MG tablet  Commonly known as: LASIX  Take 1 tablet by mouth in the morning. Start taking on: July 17, 2022     potassium chloride 20 MEQ extended release tablet  Commonly known as: KLOR-CON M  Take 2 tablets by mouth in the morning. STOP taking these medications      ibuprofen 200 MG tablet  Commonly known as: ADVIL;MOTRIN               Where to Get Your Medications        These medications were sent to [address] [place] [address], [address], [address] [place] [id number]      Phone: [phone]   carvedilol 6.25 MG tablet  furosemide 40 MG tablet  potassium chloride 20 MEQ extended release tablet         Time Spent on discharge is 35 minutes in the examination, evaluation, counseling and review of medications and discharge plan.    +++++++++++++++++++++++++++++++++++++++++++++++++  Td Raza MD  [address] [place] [address]  +++++++++++++++++++++++++++++++++++++++++++++++++  NOTE: This report was transcribed using voice recognition software. Every effort was made to ensure accuracy; however, inadvertent computerized transcription errors may be present.

## 2022-07-16 NOTE — PLAN OF CARE
Problem: Discharge Planning  Goal: Discharge to home or other facility with appropriate resources  Outcome: Adequate for Discharge     Problem: Pain  Goal: Verbalizes/displays adequate comfort level or baseline comfort level  Outcome: Adequate for Discharge     Problem: Safety - Adult  Goal: Free from fall injury  Outcome: Adequate for Discharge     Problem: Chronic Conditions and Co-morbidities  Goal: Patient's chronic conditions and co-morbidity symptoms are monitored and maintained or improved  Outcome: Adequate for Discharge     Problem: Cardiovascular - Adult  Goal: Maintains optimal cardiac output and hemodynamic stability  Outcome: Adequate for Discharge     Problem: Metabolic/Fluid and Electrolytes - Adult  Goal: Hemodynamic stability and optimal renal function maintained  Outcome: Adequate for Discharge

## 2022-07-16 NOTE — PROGRESS NOTES
Physical Therapy    Initial Assessment     Name: Martha Grimes  : 1955  MRN: [id number]      Date of Service: 2022    Evaluating PT: Shannan Fernandes PT, DPT [id number]      Room #:  7926/5351-F  Diagnosis:  Elevated troponin [R77.8]  Mass of right breast [N63.10]  Acute respiratory failure with hypoxia (HCC) [J96.01]  Bilateral lower extremity edema [R60.0]  Edema of right upper extremity [R60.0]  Breast mass, right [N63.10]  PMHx/PSHx:  OA, chronic bronchitis, emphysema, anxiety  Precautions:  Fall risk, O2, anxious    SUBJECTIVE:    Pt lives with 7 y/o grandson in a 2nd story apartment unit with 7 stair(s) and 2 rail(s) to enter. Pt ambulated without AD prior to admission. OBJECTIVE:   Initial Evaluation  Date: 22 Treatment Date: Short Term/ Long Term   Goals   AM-PAC 6 Clicks      Was pt agreeable to Eval/treatment? Yes     Does pt have pain? 10 R breast pain     Bed Mobility  Rolling: NT  Supine to sit: Supervision  Sit to supine: NT  Scooting: Supervision to EOB  Rolling: Independent   Supine to sit: Independent   Sit to supine: Independent   Scooting: Independent    Transfers Sit to stand: SBA  Stand to sit: SBA  Stand pivot: Min A without AD, SBA with [employer]  Sit to stand: Independent   Stand to sit: Independent   Stand pivot: Mod Independent with [employer]   Ambulation   50 feet x2 without AD with Min A  100 feet with [employer] with SBA  >400 feet with [employer] Mod Independent    Stair negotiation: ascended and descended 4 step(s) with 2 rail(s) with SBA  >7 step(s) with 2 rail(s) with Supervision   ROM BUE: Refer to OT note  BLE: WFL     Strength BUE: Refer to OT note  BLE: WFL     Balance Sitting EOB: Independent   Dynamic Standing: SBA without AD and with [employer]  Sitting EOB: Independent   Dynamic Standing: Mod Independent with [employer]     Pt is A & O x: 4 to person, place, month/year, and situation. Sensation: Pt denies numbness and tingling of extremities.   Edema: B LE swelling noted but has improved since admission. Patient education  Pt educated on safety on stairs. Patient response to education:   Pt verbalized understanding Pt demonstrated skill Pt requires further education in this area   Yes With cues Yes     ASSESSMENT:    Conditions Requiring Skilled Therapeutic Intervention:    [x]Decreased strength     []Decreased ROM  [x]Decreased functional mobility  [x]Decreased balance   [x]Decreased endurance   []Decreased posture  []Decreased sensation  []Decreased coordination   []Decreased vision  [x]Decreased safety awareness   []Increased pain       Comments:    Pt was in bed upon room entry; agreeable to PT evaluation. Pt is anxious and emotional over current situation but was pleasant and cooperative. Pt ambulated initially without AD. Gait was slow and unsteady. Pt had several mild LOB that required Min A to correct. Pt trialed [employer] and steadiness improved with no LOB noted. Pt negotiated stairs with reciprocal step pattern. Pt was fairly steady. Pt ambulated back to room and was seated in chair. Pt remained on 3 L O2/min throughout session and O2 sat was 98% with all activity. Pt was left in chair with all needs met at conclusion of session. Treatment:  Patient practiced and was instructed in the following treatment:    Therapeutic activities:  Transfers: Pt was cued for hand placement during sit <> stand transfers. Pt completed x2 transfers from EOB. Ambulation: Pt ambulated x3 reps with and without [employer]. Pt was cued for [employer] technique and safety. Stairs: Pt was cued for safety and use of rails on stairs. Vitals and symptoms were closely monitored throughout session. Pt's/family goals:  1. To return home. Prognosis is Good for reaching above PT goals. Patient and or family understand(s) diagnosis, prognosis, and plan of care. Yes.     PHYSICAL THERAPY PLAN OF CARE:    PT POC is established based on physician order and patient diagnosis     Referring provider/PT Order:    Start   Ordering activities 99094 10 minutes  [] Therapeutic exercises 25491 0 minutes  [] Neuromuscular reeducation 12430 0 minutes     Luis Enrique Harrington PT, DPT  [id number]

## 2022-07-22 ENCOUNTER — TELEPHONE (OUTPATIENT)
Dept: SURGERY | Age: 67
End: 2022-07-22

## 2022-07-22 DIAGNOSIS — Z17.1 MALIGNANT NEOPLASM OF OVERLAPPING SITES OF RIGHT BREAST IN FEMALE, ESTROGEN RECEPTOR NEGATIVE (HCC): Primary | ICD-10-CM

## 2022-07-22 DIAGNOSIS — C50.811 MALIGNANT NEOPLASM OF OVERLAPPING SITES OF RIGHT BREAST IN FEMALE, ESTROGEN RECEPTOR NEGATIVE (HCC): Primary | ICD-10-CM

## 2022-07-22 NOTE — TELEPHONE ENCOUNTER
I called Bipin Foote and went over her pathology results and her CT scans and bone scan. She has been referred to oncology.

## 2022-07-25 ENCOUNTER — TELEPHONE (OUTPATIENT)
Dept: OTHER | Facility: CLINIC | Age: 67
End: 2022-07-25

## 2022-07-25 ENCOUNTER — HOSPITAL ENCOUNTER (INPATIENT)
Age: 67
LOS: 10 days | Discharge: HOSPICE | DRG: 064 | End: 2022-08-04
Attending: STUDENT IN AN ORGANIZED HEALTH CARE EDUCATION/TRAINING PROGRAM | Admitting: STUDENT IN AN ORGANIZED HEALTH CARE EDUCATION/TRAINING PROGRAM
Payer: MEDICARE

## 2022-07-25 ENCOUNTER — APPOINTMENT (OUTPATIENT)
Dept: CT IMAGING | Age: 67
DRG: 064 | End: 2022-07-25
Payer: MEDICARE

## 2022-07-25 DIAGNOSIS — E87.5 HYPERKALEMIA: ICD-10-CM

## 2022-07-25 DIAGNOSIS — Z51.5 END OF LIFE CARE: ICD-10-CM

## 2022-07-25 DIAGNOSIS — N17.9 ACUTE KIDNEY INJURY (HCC): ICD-10-CM

## 2022-07-25 DIAGNOSIS — I63.9 CEREBROVASCULAR ACCIDENT (CVA), UNSPECIFIED MECHANISM (HCC): Primary | ICD-10-CM

## 2022-07-25 DIAGNOSIS — G45.9 TIA (TRANSIENT ISCHEMIC ATTACK): ICD-10-CM

## 2022-07-25 DIAGNOSIS — N63.10 BREAST MASS, RIGHT: ICD-10-CM

## 2022-07-25 DIAGNOSIS — J96.02 ACUTE RESPIRATORY FAILURE WITH HYPOXIA AND HYPERCAPNIA (HCC): ICD-10-CM

## 2022-07-25 DIAGNOSIS — Z51.5 PALLIATIVE CARE ENCOUNTER: ICD-10-CM

## 2022-07-25 DIAGNOSIS — G89.3 PAIN DUE TO NEOPLASM: ICD-10-CM

## 2022-07-25 DIAGNOSIS — E43 SEVERE PROTEIN-CALORIE MALNUTRITION (HCC): Chronic | ICD-10-CM

## 2022-07-25 DIAGNOSIS — J96.01 ACUTE RESPIRATORY FAILURE WITH HYPOXIA AND HYPERCAPNIA (HCC): ICD-10-CM

## 2022-07-25 LAB
ACETAMINOPHEN LEVEL: <5 MCG/ML (ref 10–30)
AMPHETAMINE SCREEN, URINE: NOT DETECTED
ANION GAP SERPL CALCULATED.3IONS-SCNC: 12 MMOL/L (ref 7–16)
APTT: 34.7 SEC (ref 24.5–35.1)
B.E.: 1.6 MMOL/L (ref -3–3)
BACTERIA: ABNORMAL /HPF
BARBITURATE SCREEN URINE: NOT DETECTED
BASOPHILS ABSOLUTE: 0.03 E9/L (ref 0–0.2)
BASOPHILS RELATIVE PERCENT: 0.3 % (ref 0–2)
BENZODIAZEPINE SCREEN, URINE: POSITIVE
BILIRUBIN URINE: NEGATIVE
BLOOD, URINE: NEGATIVE
BUN BLDV-MCNC: 73 MG/DL (ref 6–23)
CALCIUM SERPL-MCNC: 8.6 MG/DL (ref 8.6–10.2)
CANNABINOID SCREEN URINE: NOT DETECTED
CHLORIDE BLD-SCNC: 93 MMOL/L (ref 98–107)
CLARITY: CLEAR
CO2: 32 MMOL/L (ref 22–29)
COCAINE METABOLITE SCREEN URINE: NOT DETECTED
COHB: 1.9 % (ref 0–1.5)
COLOR: YELLOW
CORTISOL TOTAL: 22.64 MCG/DL (ref 2.68–18.4)
CORTISOL TOTAL: 29.72 MCG/DL (ref 2.68–18.4)
CREAT SERPL-MCNC: 2.1 MG/DL (ref 0.5–1)
CRITICAL: ABNORMAL
DATE ANALYZED: ABNORMAL
DATE OF COLLECTION: ABNORMAL
EKG ATRIAL RATE: 59 BPM
EKG P AXIS: -30 DEGREES
EKG P-R INTERVAL: 104 MS
EKG Q-T INTERVAL: 436 MS
EKG QRS DURATION: 106 MS
EKG QTC CALCULATION (BAZETT): 431 MS
EKG R AXIS: 145 DEGREES
EKG T AXIS: -24 DEGREES
EKG VENTRICULAR RATE: 59 BPM
EOSINOPHILS ABSOLUTE: 0.01 E9/L (ref 0.05–0.5)
EOSINOPHILS RELATIVE PERCENT: 0.1 % (ref 0–6)
EPITHELIAL CELLS, UA: ABNORMAL /HPF
ETHANOL: <10 MG/DL (ref 0–0.08)
FENTANYL SCREEN, URINE: NOT DETECTED
GFR AFRICAN AMERICAN: 28
GFR NON-AFRICAN AMERICAN: 24 ML/MIN/1.73
GLUCOSE BLD-MCNC: 115 MG/DL (ref 74–99)
GLUCOSE URINE: NEGATIVE MG/DL
HCO3: 31.6 MMOL/L (ref 22–26)
HCT VFR BLD CALC: 48.3 % (ref 34–48)
HEMOGLOBIN: 14.6 G/DL (ref 11.5–15.5)
HHB: 0.7 % (ref 0–5)
IMMATURE GRANULOCYTES #: 0.04 E9/L
IMMATURE GRANULOCYTES %: 0.4 % (ref 0–5)
INR BLD: 2
KETONES, URINE: NEGATIVE MG/DL
LAB: ABNORMAL
LEUKOCYTE ESTERASE, URINE: ABNORMAL
LYMPHOCYTES ABSOLUTE: 1.49 E9/L (ref 1.5–4)
LYMPHOCYTES RELATIVE PERCENT: 14.3 % (ref 20–42)
Lab: ABNORMAL
Lab: ABNORMAL
MAGNESIUM: 2.2 MG/DL (ref 1.6–2.6)
MCH RBC QN AUTO: 30.1 PG (ref 26–35)
MCHC RBC AUTO-ENTMCNC: 30.2 % (ref 32–34.5)
MCV RBC AUTO: 99.6 FL (ref 80–99.9)
METER GLUCOSE: 113 MG/DL (ref 74–99)
METHADONE SCREEN, URINE: NOT DETECTED
METHB: 0.4 % (ref 0–1.5)
MODE: ABNORMAL
MONOCYTES ABSOLUTE: 1.42 E9/L (ref 0.1–0.95)
MONOCYTES RELATIVE PERCENT: 13.7 % (ref 2–12)
NEUTROPHILS ABSOLUTE: 7.41 E9/L (ref 1.8–7.3)
NEUTROPHILS RELATIVE PERCENT: 71.2 % (ref 43–80)
NITRITE, URINE: NEGATIVE
O2 CONTENT: 20.7 ML/DL
O2 SATURATION: 99.3 % (ref 92–98.5)
O2HB: 97 % (ref 94–97)
OPERATOR ID: 421
OPIATE SCREEN URINE: NOT DETECTED
OXYCODONE URINE: NOT DETECTED
PATIENT TEMP: 37 C
PCO2: 76.7 MMHG (ref 35–45)
PDW BLD-RTO: 15.7 FL (ref 11.5–15)
PH BLOOD GAS: 7.23 (ref 7.35–7.45)
PH UA: 5.5 (ref 5–9)
PHENCYCLIDINE SCREEN URINE: NOT DETECTED
PHOSPHORUS: 5 MG/DL (ref 2.5–4.5)
PLATELET # BLD: 137 E9/L (ref 130–450)
PMV BLD AUTO: 10 FL (ref 7–12)
PO2: 202.3 MMHG (ref 75–100)
POTASSIUM REFLEX MAGNESIUM: 6.4 MMOL/L (ref 3.5–5)
POTASSIUM SERPL-SCNC: 5.4 MMOL/L (ref 3.5–5)
PRO-BNP: ABNORMAL PG/ML (ref 0–125)
PROTEIN UA: ABNORMAL MG/DL
PROTHROMBIN TIME: 22.5 SEC (ref 9.3–12.4)
RBC # BLD: 4.85 E12/L (ref 3.5–5.5)
RBC UA: ABNORMAL /HPF (ref 0–2)
SALICYLATE, SERUM: <0.3 MG/DL (ref 0–30)
SODIUM BLD-SCNC: 137 MMOL/L (ref 132–146)
SOURCE, BLOOD GAS: ABNORMAL
SPECIFIC GRAVITY UA: 1.01 (ref 1–1.03)
THB: 14.9 G/DL (ref 11.5–16.5)
TIME ANALYZED: 1508
TOTAL CK: 31 U/L (ref 20–180)
TRICYCLIC ANTIDEPRESSANTS SCREEN SERUM: NEGATIVE NG/ML
TROPONIN, HIGH SENSITIVITY: 64 NG/L (ref 0–9)
TROPONIN, HIGH SENSITIVITY: 66 NG/L (ref 0–9)
UROBILINOGEN, URINE: 1 E.U./DL
WBC # BLD: 10.4 E9/L (ref 4.5–11.5)
WBC UA: ABNORMAL /HPF (ref 0–5)

## 2022-07-25 PROCEDURE — 82962 GLUCOSE BLOOD TEST: CPT

## 2022-07-25 PROCEDURE — 6360000002 HC RX W HCPCS: Performed by: STUDENT IN AN ORGANIZED HEALTH CARE EDUCATION/TRAINING PROGRAM

## 2022-07-25 PROCEDURE — 2060000000 HC ICU INTERMEDIATE R&B

## 2022-07-25 PROCEDURE — 99449 NTRPROF PH1/NTRNET/EHR 31/>: CPT | Performed by: PSYCHIATRY & NEUROLOGY

## 2022-07-25 PROCEDURE — 6360000002 HC RX W HCPCS

## 2022-07-25 PROCEDURE — 84100 ASSAY OF PHOSPHORUS: CPT

## 2022-07-25 PROCEDURE — 80143 DRUG ASSAY ACETAMINOPHEN: CPT

## 2022-07-25 PROCEDURE — 82533 TOTAL CORTISOL: CPT

## 2022-07-25 PROCEDURE — 85025 COMPLETE CBC W/AUTO DIFF WBC: CPT

## 2022-07-25 PROCEDURE — 85730 THROMBOPLASTIN TIME PARTIAL: CPT

## 2022-07-25 PROCEDURE — 71275 CT ANGIOGRAPHY CHEST: CPT

## 2022-07-25 PROCEDURE — 83880 ASSAY OF NATRIURETIC PEPTIDE: CPT

## 2022-07-25 PROCEDURE — 96361 HYDRATE IV INFUSION ADD-ON: CPT

## 2022-07-25 PROCEDURE — 93005 ELECTROCARDIOGRAM TRACING: CPT | Performed by: STUDENT IN AN ORGANIZED HEALTH CARE EDUCATION/TRAINING PROGRAM

## 2022-07-25 PROCEDURE — 82550 ASSAY OF CK (CPK): CPT

## 2022-07-25 PROCEDURE — 96374 THER/PROPH/DIAG INJ IV PUSH: CPT

## 2022-07-25 PROCEDURE — 0042T CT BRAIN PERFUSION: CPT

## 2022-07-25 PROCEDURE — 94640 AIRWAY INHALATION TREATMENT: CPT

## 2022-07-25 PROCEDURE — 80179 DRUG ASSAY SALICYLATE: CPT

## 2022-07-25 PROCEDURE — 81001 URINALYSIS AUTO W/SCOPE: CPT

## 2022-07-25 PROCEDURE — 82077 ASSAY SPEC XCP UR&BREATH IA: CPT

## 2022-07-25 PROCEDURE — 6360000004 HC RX CONTRAST MEDICATION: Performed by: RADIOLOGY

## 2022-07-25 PROCEDURE — 2500000003 HC RX 250 WO HCPCS: Performed by: STUDENT IN AN ORGANIZED HEALTH CARE EDUCATION/TRAINING PROGRAM

## 2022-07-25 PROCEDURE — 83735 ASSAY OF MAGNESIUM: CPT

## 2022-07-25 PROCEDURE — 84132 ASSAY OF SERUM POTASSIUM: CPT

## 2022-07-25 PROCEDURE — 70450 CT HEAD/BRAIN W/O DYE: CPT

## 2022-07-25 PROCEDURE — 84484 ASSAY OF TROPONIN QUANT: CPT

## 2022-07-25 PROCEDURE — 99285 EMERGENCY DEPT VISIT HI MDM: CPT

## 2022-07-25 PROCEDURE — 36415 COLL VENOUS BLD VENIPUNCTURE: CPT

## 2022-07-25 PROCEDURE — 70498 CT ANGIOGRAPHY NECK: CPT

## 2022-07-25 PROCEDURE — 2580000003 HC RX 258: Performed by: STUDENT IN AN ORGANIZED HEALTH CARE EDUCATION/TRAINING PROGRAM

## 2022-07-25 PROCEDURE — 80048 BASIC METABOLIC PNL TOTAL CA: CPT

## 2022-07-25 PROCEDURE — 85610 PROTHROMBIN TIME: CPT

## 2022-07-25 PROCEDURE — 70496 CT ANGIOGRAPHY HEAD: CPT

## 2022-07-25 PROCEDURE — 82805 BLOOD GASES W/O2 SATURATION: CPT

## 2022-07-25 PROCEDURE — 80307 DRUG TEST PRSMV CHEM ANLYZR: CPT

## 2022-07-25 PROCEDURE — 6370000000 HC RX 637 (ALT 250 FOR IP): Performed by: STUDENT IN AN ORGANIZED HEALTH CARE EDUCATION/TRAINING PROGRAM

## 2022-07-25 RX ORDER — ROSUVASTATIN CALCIUM 20 MG/1
20 TABLET, COATED ORAL NIGHTLY
Status: DISCONTINUED | OUTPATIENT
Start: 2022-07-25 | End: 2022-08-04 | Stop reason: HOSPADM

## 2022-07-25 RX ORDER — CALCIUM GLUCONATE 94 MG/ML
1000 INJECTION, SOLUTION INTRAVENOUS ONCE
Status: COMPLETED | OUTPATIENT
Start: 2022-07-25 | End: 2022-07-25

## 2022-07-25 RX ORDER — MIDAZOLAM HYDROCHLORIDE 2 MG/2ML
2 INJECTION, SOLUTION INTRAMUSCULAR; INTRAVENOUS ONCE
Status: COMPLETED | OUTPATIENT
Start: 2022-07-25 | End: 2022-07-25

## 2022-07-25 RX ORDER — IPRATROPIUM BROMIDE AND ALBUTEROL SULFATE 2.5; .5 MG/3ML; MG/3ML
1 SOLUTION RESPIRATORY (INHALATION)
Status: DISCONTINUED | OUTPATIENT
Start: 2022-07-25 | End: 2022-08-04 | Stop reason: HOSPADM

## 2022-07-25 RX ORDER — CLOPIDOGREL BISULFATE 75 MG/1
75 TABLET ORAL DAILY
Status: DISCONTINUED | OUTPATIENT
Start: 2022-07-26 | End: 2022-07-29

## 2022-07-25 RX ORDER — ASPIRIN 81 MG/1
324 TABLET, CHEWABLE ORAL ONCE
Status: DISCONTINUED | OUTPATIENT
Start: 2022-07-25 | End: 2022-07-30

## 2022-07-25 RX ORDER — 0.9 % SODIUM CHLORIDE 0.9 %
500 INTRAVENOUS SOLUTION INTRAVENOUS ONCE
Status: COMPLETED | OUTPATIENT
Start: 2022-07-25 | End: 2022-07-25

## 2022-07-25 RX ORDER — 0.9 % SODIUM CHLORIDE 0.9 %
1000 INTRAVENOUS SOLUTION INTRAVENOUS ONCE
Status: DISCONTINUED | OUTPATIENT
Start: 2022-07-25 | End: 2022-07-25

## 2022-07-25 RX ORDER — METHYLPREDNISOLONE SODIUM SUCCINATE 40 MG/ML
40 INJECTION, POWDER, LYOPHILIZED, FOR SOLUTION INTRAMUSCULAR; INTRAVENOUS ONCE
Status: COMPLETED | OUTPATIENT
Start: 2022-07-26 | End: 2022-07-25

## 2022-07-25 RX ORDER — METHYLPREDNISOLONE SODIUM SUCCINATE 40 MG/ML
40 INJECTION, POWDER, LYOPHILIZED, FOR SOLUTION INTRAMUSCULAR; INTRAVENOUS EVERY 12 HOURS
Status: DISCONTINUED | OUTPATIENT
Start: 2022-07-26 | End: 2022-07-26

## 2022-07-25 RX ORDER — COSYNTROPIN 0.25 MG/ML
250 INJECTION, POWDER, FOR SOLUTION INTRAMUSCULAR; INTRAVENOUS ONCE
Status: COMPLETED | OUTPATIENT
Start: 2022-07-26 | End: 2022-07-26

## 2022-07-25 RX ORDER — SODIUM CHLORIDE 9 MG/ML
INJECTION, SOLUTION INTRAVENOUS CONTINUOUS
Status: DISCONTINUED | OUTPATIENT
Start: 2022-07-25 | End: 2022-07-27

## 2022-07-25 RX ORDER — DEXTROSE MONOHYDRATE 25 G/50ML
25 INJECTION, SOLUTION INTRAVENOUS PRN
Status: DISCONTINUED | OUTPATIENT
Start: 2022-07-25 | End: 2022-08-04 | Stop reason: HOSPADM

## 2022-07-25 RX ORDER — ENOXAPARIN SODIUM 100 MG/ML
30 INJECTION SUBCUTANEOUS DAILY
Status: DISCONTINUED | OUTPATIENT
Start: 2022-07-25 | End: 2022-07-29

## 2022-07-25 RX ORDER — ASPIRIN 81 MG/1
81 TABLET ORAL DAILY
Status: DISCONTINUED | OUTPATIENT
Start: 2022-07-26 | End: 2022-07-30

## 2022-07-25 RX ORDER — DOXYCYCLINE HYCLATE 100 MG/1
100 CAPSULE ORAL EVERY 12 HOURS SCHEDULED
Status: DISCONTINUED | OUTPATIENT
Start: 2022-07-25 | End: 2022-07-27

## 2022-07-25 RX ORDER — METHYLPREDNISOLONE SODIUM SUCCINATE 125 MG/2ML
125 INJECTION, POWDER, LYOPHILIZED, FOR SOLUTION INTRAMUSCULAR; INTRAVENOUS ONCE
Status: COMPLETED | OUTPATIENT
Start: 2022-07-25 | End: 2022-07-25

## 2022-07-25 RX ORDER — DEXTROSE MONOHYDRATE 25 G/50ML
25 INJECTION, SOLUTION INTRAVENOUS ONCE
Status: COMPLETED | OUTPATIENT
Start: 2022-07-25 | End: 2022-07-25

## 2022-07-25 RX ADMIN — IPRATROPIUM BROMIDE AND ALBUTEROL SULFATE 1 AMPULE: .5; 2.5 SOLUTION RESPIRATORY (INHALATION) at 20:07

## 2022-07-25 RX ADMIN — DEXTROSE MONOHYDRATE 25 G: 25 INJECTION, SOLUTION INTRAVENOUS at 17:08

## 2022-07-25 RX ADMIN — SODIUM CHLORIDE 500 ML: 9 INJECTION, SOLUTION INTRAVENOUS at 14:51

## 2022-07-25 RX ADMIN — CALCIUM GLUCONATE 1000 MG: 98 INJECTION, SOLUTION INTRAVENOUS at 17:08

## 2022-07-25 RX ADMIN — SODIUM BICARBONATE 50 MEQ: 84 INJECTION INTRAVENOUS at 17:09

## 2022-07-25 RX ADMIN — MIDAZOLAM 2 MG: 1 INJECTION INTRAMUSCULAR; INTRAVENOUS at 14:32

## 2022-07-25 RX ADMIN — SODIUM CHLORIDE: 9 INJECTION, SOLUTION INTRAVENOUS at 19:49

## 2022-07-25 RX ADMIN — SODIUM CHLORIDE 500 ML: 9 INJECTION, SOLUTION INTRAVENOUS at 17:00

## 2022-07-25 RX ADMIN — METHYLPREDNISOLONE SODIUM SUCCINATE 40 MG: 40 INJECTION, POWDER, FOR SOLUTION INTRAMUSCULAR; INTRAVENOUS at 23:59

## 2022-07-25 RX ADMIN — INSULIN HUMAN 5 UNITS: 100 INJECTION, SOLUTION PARENTERAL at 17:08

## 2022-07-25 RX ADMIN — ENOXAPARIN SODIUM 30 MG: 100 INJECTION SUBCUTANEOUS at 19:48

## 2022-07-25 RX ADMIN — IOPAMIDOL 180 ML: 755 INJECTION, SOLUTION INTRAVENOUS at 14:49

## 2022-07-25 RX ADMIN — METHYLPREDNISOLONE SODIUM SUCCINATE 125 MG: 125 INJECTION, POWDER, FOR SOLUTION INTRAMUSCULAR; INTRAVENOUS at 19:49

## 2022-07-25 ASSESSMENT — ENCOUNTER SYMPTOMS
EYE PAIN: 0
COUGH: 0
SINUS PAIN: 0
DIARRHEA: 0
BACK PAIN: 0
CHEST TIGHTNESS: 0
ABDOMINAL PAIN: 0
EYE REDNESS: 0
NAUSEA: 0
CONSTIPATION: 0
SINUS PRESSURE: 0
SHORTNESS OF BREATH: 0
VOMITING: 0

## 2022-07-25 ASSESSMENT — PAIN SCALES - GENERAL: PAINLEVEL_OUTOF10: 0

## 2022-07-25 NOTE — VIRTUAL HEALTH
Consults  Patient Location:  [address] [place] [address] Emergency Department    Provider Location ([facility]/State): [facility]    This virtual visit was ED telephone- telestroke consult, for inpatient care please consult Neurology on call              I was contacted by the ED team  to review an acute code stroke  and review of cerebral vasculature imaging study to investigate if there is an large vessel occlusion. The patient has a NIHSS of 12- left sided weakness . Head CT is negative for ICH - Shows subacute LEFT MCA stroke. The last known well time was reported as UNCERTAIN     The cerebral vascular imaging demonstrates NO LVO. The cerebral perfusion imaging demonstrates NO  mismatch as infarct completed     VIZ LVO was utilized to assist with triage      Assessment:  1. Acute Ischemic Right MCA stroke- completed    Plan: 1. Completed infarct not a thrombolytic candidate   2. No large vessel occlusion noted. No endovascular stroke therapy needed. 3. Neurology and Hospitalist to follow closely. Has carotid stenosis- await radiology read for degree, however with recent cancer diagnosis this could be medically managed with DAPT until oncology formulates a staging and plan.  Malignancy induced hypercoagulable state is on the differential    ASA, lipitor    TIME SPENT IN MEDICAL DECISION MAKING / REVIEW OF CASE AND IMAGING / DISCUSSION OF CASE WITH PHYSICIANS INVOLVED IN  THE ACUTE CARE= 35 min

## 2022-07-25 NOTE — ED PROVIDER NOTES
[facility]  Department of Emergency Medicine     Written by: Danay Oh DO  Patient Name: Debbie Patterson  Attending Provider: Jocelyn Lee MD  Admit Date: 2022  1:54 PM  MRN: [id number]                   : 1955        Chief Complaint   Patient presents with    Cerebrovascular Accident     Son states she has been weak for the last day or two and has been leaning to one side; he states when she woke up at 1130 today she could not move her L side; he also states she just heard she has breast cancer and it is metastasizing       - Chief complaint    Ms. Ashutosh Johnson is a 78year old female who presents to the ED due to a cerebrovascular accident. Patient was just recently admitted to the hospital and diagnosed with breast cancer 10 days ago. Patient's last known well was around midnight last night. She was found at noon today with global left-sided weakness and facial droop. When EMS arrived they stated that she was saturating 56% on room air she was placed on a nonrebreather. She is alert and oriented x3 but has no movement of her left side of her body. She also has left-sided sensory deficit. Symptoms of been constant since onset. Denies any aggravating or relieving factors. Denies any recent fevers, chills, nausea, vomiting, chest pain, shortness of breath, abdominal pain, bowel or urinary changes, headaches or vision changes. Review of Systems   Constitutional:  Negative for chills, fatigue and fever. HENT:  Negative for congestion, sinus pressure and sinus pain. Eyes:  Negative for pain and redness. Respiratory:  Negative for cough, chest tightness and shortness of breath. Cardiovascular:  Negative for chest pain, palpitations and leg swelling. Gastrointestinal:  Negative for abdominal pain, constipation, diarrhea, nausea and vomiting. Genitourinary:  Negative for dysuria, flank pain, frequency, hematuria and urgency.    Musculoskeletal:  Negative for Scale/Score at time of initial evaluation:  1A: Level of Consciousness 0 - alert; keenly responsive   1B: Ask Month and Age 1 - answers one question correctly   1C: Tell Patient To Open and Close Eyes, then Hand  Squeeze 0 - performs both tasks correctly   2: Test Horizontal Extraocular Movements 0 - normal   3: Test Visual Fields 0 - no visual loss   4: Test Facial Palsy 2 - partial paralysis (total or near total paralysis of the lower face)   5A: Test Left Arm Motor Drift 4 - no movement   5B: Test Right Arm Motor Drift 0 - no drift, limb holds 90 (or 45) degrees for full 10 seconds   6A: Test Left Leg Motor Drift 4 - no movement   6B: Test Right Leg Motor Drift 0 - no drift; leg holds 30 degree position for full 5 seconds   7: Test Limb Ataxia   (FNF/Heel-Shin) 1 - present in one limb   8: Test Sensation 1 - mild to moderate sensory loss; patient feels pinprick is less sharp or is dull on the affected side; there is a loss of superficial pain with pinprick but patient is aware of being touched    9: Test Language/Aphasia 0 - no aphasia, normal   10: Test Dysarthria 0 - normal   11: Test Extinction/Inattention 0 - no abnormality   Total 13         MDM  Number of Diagnoses or Management Options  Acute respiratory failure with hypoxia and hypercapnia (HCC)  Cerebrovascular accident (CVA), unspecified mechanism ([address]  Diagnosis management comments: This is a 78year old female who presents to the ED due to a cerebrovascular accident. Daughter was originally called. Dr. Patsy Ram reviewed her imaging and stated that imaging revealed a subacute stroke recommended aspirin. Patient did not have any PE on CTA. ABG revealed hypercapnia and patient was placed on BiPAP. CBC was benign within normal limits. BMP revealed hyperkalemia with a potassium of 6.4 and an DIANN with a creatinine of 2.1. She was given light fluid hydration and hyperkalemia protocol was initiated.   Patient's initial troponin is 66 and repeat currently pending. BNP is elevated at 17,428. CK was normal.  Coags were also normal.  Patient will be admitted to the hospital by Dr. Sherrell Merlos for further work-up and treatment at this time. --------------------------------------------- PAST HISTORY ---------------------------------------------  Past Medical History:  has a past medical history of Anxiety, Arthritis, Cancer ([facility] [address] Chronic bronchitis ([facility] [address] Emphysema, and Walking pneumonia. Past Surgical History:  has a past surgical history that includes Appendectomy; US BREAST BIOPSY W LOC DEVICE 1ST LESION RIGHT (Right, 7/15/2022); and [facility] US GUIDED PLACE LOC DEVICE PERC 1ST LESION (7/15/2022). Social History:  reports that she has been smoking cigarettes. She has been smoking an average of 1 pack per day. She has never used smokeless tobacco. She reports that she does not drink alcohol and does not use drugs. Family History: family history includes Cancer in her father, mother, and sister; Shiraz Mcneal in her father. The patients home medications have been reviewed.     Allergies: Bee venom    -------------------------------------------------- RESULTS -------------------------------------------------    LABS:  Results for orders placed or performed during the hospital encounter of 62/12/21   Basic Metabolic Panel w/ Reflex to MG   Result Value Ref Range    Sodium 137 132 - 146 mmol/L    Potassium reflex Magnesium 6.4 (H) 3.5 - 5.0 mmol/L    Chloride 93 (L) 98 - 107 mmol/L    CO2 32 (H) 22 - 29 mmol/L    Anion Gap 12 7 - 16 mmol/L    Glucose 115 (H) 74 - 99 mg/dL    BUN 73 (H) 6 - 23 mg/dL    Creatinine 2.1 (H) 0.5 - 1.0 mg/dL    GFR Non-African American 24 >=60 mL/min/1.73    GFR African American 28     Calcium 8.6 8.6 - 10.2 mg/dL   CBC with Auto Differential   Result Value Ref Range    WBC 10.4 4.5 - 11.5 E9/L    RBC 4.85 3.50 - 5.50 E12/L    Hemoglobin 14.6 11.5 - 15.5 g/dL    Hematocrit 48.3 (H) 34.0 - 48.0 %    MCV 99.6 80.0 - 99.9 fL    MCH 30.1 26.0 - 35.0 pg    MCHC 30.2 (L) 32.0 - 34.5 %    RDW 15.7 (H) 11.5 - 15.0 fL    Platelets 346 469 - 284 E9/L    MPV 10.0 7.0 - 12.0 fL    Neutrophils % 71.2 43.0 - 80.0 %    Immature Granulocytes % 0.4 0.0 - 5.0 %    Lymphocytes % 14.3 (L) 20.0 - 42.0 %    Monocytes % 13.7 (H) 2.0 - 12.0 %    Eosinophils % 0.1 0.0 - 6.0 %    Basophils % 0.3 0.0 - 2.0 %    Neutrophils Absolute 7.41 (H) 1.80 - 7.30 E9/L    Immature Granulocytes # 0.04 E9/L    Lymphocytes Absolute 1.49 (L) 1.50 - 4.00 E9/L    Monocytes Absolute 1.42 (H) 0.10 - 0.95 E9/L    Eosinophils Absolute 0.01 (L) 0.05 - 0.50 E9/L    Basophils Absolute 0.03 0.00 - 0.20 E9/L   Troponin   Result Value Ref Range    Troponin, High Sensitivity 66 (H) 0 - 9 ng/L   Brain Natriuretic Peptide   Result Value Ref Range    Pro-BNP 17,428 (H) 0 - 125 pg/mL   Protime-INR   Result Value Ref Range    Protime 22.5 (H) 9.3 - 12.4 sec    INR 2.0    APTT   Result Value Ref Range    aPTT 34.7 24.5 - 35.1 sec   CK   Result Value Ref Range    Total CK 31 20 - 180 U/L   Serum Drug Screen   Result Value Ref Range    Ethanol Lvl <10 mg/dL    Acetaminophen Level <5.0 (L) 10.0 - 22.4 mcg/mL    Salicylate, Serum <2.0 0.0 - 30.0 mg/dL    TCA Scrn NEGATIVE Cutoff:300 ng/mL   Blood Gas, Arterial   Result Value Ref Range    Date Analyzed [id number]     Time Analyzed 1508     Source: Blood Arterial     pH, Blood Gas 7.233 (L) 7.350 - 7.450    PCO2 76.7 (HH) 35.0 - 45.0 mmHg    PO2 202.3 (H) 75.0 - 100.0 mmHg    HCO3 31.6 (H) 22.0 - 26.0 mmol/L    B.E. 1.6 -3.0 - 3.0 mmol/L    O2 Sat 99.3 (H) 92.0 - 98.5 %    O2Hb 97.0 94.0 - 97.0 %    COHb 1.9 (H) 0.0 - 1.5 %    MetHb 0.4 0.0 - 1.5 %    O2 Content 20.7 mL/dL    HHb 0.7 0.0 - 5.0 %    tHb (est) 14.9 11.5 - 16.5 g/dL    Mode NRB 15L     Date Of Collection      Time Collected      Pt Temp 37.0 C     ID [id number]     Lab [id number]     Critical(s) Notified Handed report to Dr/RN    POCT Glucose   Result Value Ref Range    Meter Glucose artery. 4. No significant stenosis in the right vertebral artery. RECOMMENDATIONS:   Unavailable         CT BRAIN PERFUSION   Final Result   CT PERFUSION OF THE HEAD:      1. Area of ischemia is seen in the right parietal lobe, corresponding to the   site of edema identified on the CT of the head. CTA OF THE HEAD:      1. No major arterial stenosis is seen in the brain. CTA OF THE NECK:      1. Approximately 70% stenosis in the proximal right ICA. 2. Less than 50% stenosis in the proximal left ICA. 3. Approximately 50% stenosis of the origin of the left vertebral artery. 4. No significant stenosis in the right vertebral artery. RECOMMENDATIONS:   Unavailable         CT ABDOMEN PELVIS WO CONTRAST Additional Contrast? None    (Results Pending)       EKG: This EKG is signed and interpreted by me. Rate: 59  Rhythm: Sinus  Interpretation: incomplete right bundle branch block  Comparison: changes compared to previous EKG      ------------------------- NURSING NOTES AND VITALS REVIEWED ---------------------------  Date / Time Roomed:  7/25/2022  1:54 PM  ED Bed Assignment:  06/06    The nursing notes within the ED encounter and vital signs as below have been reviewed. Patient Vitals for the past 24 hrs:   BP Pulse Resp SpO2 Weight   07/25/22 1519 -- -- 14 -- --   07/25/22 1441 (!) 103/52 60 12 97 % 115 lb (52.2 kg)       Oxygen Saturation Interpretation: Normal    ------------------------------------------ PROGRESS NOTES ------------------------------------------  Re-evaluation(s):  Time: 1600  Patients symptoms show no change  Repeat physical examination is not changed    Counseling:  I have spoken with the patient and discussed todays results, in addition to providing specific details for the plan of care and counseling regarding the diagnosis and prognosis.   Their questions are answered at this time and they are agreeable with the plan of admission.    --------------------------------- ADDITIONAL PROVIDER NOTES ---------------------------------  Consultations:  Time: 5398. Spoke with Dr. Jung Christianson. Discussed case. They will admit the patient. This patient's ED course included: a personal history and physicial examination, re-evaluation prior to disposition, multiple bedside re-evaluations, IV medications, cardiac monitoring, continuous pulse oximetry, and complex medical decision making and emergency management    This patient has remained hemodynamically stable during their ED course. Diagnosis:  1. Cerebrovascular accident (CVA), unspecified mechanism ([facility]    2. Acute respiratory failure with hypoxia and hypercapnia (HCC)    3. Hyperkalemia    4. Acute kidney injury ([facility] [address]        Disposition:  Patient's disposition: Admit to telemetry  Patient's condition is stable. Patient was seen and evaluated by myself and my attending Gisella Prather MD. Assessment and Plan discussed with attending provider, please see attestation for final plan of care.      DO Joellen Pulido DO  Resident  07/25/22 9075

## 2022-07-25 NOTE — TELEPHONE ENCOUNTER
Writer contacted STEFAN Alejandro to inform of 30 day readmission risk. Writer's attempt to contact STEFAN Alejandro was unsuccessful.      Call Back: If you need to call back to inform of disposition you can contact me at [phone]

## 2022-07-25 NOTE — H&P
Hospitalist History & Physical      PCP: No primary care provider on file. Date of Admission: 7/25/2022    Date of Service: Pt seen/examined on 7/25/2022 and is admitted to Inpatient with expected LOS greater than two midnights due to medical therapy. Chief Complaint:  had concerns including Cerebrovascular Accident (Son states she has been weak for the last day or two and has been leaning to one side; he states when she woke up at 1130 today she could not move her L side; he also states she just heard she has breast cancer and it is metastasizing ). History Of Present Illness: 72-year-old female patient from home with medical history of severe pulmonary hypertension, tobacco use, right breast cancer, COPD, MDD, hypertension who was brought to the ED with concern for stroke. Patient last known normal well was around midnight last night. Family found her today around noon with left-sided weakness and facial droop. Upon EMS arrival at the scene patient had saturation of 56% on room air and placed on nonrebreather mask. Patient was recently diagnosed with breast cancer 10 days ago for which she was discharged from this facility. No report of fever, cough, CP, abdominal pain, n/v.   Stroke alert was activated on arrival to ED. CT scanning of the head showed subacute infarction described as area of ischemia in the right parietal and left occipital lobes. No hemorrhagic, significant mild static, or midline shift. At CTA of the head and neck obtained with no major intracranial arterial stenosis but 70% stenosis of the right proximal ICA. Telestroke was consulted and patient was out of the window for tPA given completed infarct, also per neuro recommendation patient with likely hypercoagulable state this ICA daily managed medically, with DAPT.   Patient was given aspirin on the ED  Remaining ED work-up showing normal white blood cells, hemoglobin, elevated hematocrit, potassium 6.4, bicarb 32, BUN 73, creatinine 2.1, ABG showing pH 7.23, PCO2 76.7, PO2 202, troponin 66, and proBNP 17,428. EKG sinus bradycardia 59, normal QT, no ST segment changes. CTA pulmonary with contrast with no evidence of PE. Redemonstration of right breast mass with right axillary lymphadenopathy and multiple enlarged mediastinal lymph nodes  Patient will be admitted for further management of subacute stroke, acute hypercapnic and hypoxic respiratory failure        Past Medical History:   Diagnosis Date    Anxiety     Arthritis     Cancer ([facility] [address]     Chronic bronchitis ([facility] [address]     Emphysema     according to xray report 2013    Walking pneumonia        Past Surgical History:   Procedure Laterality Date    APPENDECTOMY      INDER US PERQ DEVICE SOFT TISSUE PLMT  FIRST LESION  7/15/2022    INDER US PERQ DEVICE SOFT TISSUE PLMT  FIRST LESION 7/15/2022 [facility]    US BREAST NEEDLE BIOPSY RIGHT Right 7/15/2022    US BREAST NEEDLE BIOPSY RIGHT 7/15/2022 [facility]       Prior to Admission medications    Medication Sig Start Date End Date Taking? Authorizing Provider   carvedilol (COREG) 6.25 MG tablet Take 1 tablet by mouth in the morning and 1 tablet in the evening. Take with meals. 7/16/22   Sahra Smith MD   furosemide (LASIX) 40 MG tablet Take 1 tablet by mouth in the morning. 7/17/22 9/15/22  Sahra Smith MD   potassium chloride (KLOR-CON M) 20 MEQ extended release tablet Take 2 tablets by mouth in the morning. 7/16/22 9/14/22  Sahra Smith MD   ibuprofen (ADVIL;MOTRIN) 200 MG tablet Take 400 mg by mouth 2 times daily as needed for Pain  7/16/22  Historical Provider, MD         Allergies:  Bee venom    Social History:    TOBACCO:   reports that she has been smoking cigarettes. She has been smoking an average of 1 pack per day. She has never used smokeless tobacco.  ETOH:   reports no history of alcohol use. Family History:    Reviewed in detail and negative for DM, CAD, Cancer, CVA.  Positive as follows\"      Problem Relation Age of Onset    Cancer Mother         brain    Cancer Father         lung    Lung Cancer Father     Cancer Sister         cervical       REVIEW OF SYSTEMS:   Pertinent positives as noted in the HPI. All other systems reviewed and negative. PHYSICAL EXAM:  BP (!) 103/52   Pulse 60   Resp 14   Wt 115 lb (52.2 kg)   SpO2 97%   BMI 18.01 kg/m²   General appearance: No apparent distress, appears stated age and cooperative. HEENT: Normal cephalic, atraumatic without obvious deformity. Pupils equal, round, and reactive to light. Extra ocular muscles intact. Conjunctivae/corneas clear. Neck: Supple, with full range of motion. No jugular venous distention. Trachea midline. Respiratory: Creased respiratory effort, on BiPAP, expiratory wheezing, no crackles  Cardiovascular: Bradycardic, no murmur gallops or rubs  Abdomen: Nondistended, normal BS, nontender, no visceromegaly  Musculoskeletal: No clubbing, cyanosis, right foot erythema and edema. Brisk capillary refill. Skin: Normal skin color. No rashes or lesions. Neurologic:  Neurovascularly intact without any focal sensory/motor deficits. Cranial nerves: II-XII intact, grossly non-focal.  Left-sided hemiparesis and left-sided lower facial droop  Psychiatric: Alert and oriented, thought content appropriate, normal insight    Reviewed EKG and CXR personally      CBC:   Recent Labs     07/25/22  1441   WBC 10.4   RBC 4.85   HGB 14.6   HCT 48.3*   MCV 99.6   RDW 15.7*        BMP:   Recent Labs     07/25/22  1441      K 6.4*   CL 93*   CO2 32*   BUN 73*   CREATININE 2.1*     LFT:  No results for input(s): PROT, ALB, ALKPHOS, ALT, AST, BILITOT, AMYLASE, LIPASE in the last 72 hours. CE:  Recent Labs     07/25/22  1441   CKTOTAL 31     PT/INR:   Recent Labs     07/25/22  1405   INR 2.0   APTT 34.7     BNP: No results for input(s): BNP in the last 72 hours.   ESR: No results found for: SEDRATE  CRP: No results found for: CRP  D Dimer: No results found for: DDIMER   Folate and B12: No results found for: [id number], No results found for: FOLATE  Lactic Acid: No results found for: LACTA  Thyroid Studies:   Lab Results   Component Value Date    TSH 2.510 07/14/2022       Oupatient labs:  Lab Results   Component Value Date    CHOL 155 07/14/2022    TRIG 89 07/14/2022    HDL 48 07/14/2022    LDLCALC 89 07/14/2022    TSH 2.510 07/14/2022    INR 2.0 07/25/2022       Urinalysis:  No results found for: NITRU, WBCUA, BACTERIA, RBCUA, BLOODU, SPECGRAV, GLUCOSEU    Imaging:  Echo Complete    Result Date: 7/13/2022  Transthoracic Echocardiography Report (TTE)  Demographics   Patient Name        Holli Ruiz Gender                Female   Medical Record      [id number]     Room Number           21  Number   Account #           [de-identified]    Procedure Date        07/13/2022   Corporate ID                     Ordering Physician    Nahomy Canseco MD   Accession Number    [id number]   Referring Physician   Date of Birth       1955   Sonographer           Brisa Joya [facility]   Age                 78 year(s)   Interpreting          Nahomy Canseco MD                                   Physician                                    Any Other  Procedure Type of Study   TTE procedure:Echo Complete W/Doppler & Color Flow. Procedure Date Date: 07/13/2022 Start: 03:50 PM Study Location: Portable Technical Quality: Adequate visualization Indications:Preop cardiac evaluation. Patient Status: Routine Height: 67 inches Weight: 115 pounds BSA: 1.6 m^2 BMI: 18.01 kg/m^2 HR: 83 bpm BP: 172/93 mmHg  Findings   Left Ventricle  Left ventricle size is normal.  Normal left ventricular wall thickness. Ejection fraction is visually estimated at 50%. Indeterminate diastolic function. Right Ventricle  Dilated right ventricle and reduced right ventricular function (TAPSE 1.0  cm). Left Atrium  Normal sized left atrium by volume index. Right Atrium  Severely dilated right atrium.    Mitral Valve  Mild mitral mmHgLVOT Mean Gradient: 1.1  MV E/A Ratio: 1.26   AV Mean Velocity: 0.83 mmHg  MV Peak Gradient:    m/s                    Estimated RVSP: 60.6 mmHg  4.2 mmHg             AV Mean Gradient: 3.3  Estimated RAP:3 mmHg  MV Mean Gradient:    mmHg  1.8 mmHg             AV VTI: 22.2 cm  MV Mean Velocity:    AV Area                TR Velocity:3.8 m/s  0.62 m/s             (Continuity):1.93 cm^2 TR Gradient:57.64 mmHg  MV Deceleration      AV Deceleration Time:  PV Peak Velocity: 0.75 m/s  Time: 112.8 msec     1460 msec              PV Peak Gradient: 2.24 mmHg  MV P1/2t: 40.8 msec  LVOT VTI: 15.1 cm      PV Mean Velocity: 0.55 m/s  MVA by PHT:5.39 cm^2                        PV Mean Gradient: 1.3 mmHg  MV Area              Estimated PASP: 60.64  (continuity): 2.4    mmHg                   OR ED Velocity: 1.65 m/s  cm^2  MV E' Septal  Velocity: 0.09 m/s  MV E' Lateral  Velocity: 10 m/s  [email or web address] [id number]    CT Head WO Contrast    Result Date: 7/25/2022  EXAMINATION: CT OF THE HEAD WITHOUT CONTRAST  7/25/2022 2:12 pm TECHNIQUE: CT of the head was performed without the administration of intravenous contrast. Automated exposure control, iterative reconstruction, and/or weight based adjustment of the mA/kV was utilized to reduce the radiation dose to as low as reasonably achievable. COMPARISON: None. HISTORY: ORDERING SYSTEM PROVIDED HISTORY: matthew TECHNOLOGIST PROVIDED HISTORY: Reason for exam:->matthew Has a \"code stroke\" or \"stroke alert\" been called? ->Yes Decision Support Exception - unselect if not a suspected or confirmed emergency medical condition->Emergency Medical Condition (MA) FINDINGS: BRAIN/VENTRICLES: Hypodensity likely representing cytotoxic edema from a subacute infarction is seen in the right parietal lobe. Small hypodensity in the left occipital lobe may also represent a subacute infarction.   No hemorrhage, significant mass effect or midline shift is seen. No hydrocephalus or extra-axial fluid is seen. ORBITS: The visualized portion of the orbits demonstrate no acute abnormality. SINUSES: The visualized paranasal sinuses and mastoid air cells demonstrate no acute abnormality. SOFT TISSUES/SKULL:  No acute abnormality of the visualized skull or soft tissues. 1. LIKELY SUBACUTE INFARCTIONS in the right parietal and left occipital lobes. 2. No hemorrhage, significant mass effect or midline shift. CT CHEST W CONTRAST    Result Date: 7/15/2022  EXAMINATION: CT OF THE ABDOMEN AND PELVIS WITH CONTRAST; CT OF THE CHEST WITH CONTRAST 7/15/2022 3:43 pm TECHNIQUE: CT of the abdomen and pelvis was performed with the administration of intravenous contrast. Multiplanar reformatted images are provided for review. Automated exposure control, iterative reconstruction, and/or weight based adjustment of the mA/kV was utilized to reduce the radiation dose to as low as reasonably achievable.; CT of the chest was performed with the administration of intravenous contrast. Multiplanar reformatted images are provided for review. Automated exposure control, iterative reconstruction, and/or weight based adjustment of the mA/kV was utilized to reduce the radiation dose to as low as reasonably achievable. COMPARISON: CTA chest 07/13/2022 HISTORY: ORDERING SYSTEM PROVIDED HISTORY: breast cancer staging TECHNOLOGIST PROVIDED HISTORY: Additional Contrast?->Oral Reason for exam:->breast cancer staging What reading provider will be dictating this exam?->CRC FINDINGS: CHEST: Oncologic: Right breast mass again noted. Right axillary lymphadenopathy again noted. No pathologically enlarged the mediastinal or hilar lymph nodes. A 9 mm right lower paratracheal (series 2, image 71) lymph node is prominent but nonspecific. No pulmonary mass or suspicious pulmonary nodules. No lytic or blastic osseous metastases are seen.  Non oncologic: Unremarkable thyroid. Atherosclerotic disease including coronary disease. No thoracic aortic aneurysm or dissection. No pulmonary embolism. Cardiomegaly. No pericardial effusion. Unremarkable esophagus. Trace right pleural effusion. Mild pleural thickening in the left lower lobe periphery, with associated mild round atelectasis. There are areas of scarring and/or linear atelectasis in both lungs. Mild bronchial wall thickening in the bilateral lower lobes. Scarring in the bilateral lung apices. Pulmonary emphysema. Degenerative changes of the spine. Right chest wall edema. Unusual appearance of the cortex and endosteal portion of the visualized left humerus (series 2, image 19). ABDOMEN/PELVIS: Oncologic: No solid organ metastasis. No lymphadenopathy. No lytic or blastic osseous metastases are seen. Non oncologic: Cholelithiasis. Unremarkable appearance of the liver, spleen, pancreas, and kidneys. Bilateral adrenal gland thickening. No free air. Mild perihepatic, bilateral pericolic gutter, and pelvic free fluid. No bowel obstruction. Status post appendectomy per history. Unremarkable bladder. Prominent bilateral pelvic veins, which are nonspecific but can be seen in the setting pelvic congestion syndrome. No abdominal aortic aneurysm. Atherosclerotic disease. Degenerative changes and mild scoliosis of the spine. Mild degenerative changes of the hips. Anasarca, greater in the right abdominal wall. CHEST: Oncologic: Right breast mass and right axillary lymphadenopathy again noted. A right paratracheal 9 mm short axis lymph node is nonspecific. Otherwise, no evidence of metastatic disease in the chest. Non oncologic: Right chest wall edema. Pulmonary emphysema. Mild bronchitis bilaterally. Pulmonary scarring bilaterally. Trace pleural effusions. Cardiomegaly. Atherosclerotic disease.  Unusual thickened appearance of the cortex in the visualized left humerus; recommend x-rays of the left humerus for further evaluation. ABDOMEN/PELVIS: Oncologic: No evidence of metastatic disease in the abdomen/pelvis. Non oncologic: Right abdominal wall edema. Cholelithiasis. Small amount of generalized free fluid. No free air. CT ABDOMEN PELVIS W IV CONTRAST Additional Contrast? Oral    Result Date: 7/15/2022  EXAMINATION: CT OF THE ABDOMEN AND PELVIS WITH CONTRAST; CT OF THE CHEST WITH CONTRAST 7/15/2022 3:43 pm TECHNIQUE: CT of the abdomen and pelvis was performed with the administration of intravenous contrast. Multiplanar reformatted images are provided for review. Automated exposure control, iterative reconstruction, and/or weight based adjustment of the mA/kV was utilized to reduce the radiation dose to as low as reasonably achievable.; CT of the chest was performed with the administration of intravenous contrast. Multiplanar reformatted images are provided for review. Automated exposure control, iterative reconstruction, and/or weight based adjustment of the mA/kV was utilized to reduce the radiation dose to as low as reasonably achievable. COMPARISON: CTA chest 07/13/2022 HISTORY: ORDERING SYSTEM PROVIDED HISTORY: breast cancer staging TECHNOLOGIST PROVIDED HISTORY: Additional Contrast?->Oral Reason for exam:->breast cancer staging What reading provider will be dictating this exam?->CRC FINDINGS: CHEST: Oncologic: Right breast mass again noted. Right axillary lymphadenopathy again noted. No pathologically enlarged the mediastinal or hilar lymph nodes. A 9 mm right lower paratracheal (series 2, image 71) lymph node is prominent but nonspecific. No pulmonary mass or suspicious pulmonary nodules. No lytic or blastic osseous metastases are seen. Non oncologic: Unremarkable thyroid. Atherosclerotic disease including coronary disease. No thoracic aortic aneurysm or dissection. No pulmonary embolism. Cardiomegaly. No pericardial effusion. Unremarkable esophagus. Trace right pleural effusion.   Mild pleural thickening in the left lower lobe periphery, with associated mild round atelectasis. There are areas of scarring and/or linear atelectasis in both lungs. Mild bronchial wall thickening in the bilateral lower lobes. Scarring in the bilateral lung apices. Pulmonary emphysema. Degenerative changes of the spine. Right chest wall edema. Unusual appearance of the cortex and endosteal portion of the visualized left humerus (series 2, image 19). ABDOMEN/PELVIS: Oncologic: No solid organ metastasis. No lymphadenopathy. No lytic or blastic osseous metastases are seen. Non oncologic: Cholelithiasis. Unremarkable appearance of the liver, spleen, pancreas, and kidneys. Bilateral adrenal gland thickening. No free air. Mild perihepatic, bilateral pericolic gutter, and pelvic free fluid. No bowel obstruction. Status post appendectomy per history. Unremarkable bladder. Prominent bilateral pelvic veins, which are nonspecific but can be seen in the setting pelvic congestion syndrome. No abdominal aortic aneurysm. Atherosclerotic disease. Degenerative changes and mild scoliosis of the spine. Mild degenerative changes of the hips. Anasarca, greater in the right abdominal wall. CHEST: Oncologic: Right breast mass and right axillary lymphadenopathy again noted. A right paratracheal 9 mm short axis lymph node is nonspecific. Otherwise, no evidence of metastatic disease in the chest. Non oncologic: Right chest wall edema. Pulmonary emphysema. Mild bronchitis bilaterally. Pulmonary scarring bilaterally. Trace pleural effusions. Cardiomegaly. Atherosclerotic disease. Unusual thickened appearance of the cortex in the visualized left humerus; recommend x-rays of the left humerus for further evaluation. ABDOMEN/PELVIS: Oncologic: No evidence of metastatic disease in the abdomen/pelvis. Non oncologic: Right abdominal wall edema. Cholelithiasis. Small amount of generalized free fluid. No free air.      CTA NECK W CONTRAST    Result Date: 7/25/2022  EXAMINATION: CTA OF THE HEAD WITH CONTRAST WITH PERFUSION; CTA OF THE NECK; CTA OF THE HEAD WITH CONTRAST 7/25/2022 2:12 pm: TECHNIQUE: CTA of the head/brain was performed with the administration of intravenous contrast. Multiplanar reformatted images are provided for review. MIP images are provided for review. Automated exposure control, iterative reconstruction, and/or weight based adjustment of the mA/kV was utilized to reduce the radiation dose to as low as reasonably achievable.; CTA of the neck was performed with the administration of intravenous contrast. Multiplanar reformatted images are provided for review. MIP images are provided for review. Stenosis of the internal carotid arteries measured using NASCET criteria. Automated exposure control, iterative reconstruction, and/or weight based adjustment of the mA/kV was utilized to reduce the radiation dose to as low as reasonably achievable. COMPARISON: None. HISTORY: ORDERING SYSTEM PROVIDED HISTORY: matthew TECHNOLOGIST PROVIDED HISTORY: Reason for exam:->matthew Has a \"code stroke\" or \"stroke alert\" been called? ->Yes Decision Support Exception - unselect if not a suspected or confirmed emergency medical condition->Emergency Medical Condition (MA) FINDINGS: CTA NECK: AORTIC ARCH/ARCH VESSELS: No dissection or arterial injury. No significant stenosis of the brachiocephalic or subclavian arteries. CAROTID ARTERIES: Prominent atherosclerotic in the proximal right ICA results in approximately 70% maximal stenosis. Mild calcified atherosclerosis in the left carotid bulb and proximal ICA results in less than 50% stenosis. VERTEBRAL ARTERIES: Approximately 50% stenotic plaque is seen at the origin of the left vertebral artery (coronal reformats, image series 614, image 51). The remainder of the left vertebral artery is unremarkable. The right vertebral artery is unremarkable. The SOFT TISSUES: The lung apices are clear.   No cervical or superior mediastinal lymphadenopathy. The larynx and pharynx are unremarkable. No acute abnormality of the salivary and thyroid glands. BONES: No acute osseous abnormality. CTA HEAD: ANTERIOR CIRCULATION: No significant stenosis of the intracranial internal carotid, anterior cerebral, or middle cerebral arteries. No aneurysm. POSTERIOR CIRCULATION: No significant stenosis of the vertebral, basilar, or posterior cerebral arteries. No aneurysm. OTHER: No dural venous sinus thrombosis on this non-dedicated study. BRAIN: No mass effect or midline shift. No extra-axial fluid collection. The gray-white differentiation is maintained. CT PERFUSION: EXAM QUALITY: The examination is diagnostic with appropriate arterial inflow and venous outflow curves, and diagnostic perfusion maps. CORE INFARCT: The total area of ischemic core is 0 mL (CBF<30% volume). TOTAL HYPOPERFUSION: The total area of hypoperfusion is 0 mL (Tmax>6s volume). On the color images, there is suggestion of mild ischemia in the right parietal lobe, with T-max greater than 4 seconds, corresponding to the site of edema identified in the right parietal lobe. PENUMBRA: The penumbra (mismatch) volume is 0 mL and is located in the n/a. The mismatch ratio is n/a.     CT PERFUSION OF THE HEAD: 1. Area of ischemia is seen in the right parietal lobe, corresponding to the site of edema identified on the CT of the head. CTA OF THE HEAD: 1. No major arterial stenosis is seen in the brain. CTA OF THE NECK: 1. Approximately 70% stenosis in the proximal right ICA. 2. Less than 50% stenosis in the proximal left ICA. 3. Approximately 50% stenosis of the origin of the left vertebral artery. 4. No significant stenosis in the right vertebral artery.  RECOMMENDATIONS: Unavailable     CTA PULMONARY W CONTRAST    Result Date: 7/25/2022  EXAMINATION: CTA OF THE CHEST 7/25/2022 2:47 pm TECHNIQUE: CTA of the chest was performed after the administration of intravenous contrast.  Multiplanar reformatted images are provided for review. MIP images are provided for review. Automated exposure control, iterative reconstruction, and/or weight based adjustment of the mA/kV was utilized to reduce the radiation dose to as low as reasonably achievable. COMPARISON: None. HISTORY: ORDERING SYSTEM PROVIDED HISTORY: hypoxic TECHNOLOGIST PROVIDED HISTORY: Reason for exam:->hypoxic Decision Support Exception - unselect if not a suspected or confirmed emergency medical condition->Emergency Medical Condition (MA) What reading provider will be dictating this exam?->CRC FINDINGS: No filling defect in the pulmonary arteries to suggest pulmonary arterial embolism. The heart is moderately enlarged. No pericardial effusion. Redemonstration of enlarged lower paratracheal lymph node measuring approximately 13 x 12 mm. Redemonstration of prominent lymph nodes adjacent to left margin of ascending thoracic aorta and at level of AP window. No airspace opacity or pleural effusion. Peribronchial thickening bilaterally. Few areas of bronchial stenosis involving right and left lower lobes. Redemonstration of atelectatic changes in left lung base with volume loss. Scarring again demonstrated at level of lung apices. View of the upper abdomen shows reflux of contrast material into the hepatic veins. Redemonstration of mass associated with the right breast.  Redemonstration of enlarged right axillary lymph nodes. 1. Redemonstration of right breast mass with right axillary lymphadenopathy. 2. No evidence of pulmonary embolism. 3. Redemonstration of multiple enlarged mediastinal lymph nodes notable in lower paratracheal location. 4. Moderate cardiomegaly with findings suggesting right heart failure. 5. Stable atelectatic changes in left lung base. 6. Peribronchial thickening bilaterally suggesting inflammation with sites of bronchial stenosis notable in posterior segments of the lower lobes.      CTA PULMONARY W CONTRAST    Result Date: 7/13/2022  EXAMINATION: CTA OF THE CHEST 7/13/2022 11:52 am TECHNIQUE: CTA of the chest was performed after the administration of intravenous contrast.  Multiplanar reformatted images are provided for review. MIP images are provided for review. Automated exposure control, iterative reconstruction, and/or weight based adjustment of the mA/kV was utilized to reduce the radiation dose to as low as reasonably achievable. COMPARISON: None. HISTORY: ORDERING SYSTEM PROVIDED HISTORY: Right breast mass, swelling of b/l lower extremities, swelling of R UE, elevated troponin & BNP, hx of smoking TECHNOLOGIST PROVIDED HISTORY: Reason for exam:->Right breast mass, swelling of b/l lower extremities, swelling of R UE, elevated troponin & BNP, hx of smoking Decision Support Exception - unselect if not a suspected or confirmed emergency medical condition->Emergency Medical Condition (MA) What reading provider will be dictating this exam?->[facility] FINDINGS: Pulmonary Arteries: Pulmonary arteries are adequately opacified for evaluation. No evidence of intraluminal filling defect to suggest pulmonary embolism. Main pulmonary artery is normal in caliber. Mediastinum: No evidence of mediastinal lymphadenopathy. The heart is enlarged. There is significant right atrial enlargement. Lungs/pleura: There are emphysematous changes. There is no pulmonary infiltrate, mass or suspicious pulmonary nodule. Significant biapical pleuroparenchymal scarring is noted. There is pleuroparenchymal scarring and atelectasis seen within the left lower lobe. There is no right or left pleural effusion. There are no findings of paul pulmonary edema. Upper Abdomen: Limited images of the upper abdomen are unremarkable. Soft Tissues/Bones: There is a irregular soft tissue mass seen within the right breast which is irregular. The right breast mass measures approximately 6 cm x 4 cm by 5.4 cm.   There is right lateral chest wall edema. There is no adjacent bony erosion. There is skin thickening of the right breast.     1. There is no pulmonary embolus. 2. Irregular soft tissue mass within the right breast measuring 6 cm x 4 cm x 5.4 cm. There is adjacent skin thickening as well as soft tissue edema within the right lateral chest wall. There are enlarged lymph nodes within the right axilla. This finding is highly suggestive of breast malignancy. 3. There is no appreciable adjacent metastatic disease to the right ribs. 4. Emphysematous changes. There is no pulmonary infiltrate 5. Pleuroparenchymal scarring and atelectasis seen within the left lung base. 6. Cardiomegaly with significant right atrial enlargement. US DUP UPPER EXTREMITY RIGHT VENOUS    Result Date: 2022  Patient MRN:  [id number] : 1955 Age: 78 years Gender: Female Order Date:  2022 6:32 PM EXAM: US DUP UPPER EXTREMITY RIGHT VENOUS NUMBER OF IMAGES:  20 INDICATION:  pain, swelling pain, swelling What reading provider will be dictating this exam?->[facility] COMPARISON: None The right cephalic vein is not seen Within the visualized vessels, there is no evidence for deep venous thrombosis There is good compressibility, there is good augmentation, there is good color flow. Within the visualized vessels there is no evidence for deep venous thrombosis     CT BRAIN PERFUSION    Result Date: 2022  EXAMINATION: CTA OF THE HEAD WITH CONTRAST WITH PERFUSION; CTA OF THE NECK; CTA OF THE HEAD WITH CONTRAST 2022 2:12 pm: TECHNIQUE: CTA of the head/brain was performed with the administration of intravenous contrast. Multiplanar reformatted images are provided for review. MIP images are provided for review.  Automated exposure control, iterative reconstruction, and/or weight based adjustment of the mA/kV was utilized to reduce the radiation dose to as low as reasonably achievable.; CTA of the neck was performed with the administration of intravenous contrast. Multiplanar reformatted images are provided for review. MIP images are provided for review. Stenosis of the internal carotid arteries measured using NASCET criteria. Automated exposure control, iterative reconstruction, and/or weight based adjustment of the mA/kV was utilized to reduce the radiation dose to as low as reasonably achievable. COMPARISON: None. HISTORY: ORDERING SYSTEM PROVIDED HISTORY: matthew TECHNOLOGIST PROVIDED HISTORY: Reason for exam:->matthew Has a \"code stroke\" or \"stroke alert\" been called? ->Yes Decision Support Exception - unselect if not a suspected or confirmed emergency medical condition->Emergency Medical Condition (MA) FINDINGS: CTA NECK: AORTIC ARCH/ARCH VESSELS: No dissection or arterial injury. No significant stenosis of the brachiocephalic or subclavian arteries. CAROTID ARTERIES: Prominent atherosclerotic in the proximal right ICA results in approximately 70% maximal stenosis. Mild calcified atherosclerosis in the left carotid bulb and proximal ICA results in less than 50% stenosis. VERTEBRAL ARTERIES: Approximately 50% stenotic plaque is seen at the origin of the left vertebral artery (coronal reformats, image series 614, image 51). The remainder of the left vertebral artery is unremarkable. The right vertebral artery is unremarkable. The SOFT TISSUES: The lung apices are clear. No cervical or superior mediastinal lymphadenopathy. The larynx and pharynx are unremarkable. No acute abnormality of the salivary and thyroid glands. BONES: No acute osseous abnormality. CTA HEAD: ANTERIOR CIRCULATION: No significant stenosis of the intracranial internal carotid, anterior cerebral, or middle cerebral arteries. No aneurysm. POSTERIOR CIRCULATION: No significant stenosis of the vertebral, basilar, or posterior cerebral arteries. No aneurysm. OTHER: No dural venous sinus thrombosis on this non-dedicated study. BRAIN: No mass effect or midline shift.  No extra-axial fluid collection. The gray-white differentiation is maintained. CT PERFUSION: EXAM QUALITY: The examination is diagnostic with appropriate arterial inflow and venous outflow curves, and diagnostic perfusion maps. CORE INFARCT: The total area of ischemic core is 0 mL (CBF<30% volume). TOTAL HYPOPERFUSION: The total area of hypoperfusion is 0 mL (Tmax>6s volume). On the color images, there is suggestion of mild ischemia in the right parietal lobe, with T-max greater than 4 seconds, corresponding to the site of edema identified in the right parietal lobe. PENUMBRA: The penumbra (mismatch) volume is 0 mL and is located in the n/a. The mismatch ratio is n/a.     CT PERFUSION OF THE HEAD: 1. Area of ischemia is seen in the right parietal lobe, corresponding to the site of edema identified on the CT of the head. CTA OF THE HEAD: 1. No major arterial stenosis is seen in the brain. CTA OF THE NECK: 1. Approximately 70% stenosis in the proximal right ICA. 2. Less than 50% stenosis in the proximal left ICA. 3. Approximately 50% stenosis of the origin of the left vertebral artery. 4. No significant stenosis in the right vertebral artery. RECOMMENDATIONS: Unavailable     NM BONE SCAN WHOLE BODY    Result Date: 2022  Patient MRN: [id number] : 1955 Age:  78 years Gender: Female Order Date: 2022 9:37 AM Exam: NM BONE SCAN WHOLE BODY Number of Images: 2 views Indication:   breast cancer breast cancer What reading provider will be dictating this exam?->[facility] Comparison: CT chest 7/15/2022 Findings: Bone scan was performed following the injection of 20 mCi of MDP. Tracer uptake is seen compatible with degenerative changes. Tracer uptake is seen in both involving both knees and to lesser extent the shoulders and wrists consistent with degenerative changes. Similar findings are seen involving the left foot. Tracer uptake is seen in the right anterior ribs.  There appear to be 4 sequential regions of tracer uptake in the right anterior ribs beginning with the third and involving the fourth fifth and sixth ribs. When compared with the previous CT scan of the chest there are blastic lesions in this location with associated fractures. Pathologic fracture could give this appearance. This could be posttraumatic. Metastatic disease is not excluded. The remaining biodistribution of radiotracer appears to be within normal limits     Findings compatible with degenerative changes , tracer uptake in the right anterior ribs which within comparing with the CT of the chest is concerning for pathologic fracture. US BREAST LIMITED LEFT    Result Date: 7/15/2022  EXAMINATION: DIAGNOSTIC DIGITAL BILATERAL BREASTS MAMMOGRAM WITH TOMOSYNTHESIS; TARGETED ULTRASOUND OF THE RIGHT BREAST; TARGETED ULTRASOUND OF THE LEFT BREAST, 7/15/2022 10:35 am TECHNIQUE: Diagnostic mammography of the bilateral breasts was performed with tomosynthesis. 2D standard and 3D tomosynthesis combination imaging performed through both breasts. Computer aided detection was utilized in the interpretation of this exam.; Targeted ultrasound of the right breast was performed.; Target ultrasound of the left breast was performed. Views: Right cc and bilateral MLO views with tomosynthesis COMPARISON: Multiple prior studies, the most recent dated July 13, 2022 HISTORY: ORDERING SYSTEM PROVIDED HISTORY: Breast mass, right TECHNOLOGIST PROVIDED HISTORY: [address] Patient in 0B Reason for exam:->Irregular soft tissue mass within the right breast measuring 6 cm x 4 cm x FINDINGS: Bilateral diagnostic mammogram: Exam is suboptimal due to lack of left CC view. The right breast is smaller than the left. There is diffuse subcutaneous edema and skin thickening on the right. There are bilateral focal asymmetries. Bilateral diagnostic ultrasound: Targeted bilateral breast ultrasound was performed utilizing high-resolution, real-time scanning.   In the right breast 12 o'clock 1 cm from the nipple, there is a 4.9 x 3.5 x 3.9 cm irregular, hypoechoic mass with angular margins. In the right axilla, there are two abnormal right axillary lymph nodes. There are scattered complicated cysts of varying size in the left breast.  No suspicious cystic or solid mass identified on the left. 1.  Irregular mass in the right breast 12 o'clock and right axillary lymphadenopathy are suspicious. 2.  Benign findings with no mammographic or sonographic evidence of malignancy in the left breast. Recommendation: Ultrasound-guided core biopsies of the right breast and right axilla are advised. BIRADS: BIRADS - CATEGORY 4 Suspicious Abnormality. Biopsy should be considered at this time. OVERALL ASSESSMENT - SUSPICIOUS A letter of notification will be sent to the patient regarding the results. RISK ASSESSMENT: During this patient's visit, information obtained was used to generate a lifetime risk assessment using the Tyrer-Cuzick model (also called the ADORE International Breast Cancer Intervention study Breast Cancer Risk Evaluation Tool). LIFETIME RISK: Patient has a Tyrer-Cuzick score of: 3.5% BREAST TISSUE DENSITY Heterogeneously Dense (grade C) AVERAGE RISK ( < 15% Lifetime Risk) Yearly screening mammograms starting at age 36 and older. Regardless of breast density, tomosynthesis is suggested. Monthly self-breast exams are recommended for women age 27 and older. Note: Mammography is not 100% accurate in the detection of breast cancer. Accuracy decreases as mammographic density of the breast increases. A negative mammogram should not deter further evaluation (including biopsy) of suspicious physical findings. Recommendations are based on NCCN ([address]) and ACR [employer] [facility]) guidelines. Thank you for sending your patient to this ACR and FDA approved facility.   If there are physician concerns regarding this report, a Radiologist can be reached by calling the following number [phone]. US BREAST LIMITED RIGHT    Result Date: 7/15/2022  EXAMINATION: DIAGNOSTIC DIGITAL BILATERAL BREASTS MAMMOGRAM WITH TOMOSYNTHESIS; TARGETED ULTRASOUND OF THE RIGHT BREAST; TARGETED ULTRASOUND OF THE LEFT BREAST, 7/15/2022 10:35 am TECHNIQUE: Diagnostic mammography of the bilateral breasts was performed with tomosynthesis. 2D standard and 3D tomosynthesis combination imaging performed through both breasts. Computer aided detection was utilized in the interpretation of this exam.; Targeted ultrasound of the right breast was performed.; Target ultrasound of the left breast was performed. Views: Right cc and bilateral MLO views with tomosynthesis COMPARISON: Multiple prior studies, the most recent dated July 13, 2022 HISTORY: ORDERING SYSTEM PROVIDED HISTORY: Breast mass, right TECHNOLOGIST PROVIDED HISTORY: [address] Patient in 0B Reason for exam:->Irregular soft tissue mass within the right breast measuring 6 cm x 4 cm x FINDINGS: Bilateral diagnostic mammogram: Exam is suboptimal due to lack of left CC view. The right breast is smaller than the left. There is diffuse subcutaneous edema and skin thickening on the right. There are bilateral focal asymmetries. Bilateral diagnostic ultrasound: Targeted bilateral breast ultrasound was performed utilizing high-resolution, real-time scanning. In the right breast 12 o'clock 1 cm from the nipple, there is a 4.9 x 3.5 x 3.9 cm irregular, hypoechoic mass with angular margins. In the right axilla, there are two abnormal right axillary lymph nodes. There are scattered complicated cysts of varying size in the left breast.  No suspicious cystic or solid mass identified on the left. 1.  Irregular mass in the right breast 12 o'clock and right axillary lymphadenopathy are suspicious.  2.  Benign findings with no mammographic or sonographic evidence of malignancy in the left breast. Recommendation: Ultrasound-guided core biopsies of the right breast and right axilla are advised. BIRADS: BIRADS - CATEGORY 4 Suspicious Abnormality. Biopsy should be considered at this time. OVERALL ASSESSMENT - SUSPICIOUS A letter of notification will be sent to the patient regarding the results. RISK ASSESSMENT: During this patient's visit, information obtained was used to generate a lifetime risk assessment using the Tyrer-Cuzick model (also called the ADORE International Breast Cancer Intervention study Breast Cancer Risk Evaluation Tool). LIFETIME RISK: Patient has a Tyrer-Cuzick score of: 3.5% BREAST TISSUE DENSITY Heterogeneously Dense (grade C) AVERAGE RISK ( < 15% Lifetime Risk) Yearly screening mammograms starting at age 36 and older. Regardless of breast density, tomosynthesis is suggested. Monthly self-breast exams are recommended for women age 27 and older. Note: Mammography is not 100% accurate in the detection of breast cancer. Accuracy decreases as mammographic density of the breast increases. A negative mammogram should not deter further evaluation (including biopsy) of suspicious physical findings. Recommendations are based on NCCN ([employer]) and ACR [employer] [facility]) guidelines. Thank you for sending your patient to this ACR and FDA approved facility. If there are physician concerns regarding this report, a Radiologist can be reached by calling the following number [phone]. CTA HEAD W CONTRAST    Result Date: 7/25/2022  EXAMINATION: CTA OF THE HEAD WITH CONTRAST WITH PERFUSION; CTA OF THE NECK; CTA OF THE HEAD WITH CONTRAST 7/25/2022 2:12 pm: TECHNIQUE: CTA of the head/brain was performed with the administration of intravenous contrast. Multiplanar reformatted images are provided for review. MIP images are provided for review. Automated exposure control, iterative reconstruction, and/or weight based adjustment of the mA/kV was utilized to reduce the radiation dose to as low as reasonably achievable. ; CTA of the neck was performed with the administration of intravenous contrast. Multiplanar reformatted images are provided for review. MIP images are provided for review. Stenosis of the internal carotid arteries measured using NASCET criteria. Automated exposure control, iterative reconstruction, and/or weight based adjustment of the mA/kV was utilized to reduce the radiation dose to as low as reasonably achievable. COMPARISON: None. HISTORY: ORDERING SYSTEM PROVIDED HISTORY: matthew TECHNOLOGIST PROVIDED HISTORY: Reason for exam:->matthew Has a \"code stroke\" or \"stroke alert\" been called? ->Yes Decision Support Exception - unselect if not a suspected or confirmed emergency medical condition->Emergency Medical Condition (MA) FINDINGS: CTA NECK: AORTIC ARCH/ARCH VESSELS: No dissection or arterial injury. No significant stenosis of the brachiocephalic or subclavian arteries. CAROTID ARTERIES: Prominent atherosclerotic in the proximal right ICA results in approximately 70% maximal stenosis. Mild calcified atherosclerosis in the left carotid bulb and proximal ICA results in less than 50% stenosis. VERTEBRAL ARTERIES: Approximately 50% stenotic plaque is seen at the origin of the left vertebral artery (coronal reformats, image series 614, image 51). The remainder of the left vertebral artery is unremarkable. The right vertebral artery is unremarkable. The SOFT TISSUES: The lung apices are clear. No cervical or superior mediastinal lymphadenopathy. The larynx and pharynx are unremarkable. No acute abnormality of the salivary and thyroid glands. BONES: No acute osseous abnormality. CTA HEAD: ANTERIOR CIRCULATION: No significant stenosis of the intracranial internal carotid, anterior cerebral, or middle cerebral arteries. No aneurysm. POSTERIOR CIRCULATION: No significant stenosis of the vertebral, basilar, or posterior cerebral arteries. No aneurysm. OTHER: No dural venous sinus thrombosis on this non-dedicated study. BRAIN: No mass effect or midline shift. No extra-axial fluid collection. The gray-white differentiation is maintained. CT PERFUSION: EXAM QUALITY: The examination is diagnostic with appropriate arterial inflow and venous outflow curves, and diagnostic perfusion maps. CORE INFARCT: The total area of ischemic core is 0 mL (CBF<30% volume). TOTAL HYPOPERFUSION: The total area of hypoperfusion is 0 mL (Tmax>6s volume). On the color images, there is suggestion of mild ischemia in the right parietal lobe, with T-max greater than 4 seconds, corresponding to the site of edema identified in the right parietal lobe. PENUMBRA: The penumbra (mismatch) volume is 0 mL and is located in the n/a. The mismatch ratio is n/a.     CT PERFUSION OF THE HEAD: 1. Area of ischemia is seen in the right parietal lobe, corresponding to the site of edema identified on the CT of the head. CTA OF THE HEAD: 1. No major arterial stenosis is seen in the brain. CTA OF THE NECK: 1. Approximately 70% stenosis in the proximal right ICA. 2. Less than 50% stenosis in the proximal left ICA. 3. Approximately 50% stenosis of the origin of the left vertebral artery. 4. No significant stenosis in the right vertebral artery. 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 These findings are concordant with imaging. Recommend surgical consultation and review by the [facility] interdisciplinary team with generation of multidisciplinary recommendations. Diagnostic mammogram is also advised in one year unless earlier is clinically indicated.      Result Date: 7/22/2022  EXAMINATION: ULTRASOUND-GUIDED right BREAST BIOPSY ULTRASOUND-GUIDED CLIP PLACEMENT 7/15/2022 HISTORY: ORDERING SYSTEM PROVIDED HISTORY: Breast mass, right TECHNOLOGIST PROVIDED HISTORY: [facility] Protocol Patient in [address] Reason for exam:->Irregular soft tissue mass within the right breast measuring 6 cm x 4 cm x 5.4 cm found on recent CTA chest on 7/13/2022 Reason for exam:->[facility] Protocol COMPARISON: July 15, 2022 TECHNIQUE: A timeout was performed to confirm patient identification and site of procedure. Risks, benefits, and alternatives of the procedure were discussed. Informed written consent was obtained. Transverse and longitudinal gray scale images were obtained of the right breast 12 o'clock mass. The biopsy site was prepped in standard fashion. 1% lidocaine was used for local anesthesia and a small skin incision was made. A 14-gauge core biopsy needle was advanced under sonographic guidance via a lateral approach. 3 cores were obtained and the needle was removed. A ribbon biopsy clip was placed at the biopsy site under ultrasound guidance. Pressure was applied for hemostasis. The patient tolerated the procedure well with no immediate complications. Technically successful ultrasound guided core biopsy of the right breast 12 o'clock mass and ribbon clip marker placement as described above. BIRADS: ZW - Pathology pending. 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 These findings are concordant with imaging. Recommend surgical consultation and review by the [facility] interdisciplinary team with generation of multidisciplinary recommendations. Diagnostic mammogram is also advised in one year unless earlier is clinically indicated.      Result Date: 7/22/2022  PROCEDURE: ULTRASOUND GUIDED CORE BIOPSY LYMPH NODE Ultrasound-guided clip placement 7/15/2022 HISTORY: ORDERING SYSTEM PROVIDED HISTORY: Abnormal ultrasound TECHNOLOGIST PROVIDED HISTORY: [facility] Protocol Patient in [address] Reason for exam:->Abnormal ultrasound PHYSICIANS: Margarette Dominguez Art SEDATION: None. PROCEDURE: Following discussion of alternatives, risks, and benefits, informed consent was obtained. A preprocedural time out was performed. The right axilla was prepped and draped in the usual sterile fashion. Local anesthesia was achieved with buffered 1 percent lidocaine. A 14 gauge core biopsy needle was utilized under real-time sonographic guidance to collect 3 cores from the right axillary lymphadenopathy. A ribbon shaped clip was placed. Hemostasis was obtained with manual compression. The patient tolerated the procedure without complication, verbalized understanding of postprocedural care instructions, and was discharged home in satisfactory condition. Surgical pathology report is pending at the time of the study, and addendum will be generated when this information becomes available. Successful ULTRASOUND guided core biopsy of right axillary lymphadenopathy. Pathology pending. [facility] SHAHEEN DIGITAL DIAGNOSTIC BILATERAL    Result Date: 7/15/2022  EXAMINATION: DIAGNOSTIC DIGITAL BILATERAL BREASTS MAMMOGRAM WITH TOMOSYNTHESIS; TARGETED ULTRASOUND OF THE RIGHT BREAST; TARGETED ULTRASOUND OF THE LEFT BREAST, 7/15/2022 10:35 am TECHNIQUE: Diagnostic mammography of the bilateral breasts was performed with tomosynthesis. 2D standard and 3D tomosynthesis combination imaging performed through both breasts. Computer aided detection was utilized in the interpretation of this exam.; Targeted ultrasound of the right breast was performed.; Target ultrasound of the left breast was performed.  Views: Right cc and bilateral MLO views with tomosynthesis COMPARISON: Multiple prior studies, the most recent dated July 13, 2022 HISTORY: ORDERING SYSTEM PROVIDED HISTORY: Breast mass, right TECHNOLOGIST PROVIDED HISTORY: [address] Patient in 0B Reason for exam:->Irregular soft tissue mass within the right breast measuring 6 cm x 4 cm x FINDINGS: Bilateral diagnostic mammogram: Exam is suboptimal due to lack of left CC view. The right breast is smaller than the left. There is diffuse subcutaneous edema and skin thickening on the right. There are bilateral focal asymmetries. Bilateral diagnostic ultrasound: Targeted bilateral breast ultrasound was performed utilizing high-resolution, real-time scanning. In the right breast 12 o'clock 1 cm from the nipple, there is a 4.9 x 3.5 x 3.9 cm irregular, hypoechoic mass with angular margins. In the right axilla, there are two abnormal right axillary lymph nodes. There are scattered complicated cysts of varying size in the left breast.  No suspicious cystic or solid mass identified on the left. 1.  Irregular mass in the right breast 12 o'clock and right axillary lymphadenopathy are suspicious. 2.  Benign findings with no mammographic or sonographic evidence of malignancy in the left breast. Recommendation: Ultrasound-guided core biopsies of the right breast and right axilla are advised. BIRADS: BIRADS - CATEGORY 4 Suspicious Abnormality. Biopsy should be considered at this time. OVERALL ASSESSMENT - SUSPICIOUS A letter of notification will be sent to the patient regarding the results. RISK ASSESSMENT: During this patient's visit, information obtained was used to generate a lifetime risk assessment using the Tyrer-Cuzick model (also called the [employer] Breast Cancer Intervention study Breast Cancer Risk Evaluation Tool). LIFETIME RISK: Patient has a Tyrer-Cuzick score of: 3.5% BREAST TISSUE DENSITY Heterogeneously Dense (grade C) AVERAGE RISK ( < 15% Lifetime Risk) Yearly screening mammograms starting at age 36 and older. Regardless of breast density, tomosynthesis is suggested. Monthly self-breast exams are recommended for women age 27 and older. Note: Mammography is not 100% accurate in the detection of breast cancer. Accuracy decreases as mammographic density of the breast increases.   A negative mammogram should not deter further evaluation (including biopsy) of suspicious physical findings. Recommendations are based on NCCN ([address]) and ACR [employer] of Radiology) guidelines. Thank you for sending your patient to this ACR and FDA approved facility. If there are physician concerns regarding this report, a Radiologist can be reached by calling the following number [phone]. US DUP LOWER EXTREMITIES BILATERAL VENOUS    Result Date: 2022  Patient MRN:  [id number] : 1955 Age: 78 years Gender: Female Order Date:  2022 6:32 PM EXAM: US DUP LOWER EXTREMITIES BILATERAL VENOUS NUMBER OF IMAGES:  48 INDICATION:  swelling swelling What reading provider will be dictating this exam?->[facility] COMPARISON: None Within the visualized vessels, there is no evidence for deep venous thrombosis There is good compressibility, there is good augmentation, there is good color flow. 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 These findings are concordant with imaging. Recommend surgical consultation and review by the [facility] interdisciplinary team with generation of multidisciplinary recommendations. Diagnostic mammogram is also advised in one year unless earlier is clinically indicated. Result Date: 2022  PROCEDURE: ULTRASOUND GUIDED CORE BIOPSY LYMPH NODE Ultrasound-guided clip placement 7/15/2022 HISTORY: ORDERING SYSTEM PROVIDED HISTORY: Abnormal ultrasound TECHNOLOGIST PROVIDED HISTORY: [facility] Protocol Patient in [address] Reason for exam:->Abnormal ultrasound PHYSICIANS: Aletha Mariee SEDATION: None. PROCEDURE: Following discussion of alternatives, risks, and benefits, informed consent was obtained.   A preprocedural time out was performed. The right axilla was prepped and draped in the usual sterile fashion. Local anesthesia was achieved with buffered 1 percent lidocaine. A 14 gauge core biopsy needle was utilized under real-time sonographic guidance to collect 3 cores from the right axillary lymphadenopathy. A ribbon shaped clip was placed. Hemostasis was obtained with manual compression. The patient tolerated the procedure without complication, verbalized understanding of postprocedural care instructions, and was discharged home in satisfactory condition. Surgical pathology report is pending at the time of the study, and addendum will be generated when this information becomes available. Successful ULTRASOUND guided core biopsy of right axillary lymphadenopathy. Pathology pending. ASSESSMENT:    Acute ischemic stroke  Acute hypoxic and hypercapnic respiratory failure  Hyper kalemia  Acute respiratory acidosis  DIANN  Elevated troponin in the setting of acute respiratory failure and a stroke. -unlikely ACS, likely demand ischemia  Recent diagnosis of right breast cancer. Status post ultrasound-guided core biopsy of the right axillary lymphadenopathy. 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 Doxycycline x 5 days. Pulmonary consult  -BiPAP  -Continue aspirin. Start Plavix. Statins  -Lovenox for DVT prophylaxis  - IVF, carefully monitor hemodynamics and renal function. Got 1 L NS bolus on ED. Continue NS at 100 cc/ min  - Treat hyperkalemia and monitor K  -Hypotension and bradycardia. Cosyntropin stimulation test  -Consult oncology for recommendations      Diet: No diet orders on file  Code Status: Prior  Surrogate decision maker confirmed with patient:       DVT Prophylaxis: []Lovenox []Heparin []PCD [] [address] []Encouraged ambulation  Disposition: []Med/Surg [] Intermediate [] ICU/CCU  Admit status: [] Observation [] Inpatient     +++++++++++++++++++++++++++++++++++++++++++++++++  Gonzalo Mota MD  [facility] [place] Physician - [address] [place] [address]  +++++++++++++++++++++++++++++++++++++++++++++++++  NOTE: This report was transcribed using voice recognition software. Every effort was made to ensure accuracy; however, inadvertent computerized transcription errors may be present.

## 2022-07-25 NOTE — ED NOTES
Radiology Procedure Waiver   Name: Zara Fuentes  : 1955  MRN: [id number]    Date:  22    Time: 1:55 PM EDT    Benefits of immediately proceeding with Radiology exam(s) without pre-testing outweigh the risks or are not indicated as specified below and therefore the following is/are being waived:    [] Pregnancy test   [] Patients LMP on-time and regular.   [] Patient had Tubal Ligation or has other Contraception Device. [] Patient  is Menopausal or Premenarcheal.    [] Patient had Full or Partial Hysterectomy. [] Protocol for Iodine allergy    [] MRI Questionnaire     [x] BUN/Creatinine   [] Patient age w/no hx of renal dysfunction. [] Patient on Dialysis. [] Recent Normal Labs.   Electronically signed by Joellen Lopez DO on 22 at 1:55 PM EDT               Joellen Lopez DO  Resident  [phone]

## 2022-07-26 ENCOUNTER — APPOINTMENT (OUTPATIENT)
Dept: ULTRASOUND IMAGING | Age: 67
DRG: 064 | End: 2022-07-26
Payer: MEDICARE

## 2022-07-26 ENCOUNTER — APPOINTMENT (OUTPATIENT)
Dept: CT IMAGING | Age: 67
DRG: 064 | End: 2022-07-26
Payer: MEDICARE

## 2022-07-26 LAB
AADO2: 29.2 MMHG
ALBUMIN SERPL-MCNC: 3.2 G/DL (ref 3.5–5.2)
ALP BLD-CCNC: 93 U/L (ref 35–104)
ALT SERPL-CCNC: 339 U/L (ref 0–32)
AMMONIA: 33 UMOL/L (ref 11–51)
ANION GAP SERPL CALCULATED.3IONS-SCNC: 12 MMOL/L (ref 7–16)
AST SERPL-CCNC: 156 U/L (ref 0–31)
B.E.: 4.1 MMOL/L (ref -3–3)
B.E.: 7.7 MMOL/L (ref -3–3)
BILIRUB SERPL-MCNC: 1.8 MG/DL (ref 0–1.2)
BUN BLDV-MCNC: 59 MG/DL (ref 6–23)
CALCIUM SERPL-MCNC: 8.8 MG/DL (ref 8.6–10.2)
CHLORIDE BLD-SCNC: 99 MMOL/L (ref 98–107)
CHOLESTEROL, TOTAL: 170 MG/DL (ref 0–199)
CO2: 29 MMOL/L (ref 22–29)
COHB: 1.2 % (ref 0–1.5)
COHB: 1.4 % (ref 0–1.5)
CREAT SERPL-MCNC: 1.6 MG/DL (ref 0.5–1)
CRITICAL: ABNORMAL
CRITICAL: ABNORMAL
DATE ANALYZED: ABNORMAL
DATE ANALYZED: ABNORMAL
DATE OF COLLECTION: ABNORMAL
DATE OF COLLECTION: ABNORMAL
FIO2: 30 %
GFR AFRICAN AMERICAN: 39
GFR NON-AFRICAN AMERICAN: 32 ML/MIN/1.73
GLUCOSE BLD-MCNC: 123 MG/DL (ref 74–99)
HBA1C MFR BLD: 5.8 % (ref 4–5.6)
HCO3: 33.9 MMOL/L (ref 22–26)
HCO3: 37 MMOL/L (ref 22–26)
HDLC SERPL-MCNC: 36 MG/DL
HHB: 3.2 % (ref 0–5)
HHB: 3.3 % (ref 0–5)
LAB: ABNORMAL
LAB: ABNORMAL
LDL CHOLESTEROL CALCULATED: 110 MG/DL (ref 0–99)
Lab: ABNORMAL
Lab: ABNORMAL
METER GLUCOSE: 106 MG/DL (ref 74–99)
METER GLUCOSE: 112 MG/DL (ref 74–99)
METER GLUCOSE: 132 MG/DL (ref 74–99)
METER GLUCOSE: 136 MG/DL (ref 74–99)
METER GLUCOSE: 145 MG/DL (ref 74–99)
METER GLUCOSE: 164 MG/DL (ref 74–99)
METER GLUCOSE: 266 MG/DL (ref 74–99)
METHB: 0.3 % (ref 0–1.5)
METHB: 0.4 % (ref 0–1.5)
MODE: ABNORMAL
MODE: ABNORMAL
O2 SATURATION: 96.1 % (ref 92–98.5)
O2 SATURATION: 96.2 % (ref 92–98.5)
O2HB: 95.1 % (ref 94–97)
O2HB: 95.1 % (ref 94–97)
OPERATOR ID: 659
OPERATOR ID: 89
PATIENT TEMP: 37 C
PATIENT TEMP: 37 C
PCO2: 72.3 MMHG (ref 35–45)
PCO2: 73.9 MMHG (ref 35–45)
PEEP/CPAP: 5 CMH2O
PFO2: 3.08 MMHG/%
PH BLOOD GAS: 7.28 (ref 7.35–7.45)
PH BLOOD GAS: 7.33 (ref 7.35–7.45)
PO2: 92.3 MMHG (ref 75–100)
PO2: 94.9 MMHG (ref 75–100)
POTASSIUM SERPL-SCNC: 4.7 MMOL/L (ref 3.5–5)
POTASSIUM SERPL-SCNC: 5.3 MMOL/L (ref 3.5–5)
POTASSIUM SERPL-SCNC: 5.3 MMOL/L (ref 3.5–5)
POTASSIUM SERPL-SCNC: 6 MMOL/L (ref 3.5–5)
REASON FOR REJECTION: NORMAL
REJECTED TEST: NORMAL
RI(T): 0.32
RR MECHANICAL: 16 B/MIN
SODIUM BLD-SCNC: 140 MMOL/L (ref 132–146)
SOURCE, BLOOD GAS: ABNORMAL
SOURCE, BLOOD GAS: ABNORMAL
THB: 16.1 G/DL (ref 11.5–16.5)
THB: 16.5 G/DL (ref 11.5–16.5)
TIME ANALYZED: 1050
TIME ANALYZED: 1759
TOTAL PROTEIN: 6.2 G/DL (ref 6.4–8.3)
TRIGL SERPL-MCNC: 121 MG/DL (ref 0–149)
VLDLC SERPL CALC-MCNC: 24 MG/DL
VT MECHANICAL: 400 ML

## 2022-07-26 PROCEDURE — 6370000000 HC RX 637 (ALT 250 FOR IP): Performed by: STUDENT IN AN ORGANIZED HEALTH CARE EDUCATION/TRAINING PROGRAM

## 2022-07-26 PROCEDURE — 2580000003 HC RX 258: Performed by: INTERNAL MEDICINE

## 2022-07-26 PROCEDURE — 94660 CPAP INITIATION&MGMT: CPT

## 2022-07-26 PROCEDURE — 80061 LIPID PANEL: CPT

## 2022-07-26 PROCEDURE — 2580000003 HC RX 258: Performed by: STUDENT IN AN ORGANIZED HEALTH CARE EDUCATION/TRAINING PROGRAM

## 2022-07-26 PROCEDURE — 2060000000 HC ICU INTERMEDIATE R&B

## 2022-07-26 PROCEDURE — 93971 EXTREMITY STUDY: CPT

## 2022-07-26 PROCEDURE — 6360000002 HC RX W HCPCS: Performed by: STUDENT IN AN ORGANIZED HEALTH CARE EDUCATION/TRAINING PROGRAM

## 2022-07-26 PROCEDURE — 74176 CT ABD & PELVIS W/O CONTRAST: CPT

## 2022-07-26 PROCEDURE — 2700000000 HC OXYGEN THERAPY PER DAY

## 2022-07-26 PROCEDURE — 93926 LOWER EXTREMITY STUDY: CPT

## 2022-07-26 PROCEDURE — 82140 ASSAY OF AMMONIA: CPT

## 2022-07-26 PROCEDURE — 6360000002 HC RX W HCPCS

## 2022-07-26 PROCEDURE — 82962 GLUCOSE BLOOD TEST: CPT

## 2022-07-26 PROCEDURE — 6370000000 HC RX 637 (ALT 250 FOR IP): Performed by: INTERNAL MEDICINE

## 2022-07-26 PROCEDURE — 2500000003 HC RX 250 WO HCPCS: Performed by: INTERNAL MEDICINE

## 2022-07-26 PROCEDURE — 80053 COMPREHEN METABOLIC PANEL: CPT

## 2022-07-26 PROCEDURE — 84132 ASSAY OF SERUM POTASSIUM: CPT

## 2022-07-26 PROCEDURE — 94640 AIRWAY INHALATION TREATMENT: CPT

## 2022-07-26 PROCEDURE — 6360000002 HC RX W HCPCS: Performed by: INTERNAL MEDICINE

## 2022-07-26 PROCEDURE — 36415 COLL VENOUS BLD VENIPUNCTURE: CPT

## 2022-07-26 PROCEDURE — 36600 WITHDRAWAL OF ARTERIAL BLOOD: CPT

## 2022-07-26 PROCEDURE — 2500000003 HC RX 250 WO HCPCS: Performed by: STUDENT IN AN ORGANIZED HEALTH CARE EDUCATION/TRAINING PROGRAM

## 2022-07-26 PROCEDURE — 82805 BLOOD GASES W/O2 SATURATION: CPT

## 2022-07-26 PROCEDURE — 83036 HEMOGLOBIN GLYCOSYLATED A1C: CPT

## 2022-07-26 RX ORDER — LORAZEPAM 2 MG/ML
0.5 INJECTION INTRAMUSCULAR EVERY 6 HOURS PRN
Status: DISCONTINUED | OUTPATIENT
Start: 2022-07-26 | End: 2022-07-27

## 2022-07-26 RX ORDER — METHYLPREDNISOLONE SODIUM SUCCINATE 40 MG/ML
20 INJECTION, POWDER, LYOPHILIZED, FOR SOLUTION INTRAMUSCULAR; INTRAVENOUS DAILY
Status: COMPLETED | OUTPATIENT
Start: 2022-07-27 | End: 2022-07-28

## 2022-07-26 RX ORDER — ARFORMOTEROL TARTRATE 15 UG/2ML
15 SOLUTION RESPIRATORY (INHALATION) 2 TIMES DAILY
Status: DISCONTINUED | OUTPATIENT
Start: 2022-07-26 | End: 2022-08-04 | Stop reason: HOSPADM

## 2022-07-26 RX ORDER — DEXTROSE MONOHYDRATE 100 MG/ML
INJECTION, SOLUTION INTRAVENOUS CONTINUOUS PRN
Status: DISCONTINUED | OUTPATIENT
Start: 2022-07-26 | End: 2022-08-04 | Stop reason: HOSPADM

## 2022-07-26 RX ORDER — HALOPERIDOL 5 MG/ML
2 INJECTION INTRAMUSCULAR ONCE
Status: COMPLETED | OUTPATIENT
Start: 2022-07-26 | End: 2022-07-26

## 2022-07-26 RX ORDER — DEXTROSE MONOHYDRATE 100 MG/ML
INJECTION, SOLUTION INTRAVENOUS CONTINUOUS PRN
Status: DISCONTINUED | OUTPATIENT
Start: 2022-07-26 | End: 2022-07-29 | Stop reason: SDUPTHER

## 2022-07-26 RX ORDER — MORPHINE SULFATE 2 MG/ML
1 INJECTION, SOLUTION INTRAMUSCULAR; INTRAVENOUS ONCE
Status: COMPLETED | OUTPATIENT
Start: 2022-07-26 | End: 2022-07-26

## 2022-07-26 RX ORDER — BUDESONIDE 0.5 MG/2ML
500 INHALANT ORAL 2 TIMES DAILY
Status: DISCONTINUED | OUTPATIENT
Start: 2022-07-26 | End: 2022-08-04 | Stop reason: HOSPADM

## 2022-07-26 RX ORDER — MECOBALAMIN 5000 MCG
5 TABLET,DISINTEGRATING ORAL NIGHTLY
Status: DISCONTINUED | OUTPATIENT
Start: 2022-07-26 | End: 2022-08-04 | Stop reason: HOSPADM

## 2022-07-26 RX ADMIN — IPRATROPIUM BROMIDE AND ALBUTEROL SULFATE 1 AMPULE: .5; 2.5 SOLUTION RESPIRATORY (INHALATION) at 12:59

## 2022-07-26 RX ADMIN — DEXTROSE MONOHYDRATE 250 ML: 100 INJECTION, SOLUTION INTRAVENOUS at 14:07

## 2022-07-26 RX ADMIN — DEXTROSE MONOHYDRATE: 100 INJECTION, SOLUTION INTRAVENOUS at 13:56

## 2022-07-26 RX ADMIN — ENOXAPARIN SODIUM 30 MG: 100 INJECTION SUBCUTANEOUS at 11:29

## 2022-07-26 RX ADMIN — COSYNTROPIN 250 MCG: 0.25 INJECTION, POWDER, LYOPHILIZED, FOR SOLUTION INTRAMUSCULAR; INTRAVENOUS at 11:29

## 2022-07-26 RX ADMIN — INSULIN HUMAN 10 UNITS: 100 INJECTION, SOLUTION PARENTERAL at 13:57

## 2022-07-26 RX ADMIN — ASPIRIN 81 MG: 81 TABLET, COATED ORAL at 11:28

## 2022-07-26 RX ADMIN — BUDESONIDE 500 MCG: 0.5 SUSPENSION RESPIRATORY (INHALATION) at 21:14

## 2022-07-26 RX ADMIN — IPRATROPIUM BROMIDE AND ALBUTEROL SULFATE 1 AMPULE: .5; 2.5 SOLUTION RESPIRATORY (INHALATION) at 16:37

## 2022-07-26 RX ADMIN — CLOPIDOGREL BISULFATE 75 MG: 75 TABLET ORAL at 11:29

## 2022-07-26 RX ADMIN — DOXYCYCLINE HYCLATE 100 MG: 100 CAPSULE ORAL at 11:28

## 2022-07-26 RX ADMIN — SODIUM ZIRCONIUM CYCLOSILICATE 5 G: 10 POWDER, FOR SUSPENSION ORAL at 11:28

## 2022-07-26 RX ADMIN — ARFORMOTEROL TARTRATE 15 MCG: 15 SOLUTION RESPIRATORY (INHALATION) at 21:13

## 2022-07-26 RX ADMIN — Medication 5 MG: at 20:27

## 2022-07-26 RX ADMIN — METHYLPREDNISOLONE SODIUM SUCCINATE 40 MG: 40 INJECTION, POWDER, FOR SOLUTION INTRAMUSCULAR; INTRAVENOUS at 11:29

## 2022-07-26 RX ADMIN — MORPHINE SULFATE 1 MG: 2 INJECTION, SOLUTION INTRAMUSCULAR; INTRAVENOUS at 20:29

## 2022-07-26 RX ADMIN — IPRATROPIUM BROMIDE AND ALBUTEROL SULFATE 1 AMPULE: .5; 2.5 SOLUTION RESPIRATORY (INHALATION) at 21:12

## 2022-07-26 RX ADMIN — ROSUVASTATIN 20 MG: 20 TABLET, FILM COATED ORAL at 20:27

## 2022-07-26 RX ADMIN — SODIUM BICARBONATE 50 MEQ: 84 INJECTION, SOLUTION INTRAVENOUS at 13:56

## 2022-07-26 RX ADMIN — SODIUM ZIRCONIUM CYCLOSILICATE 5 G: 10 POWDER, FOR SUSPENSION ORAL at 20:27

## 2022-07-26 RX ADMIN — SODIUM BICARBONATE 50 MEQ: 84 INJECTION, SOLUTION INTRAVENOUS at 20:29

## 2022-07-26 RX ADMIN — SODIUM CHLORIDE: 9 INJECTION, SOLUTION INTRAVENOUS at 04:37

## 2022-07-26 RX ADMIN — INSULIN HUMAN 10 UNITS: 100 INJECTION, SOLUTION PARENTERAL at 20:28

## 2022-07-26 RX ADMIN — HALOPERIDOL LACTATE 2 MG: 5 INJECTION, SOLUTION INTRAMUSCULAR at 17:16

## 2022-07-26 RX ADMIN — DOXYCYCLINE HYCLATE 100 MG: 100 CAPSULE ORAL at 20:28

## 2022-07-26 RX ADMIN — DEXTROSE MONOHYDRATE 250 ML: 100 INJECTION, SOLUTION INTRAVENOUS at 20:47

## 2022-07-26 ASSESSMENT — PAIN SCALES - GENERAL
PAINLEVEL_OUTOF10: 10
PAINLEVEL_OUTOF10: 0
PAINLEVEL_OUTOF10: 8
PAINLEVEL_OUTOF10: 10

## 2022-07-26 ASSESSMENT — PAIN DESCRIPTION - LOCATION
LOCATION: PELVIS
LOCATION: LEG

## 2022-07-26 NOTE — CONSULTS
Palliative Care Department  [phone]  Palliative Care Initial Consult  Provider Leigh Marie, APRN - CNP      PATIENT: Mackenzie Galdamez  : 1955  MRN: [id number]  ADMISSION DATE: 2022  1:54 PM  Referring Provider: Felix Cooper MD    Palliative Medicine was consulted on hospital day 1 for assistance with Goals of care, Code Status Discussion. HPI:     Juan Manuel Smith is a 78 y.o. y/o female with a history of pulmonary hypertension, tobacco use, right breast cancer, COPD, hypertension who presented to [facility]) on 2022 with concern for stroke. Per family, patient was experiencing strokelike symptoms, upon EMS arrival patient was found to be hypoxic with oxygen saturation of 56% on room air, she was placed under nonrebreather mask, currently on following nasal cannula. CT scan shows subacute infarction with area of ischemia in the right parietal and left occipital lobes. CT of the head and neck shows 70% stenosis of the right proximal ICA. Patient was out of the window for tPA, aspirin was given in the emergency room. Oncology, pulmonology, and neurology consulted. Palliative medicine consulted to discuss goal of care and CODE STATUS discussion. ASSESSMENT/PLAN:     Pertinent Hospital Diagnoses     Acute ischemic stroke  Acute hypoxic and hypercapnic respiratory failure  Acute kidney injury    Palliative Care Encounter / Counseling Regarding Goals of Care  Please see detailed goals of care discussion as below  At this time, Mackenzie Galdamez, Does Not have capacity for medical decision-making. Capacity is time limited and situation/question specific  During encounter no one was surrogate medical decision-maker  Outcome of goals of care meeting:   Will depends upon making contact with family  Patient's  contact [facility], phone number is incorrect  Patient's son Traci Lau left voicemail to call us back  Code status Full Code  Advanced Directives: no POA or living will in epic  Surrogate/Legal NOK:  Candida Ojeda ([phone]  Sarah Solders (Child) [phone]     Spiritual assessment: no spiritual distress identified  Bereavement and grief: to be determined  Referrals to: none today    Thank you for the opportunity to participate in the care of Debbie Patterson. ANGELITA Virgen CNP  Palliative Medicine     SUBJECTIVE:     Details of Conversation:     Chart reviewed. I was not able to see patient at the bedside. She was currently off the floor having an MRI. Spoke with bedside nurse. Attempt to call patient's daughter, Kristel Hendrix, but phone number on patient records is incorrect. Attempt to call patient's son Jone Alegria over the phone, I was able to leave voicemail to call us back. We will try again at a later time. Prognosis: Guarded    OBJECTIVE:     BP (!) 120/57   Pulse 80   Temp 98 °F (36.7 °C) (Temporal)   Resp 18   Ht 5' 7\" (1.702 m)   Wt 110 lb 14.4 oz (50.3 kg)   SpO2 100%   BMI 17.37 kg/m²     Physical Examination: Per chart reviewed  Gen: elderly, thin, NAD, awake, alert   HEENT: normocephalic, atraumatic, PERRL, EOMI,   Neck: trachea midline, no JVD  Lungs: Expiratory wheezes, on BiPAP   heart: regular rate and rhythm, distant heart tones,   Abdomen: normoactive bowel sounds, soft, non-tender  Extremities: Right foot erythema and edema   skin: warm, dry without rashes, lesions, bruising  Neuro: Neurovascularly intact without any focal sensory/motor deficits. Cranial nerves: II-XII intact, grossly non-focal.  Left-sided hemiparesis and left-sided lower facial droop    Objective data reviewed: labs, images, records, medication use, vitals, and chart    Time/Communication  Greater than 50% of time spent, total 30 minutes in counseling and coordination of care at the bedside regarding  CODE STATUS discussion and goals of care. Thank you for allowing Palliative Medicine to participate in the care of Debbie Patterson.     Note: This report was completed using computerize voiced recognition software. Every effort has been made to ensure accuracy; however, inadvertent computerized transcription errors may be present.

## 2022-07-26 NOTE — PROGRESS NOTES
Called by RN to place patient on [id number]. RT arrived to room and patient is unavailable so RN will notify RT when patient arrives back to room from testing.

## 2022-07-26 NOTE — PROGRESS NOTES
Date: 7/26/2022    Time: 2:13 PM    Patient Placed On BIPAP/CPAP/ Non-Invasive Ventilation? Yes    If no must comment. Facial area red/color change? No           If YES are Blister/Lesion present? No   If yes must notify nursing staff  BIPAP/CPAP skin barrier? Yes    Skin barrier type:mepilexlite       Comments:  Pt placed on per Dr. Sonia Mohamud requested patient wears 4 hours and after 4 hours we draw an ABG.      Alexis Sunshine RCP

## 2022-07-26 NOTE — PROGRESS NOTES
Ashlie Mae NP was notified of pt pulling off bipap.   It was ok, to take the pt off bipap and she was put on NC

## 2022-07-26 NOTE — CARE COORDINATION
Per notes-had concerns including Cerebrovascular Accident (Son states she has been weak for the last day or two and has been leaning to one side; he states when she woke up at 1130 today she could not move her L side; he also states she just heard she has breast cancer and it is metastasizing ). Pulmonology, oncology consults. MRI brain ordered. Continues on bipap. Met with patient and son Fortino Pascual to discuss role and transition of care. She lives with her 6 yr grandson and has support of her 3 children. She has no PCP. She uses [facility] on [address]. She has been independent  Prior to admission. No DME,HHC, ELBERT or oxygen. cm/sw to follow. Electronically signed by Charla Mcallister RN on 7/26/2022 at 2:58 PM

## 2022-07-26 NOTE — PROGRESS NOTES
Deb Myers was notified of pt's low heart rate of 40 bpm.  He ordered solumedrol and will be notified if the HR gets lower.

## 2022-07-26 NOTE — CONSULTS
NEUROLOGY CONSULT NOTE       Requesting Physician: Kimberli Byers MD     Reason for Consult:  Evaluate for \"subacute stroke\"      IMPRESSION:  Probable stroke in the right middle cerebral artery territory, but tumor cannot be completely ruled out. Patient is confused in addition to having a left hemiparesis. RECOMMENDATIONS:  Agree with MRI, aspirin and Plavix, and Crestor. With the evidence of carotid disease, consideration could be made for continuing dual antiplatelet therapy for a few months before going to a single drug. Endarterectomy might be considered for the carotid stenosis, but I think that would depend on further assessment of her cancer. Continue hydration as tolerated from a cardiac standpoint. Smoking cessation discussed with patient. Therapies. History of Present Illness:  Debbie Patterson is a 78 y.o. female admitted to [facility] [address] on 7/25/2022. Patient was recently discharged after being diagnosed with breast cancer. Yesterday she was found with left-sided weakness and numbness. Oxygen saturation was only 56%. In the ER her NIH stroke scale was 13 with no movement on the left. BUN 73, creatinine 2.1, potassium of 6.4.     Past Medical History:        Diagnosis Date    Anxiety     Arthritis     Cancer ([address]     Chronic bronchitis ([facility] [address]     Emphysema     according to xray report 2013    Walking pneumonia    Breast mass      Procedure Laterality Date    APPENDECTOMY      INDER US PERQ DEVICE SOFT TISSUE PLMT  FIRST LESION  7/15/2022    INDER US PERQ DEVICE SOFT TISSUE PLMT  FIRST LESION 7/15/2022 [facility]    US BREAST NEEDLE BIOPSY RIGHT Right 7/15/2022    US BREAST NEEDLE BIOPSY RIGHT 7/15/2022 [facility]       Social History:  Social History     Tobacco Use   Smoking Status Every Day    Packs/day: 1.00    Types: Cigarettes   Smokeless Tobacco Never     Social History     Substance and Sexual Activity   Alcohol Use No     Social History     Substance and Sexual Activity   Drug Use No         Family History:       Problem Relation Age of Onset    Cancer Mother         brain    Cancer Father         lung    Lung Cancer Father     Cancer Sister         cervical       Review of Systems:  Patient is giving illogical answers to questions not pertinent to what was asked. Seems to indicate hearing is decreased. On oxygen. Allergies: Allergies   Allergen Reactions    Bee Venom Swelling        Current Medications:   insulin regular (HUMULIN R;NOVOLIN R) injection 10 Units, Once   And  dextrose bolus 10% 250 mL, Once  glucose chewable tablet 16 g, PRN  dextrose bolus 10% 125 mL, PRN   Or  dextrose bolus 10% 250 mL, PRN  glucagon (rDNA) injection 1 mg, PRN  dextrose 10 % infusion, Continuous PRN  sodium bicarbonate 8.4 % injection 50 mEq, Once  aspirin chewable tablet 324 mg, Once  dextrose 50 % IV solution, PRN  sodium zirconium cyclosilicate (LOKELMA) oral suspension 5 g, TID  enoxaparin Sodium (LOVENOX) injection 30 mg, Daily  ipratropium-albuterol (DUONEB) nebulizer solution 1 ampule, Q4H WA  aspirin EC tablet 81 mg, Daily  clopidogrel (PLAVIX) tablet 75 mg, Daily  rosuvastatin (CRESTOR) tablet 20 mg, Nightly  0.9 % sodium chloride infusion, Continuous  methylPREDNISolone sodium (SOLU-MEDROL) injection 40 mg, Q12H  doxycycline hyclate (VIBRAMYCIN) capsule 100 mg, 2 times per day  cosyntropin (CORTROSYN) injection 250 mcg, Once       Medications Prior to Admission: carvedilol (COREG) 6.25 MG tablet, Take 1 tablet by mouth in the morning and 1 tablet in the evening. Take with meals. furosemide (LASIX) 40 MG tablet, Take 1 tablet by mouth in the morning. potassium chloride (KLOR-CON M) 20 MEQ extended release tablet, Take 2 tablets by mouth in the morning.     Physical Exam:  BP (!) 120/57   Pulse 80   Temp 98 °F (36.7 °C) (Temporal)   Resp 18   Ht 5' 7\" (1.702 m)   Wt 110 lb 14.4 oz (50.3 kg)   SpO2 100%   BMI 17.37 kg/m²  I Body mass index is 17.37 kg/m². I   Wt Readings from Last 1 Encounters:   07/25/22 110 lb 14.4 oz (50.3 kg)        GENERAL: Pleasant and cooperative patient who is in no apparent acute distress other than being cold. She seems to exhibit some neglect of her deficits. EYE:  Fundi not examined due to the Covid-19 outbreak  CARDIOVASCULAR:  Heart regular rhythm with mild bradycardia. I did not persist at trying to auscultate carotids because she kept talking. NEUROLOGIC:  Level of Alertness: alert  Orientation: Patient knew her son, oriented to correct month, and oriented to place  Memory and Fund of Knowledge: Unable to assess well. She could not name the president. Attention/Concentration: Inattentive. Language: She was able to repeat a sentence and answer some questions but typically she rambled on about topics other than what was asked. She was able to do things like ask for another blanket. No clear aphasia. Cranial Nerves: pupils are equal; extraocular muscles intact; seems to be more attentive to the right side in space and did not blink to threat from the left but seem to see both my hands and her the peripheries of her vision; facial sensation intact; did not move face to command but not obviously asymmetric; hearing is intact to soft voice; did not cooperate for lower cranial nerve testing. Motor Exam: Tone increased on the left in the parietal distribution. Some elbow flexion but unclear if this is just increased tone or some volitional movement. MRC grade 1 movement present in the lower extremity on the left. Uses the right side well. Sensory: Sensory symmetric to light touch  Coordination: patient unable to cooperate.   Deep Tendon Reflexes: Brisk bilaterally  Plantar Responses: No response on the left  Abnormal movements: none  Station and gait: Unsafe to attempt due to severe left hemiparesis    Diagnostics:  CBC:   Lab Results   Component Value Date/Time    WBC 10.4 07/25/2022 02:41 PM    RBC 4.85 07/25/2022 02:41

## 2022-07-26 NOTE — PLAN OF CARE
Problem: Confusion  Goal: Confusion, delirium, dementia, or psychosis is improved or at baseline  Description: INTERVENTIONS:  1. Assess for possible contributors to thought disturbance, including medications, impaired vision or hearing, underlying metabolic abnormalities, dehydration, psychiatric diagnoses, and notify attending LIP  2. [place] high risk fall precautions, as indicated  3. Provide frequent short contacts to provide reality reorientation, refocusing and direction  4. Decrease environmental stimuli, including noise as appropriate  5. Monitor and intervene to maintain adequate nutrition, hydration, elimination, sleep and activity  6. If unable to ensure safety without constant attention obtain sitter and review sitter guidelines with assigned personnel  7. Initiate Psychosocial CNS and Spiritual Care consult, as indicated  Outcome: Progressing Towards Goal     Problem: Skin/Tissue Integrity  Goal: Absence of new skin breakdown  Description: 1. Monitor for areas of redness and/or skin breakdown  2. Assess vascular access sites hourly  3. Every 4-6 hours minimum:  Change oxygen saturation probe site  4. Every 4-6 hours:  If on nasal continuous positive airway pressure, respiratory therapy assess nares and determine need for appliance change or resting period.   Outcome: Progressing Towards Goal     Problem: Safety - Adult  Goal: Free from fall injury  Outcome: Progressing Towards Goal

## 2022-07-26 NOTE — PROGRESS NOTES
Spoke to [employer] and she is unable to bring over the patient and will call when they can come for their exam.

## 2022-07-26 NOTE — CONSULTS
Suzy Lyles M.D.,[facility]  Alvarez Carmichael D.O., F.A.C.O.I., Rene Ley M.D. Henna Interiano M.D. Queen Mina D.O. Patient:  Martha Grimes 78 y.o. female MRN: [id number]     Date of Service: 7/26/2022      PULMONARY CONSULTATION    Reason for Consultation: acute hypoxic and hypercapnic respiratory failure, COPD exacerbation   Referring Physician:     Communication with the referring physician will be sent via the electronic medical record. Chief Complaint: possible stroke    CODE STATUS: PRIOR    SUBJECTIVE:  HPI:  Martha Grimes is a 78 y.o. female not previously known to our practice. Patient with altered mental status and no family at bedside. Unable to perform HPI/ROS. Chart reviewed. PMH: recent breast cancer diagnosis, anxiety, pneumonia, pulmonary HTN, tobacco abuse    Patient presented to the ED with concerns of weakness per her son. Developed left-sided weakness with facial droop. Per EMA, 56% on room air, NRB applied. Bipap applied for hypercapnia but patient is combative with it and removed it. Received Solu-medrol 125mg and duoneb treatments. ABGs: 7.23/76.7/202.3/31.6/99.3% on NRB  Today: 7.27/73.9/94.9/33.9/96.1% on 4L NC- placed patient on AVAPS    Patient seen and examined. Laying in bed 100% on 5L NC in NAD. Weaned oxygen to 3L and she remained 100%. Placed on AVAPS 16/400/5/30%, SpO2 100%. Patient in [facility], nursing students at bedside. Patient alert and confused. Agreed to wear NIV once educated but that did not last long. Patient reaching to remove it. Continue BWR. Repeat ABGs 4 hours after having it on.     Past Medical History:   Diagnosis Date    Anxiety     Arthritis     Cancer ([address]     Chronic bronchitis ([address]     Emphysema     according to xray report 2013    Walking pneumonia        Past Surgical History:   Procedure Laterality Date    APPENDECTOMY      INDER US PERQ DEVICE SOFT TISSUE PLMT  FIRST LESION  7/15/2022    INDER US PERQ DEVICE SOFT TISSUE PLMT  FIRST LESION 7/15/2022 [facility]    US BREAST NEEDLE BIOPSY RIGHT Right 7/15/2022    US BREAST NEEDLE BIOPSY RIGHT 7/15/2022 [facility]       Family History   Problem Relation Age of Onset    Cancer Mother         brain    Cancer Father         lung    Lung Cancer Father     Cancer Sister         cervical       Social History:   Social History     Socioeconomic History    Marital status:      Spouse name: Not on file    Number of children: Not on file    Years of education: Not on file    Highest education level: Not on file   Occupational History    Not on file   Tobacco Use    Smoking status: Every Day     Packs/day: 1.00     Types: Cigarettes    Smokeless tobacco: Never   Vaping Use    Vaping Use: Never used   Substance and Sexual Activity    Alcohol use: No    Drug use: No    Sexual activity: Not on file   Other Topics Concern    Not on file   Social History Narrative    Not on file     Social Determinants of Health     Financial Resource Strain: Not on file   Food Insecurity: Not on file   Transportation Needs: Not on file   Physical Activity: Not on file   Stress: Not on file   Social Connections: Not on file   Intimate Partner Violence: Not on file   Housing Stability: Not on file     Smoking history: The patient is a Current smoker     ETOH:   reports no history of alcohol use. Exposures: DAJA d/t mentation      Vaccines: There is no immunization history on file for this patient. Home Meds: Medications Prior to Admission: carvedilol (COREG) 6.25 MG tablet, Take 1 tablet by mouth in the morning and 1 tablet in the evening. Take with meals. furosemide (LASIX) 40 MG tablet, Take 1 tablet by mouth in the morning. potassium chloride (KLOR-CON M) 20 MEQ extended release tablet, Take 2 tablets by mouth in the morning.     CURRENT MEDS :  Scheduled Meds:   aspirin  324 mg Oral Once    sodium zirconium cyclosilicate  5 g Oral TID    enoxaparin  30 mg SubCUTAneous Daily    ipratropium-albuterol  1 ampule Inhalation Q4H WA    aspirin  81 mg Oral Daily    clopidogrel  75 mg Oral Daily    rosuvastatin  20 mg Oral Nightly    methylPREDNISolone  40 mg IntraVENous Q12H    doxycycline hyclate  100 mg Oral 2 times per day       Continuous Infusions:   dextrose 100 mL/hr at 07/26/22 1356    sodium chloride 100 mL/hr at 07/26/22 0437       Allergies   Allergen Reactions    Bee Venom Swelling       REVIEW OF SYSTEMS:  DAJA d/t mentation    OBJECTIVE:   /60   Pulse 50   Temp (!) 95.9 °F (35.5 °C) (Temporal)   Resp 24   Ht 5' 7\" (1.702 m)   Wt 110 lb 14.4 oz (50.3 kg)   SpO2 97%   BMI 17.37 kg/m²   SpO2 Readings from Last 1 Encounters:   07/26/22 97%        I/O:    Intake/Output Summary (Last 24 hours) at 7/26/2022 1535  Last data filed at 7/26/2022 0636  Gross per 24 hour   Intake 175 ml   Output 1000 ml   Net -825 ml         CPAP/EPAP: 5 cmH2O     Physical Exam:  General: The patient is lying in bed comfortably without any distress. Breathing is not labored +cachectic   HEENT: Pupils are equal round and reactive to light, there are no oral lesions and no post-nasal drip   Neck: supple without adenopathy  Cardiovascular: regular rate and rhythm without murmur or gallop  Respiratory: RLL crackles. Air entry is symmetric  Abdomen: soft, non-tender, non-distended, normal bowel sounds  Extremities: warm, no edema, no clubbing  Skin: no rash or lesion  Neurologic: alert, confused, agitated     Pulmonary Function Testing  None on file     Imaging personally reviewed:  CTA pulmonary 7/25  1. Redemonstration of right breast mass with right axillary lymphadenopathy. 2. No evidence of pulmonary embolism. 3. Redemonstration of multiple enlarged mediastinal lymph nodes notable in   lower paratracheal location. 4. Moderate cardiomegaly with findings suggesting right heart failure. 5. Stable atelectatic changes in left lung base.    6. Peribronchial thickening bilaterally suggesting inflammation with sites of   bronchial stenosis notable in posterior segments of the lower lobes. CT brain perfusion 7/25  1. Area of ischemia is seen in the right parietal lobe, corresponding to the   site of edema identified on the CT of the head. CTA OF THE HEAD:       1. No major arterial stenosis is seen in the brain. CTA OF THE NECK:       1. Approximately 70% stenosis in the proximal right ICA. 2. Less than 50% stenosis in the proximal left ICA. 3. Approximately 50% stenosis of the origin of the left vertebral artery. 4. No significant stenosis in the right vertebral artery     CT head WO contrast 7/25  1. LIKELY SUBACUTE INFARCTIONS in the right parietal and left occipital lobes. 2. No hemorrhage, significant mass effect or midline shift. Echo 7/13/22   Summary   Ejection fraction is visually estimated at 50%. Dilated right ventricle and reduced right ventricular function (TAPSE 1.0   cm). Indeterminate diastolic function. Severely dilated right atrium. Mild mitral regurgitation. Mild aortic regurgitation. Moderate tricuspid regurgitation.    RV-RA gradient is estimated at 58 mmHg    Labs:  Lab Results   Component Value Date/Time    WBC 10.4 07/25/2022 02:41 PM    HGB 14.6 07/25/2022 02:41 PM    HCT 48.3 07/25/2022 02:41 PM    MCV 99.6 07/25/2022 02:41 PM    MCH 30.1 07/25/2022 02:41 PM    MCHC 30.2 07/25/2022 02:41 PM    RDW 15.7 07/25/2022 02:41 PM     07/25/2022 02:41 PM    MPV 10.0 07/25/2022 02:41 PM     Lab Results   Component Value Date/Time     07/26/2022 06:10 AM    K 6.0 07/26/2022 06:10 AM    K 6.4 07/25/2022 02:41 PM    CL 99 07/26/2022 06:10 AM    CO2 29 07/26/2022 06:10 AM    BUN 59 07/26/2022 06:10 AM    CREATININE 1.6 07/26/2022 06:10 AM    LABALBU 3.2 07/26/2022 06:10 AM    CALCIUM 8.8 07/26/2022 06:10 AM    GFRAA 39 07/26/2022 06:10 AM    LABGLOM 32 07/26/2022 06:10 AM     Lab Results   Component Value Date/Time    PROTIME 22.5 07/25/2022 02:05 PM    INR 2.0 07/25/2022 02:05 PM Recent Labs     07/25/22  1441   PROBNP 17,428*     No results for input(s): TROPONINI in the last 72 hours. No results for input(s): PROCAL in the last 72 hours. This SmartLink has not been configured with any valid records. Micro:  Nothing obtained     Assessment:  Acute respiratory failure with hypoxia and hypercapnia  Stoke in right middle cerebral artery territory v. Tumor metastatic disease   New breast cancer   DIANN  Hyperkalemia   Emphysema with chronic scarring   Atelectasis   Possible central ELANA  Pulmonary HTN, RVSP 58  Tobacco abuse     Plan:  Wean oxygen as tolerated to keep SpO2 >90%, on 3L NC  AVAPS for 4 hours then repeat ABG to assess hypercapnia, 16/400/5/30%, wean FiO2 as tolerated  Duonebs Q4H WA, add bronchodilators: Pulmicort/Brovana BID  Doxycycline per PCP x5 days  Recommend antianxiety medications- defer  Chest imaging reviewed, (-) PE, emphysema with chronic scarring noted, bibasilar atelectasis  Wean Solu-medrol- 20mg daily x2 days, no wheezing  BNP 17,428- diuresis limited d/t DIANN- currently on IVF  Palliative medicine for CODE STATUS discussion  Neurology for CVA- Plavix and ASA- did not receive tPa. Brain MRI ordered  Hematology/oncology on board  Smoking cessation education       Thank you for allowing me to participate in the care of Doris Lopez. Please feel free to call with questions. This plan of care was reviewed in collaboration with Dr. Oleg Gallagher    Electronically signed by ANGELITA Serna CNP on 7/26/2022 at 3:35 PM      Note: This report was completed utilizing computer voice recognition software. Every effort has been made to ensure accuracy, however; inadvertent computerized transcription errors may be present        Addendum:  Patient is a very poor historian. Had to place her back on the NIV once she was back from her lower extremity Dopplers.   Her acute on chronic hypercapnic respiratory failure most likely is combination of her underlying COPD and probably exacerbated by her recent stroke. We will obtain the ABG after patient has been on NIV at least for 3 to 4 hours. Discussed with respiratory and nursing staff at the bedside. I think given patient's all comorbidities including his recent stroke from breast cancer and her underlying COPD encourage palliative to discuss with family regarding goals of care. I personally saw, examined and provided care for the patient. Radiographs, labs and medication list were reviewed by me independently. I spoke with bedside nursing, therapists and consultants. The case was discussed in detail and plans for care were established. Review of CNP documentation was conducted and revisions were made as appropriate. I agree with the above documented exam, problem list and plan of care.    Manasa Goncalves MD

## 2022-07-26 NOTE — PROGRESS NOTES
Hospitalist Progress Note      SYNOPSIS: Patient admitted on 7/25/2022     64-year-old female patient from home with medical history of severe pulmonary hypertension, tobacco use, right breast cancer, COPD, MDD, hypertension who was brought to the ED with concern for stroke. Patient last known normal well was around midnight last night. Family found her today around noon with left-sided weakness and facial droop. Upon EMS arrival at the scene patient had saturation of 56% on room air and placed on nonrebreather mask. Patient was recently diagnosed with breast cancer 10 days ago for which she was discharged from this facility. No report of fever, cough, CP, abdominal pain, n/v.   Stroke alert was activated on arrival to ED. CT scanning of the head showed subacute infarction described as area of ischemia in the right parietal and left occipital lobes. No hemorrhagic, significant mild static, or midline shift. At CTA of the head and neck obtained with no major intracranial arterial stenosis but 70% stenosis of the right proximal ICA. Telestroke was consulted and patient was out of the window for tPA given completed infarct, also per neuro recommendation patient with likely hypercoagulable state this ICA daily managed medically, with DAPT. Patient was given aspirin on the ED  Remaining ED work-up showing normal white blood cells, hemoglobin, elevated hematocrit, potassium 6.4, bicarb 32, BUN 73, creatinine 2.1, ABG showing pH 7.23, PCO2 76.7, PO2 202, troponin 66, and proBNP 17,428. EKG sinus bradycardia 59, normal QT, no ST segment changes. CTA pulmonary with contrast with no evidence of PE.   Redemonstration of right breast mass with right axillary lymphadenopathy and multiple enlarged mediastinal lymph nodes  Patient will be admitted for further management of subacute stroke, acute hypercapnic and hypoxic respiratory failure     SUBJECTIVE:    Patient seen and examined at bedside, states feels better than yesterday, denies CP, sob, palpitations, n/v   Son at bedside   Patient is adamantly requesting to go home . Yelling   Records reviewed. Stable overnight. No other overnight issues reported. Temp (24hrs), Av.7 °F (36.5 °C), Min:97.1 °F (36.2 °C), Max:98 °F (36.7 °C)    DIET: No diet orders on file  CODE: Prior    Intake/Output Summary (Last 24 hours) at 2022 1033  Last data filed at 2022 0636  Gross per 24 hour   Intake 175 ml   Output 1000 ml   Net -825 ml       OBJECTIVE:    BP (!) 120/57   Pulse 80   Temp 98 °F (36.7 °C) (Temporal)   Resp 18   Ht 5' 7\" (1.702 m)   Wt 110 lb 14.4 oz (50.3 kg)   SpO2 100%   BMI 17.37 kg/m²     General appearance: No apparent distress, appears stated age and cooperative. HEENT: Normal cephalic, atraumatic without obvious deformity. Pupils equal, round, and reactive to light. Extra ocular muscles intact. Conjunctivae/corneas clear. Neck: Supple, with full range of motion. No jugular venous distention. Trachea midline. Respiratory: Creased respiratory effort, on BiPAP, expiratory wheezing, no crackles  Cardiovascular: Bradycardic, no murmur gallops or rubs  Abdomen: Nondistended, normal BS, nontender, no visceromegaly  Musculoskeletal: No clubbing, cyanosis, right foot erythema and edema. Brisk capillary refill. Skin: Normal skin color. No rashes or lesions. Neurologic:  Neurovascularly intact without any focal sensory/motor deficits. Cranial nerves: II-XII intact, grossly non-focal.  Left-sided hemiparesis and left-sided lower facial droop  Psychiatric: Alert and oriented, thought content appropriate, normal insight       ASSESSMENT:    Acute ischemic stroke  Acute hypoxic and hypercapnic respiratory failure  Hyper kalemia  Acute respiratory acidosis  DIANN  Elevated troponin in the setting of acute respiratory failure and a stroke. -unlikely ACS, likely demand ischemia  Recent diagnosis of right breast cancer.   Status post ultrasound-guided core biopsy of the right axillary lymphadenopathy. 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 Cosyntropin stimulation test showed normal cortisol but obtained received steroids on ED     PLAN:      -Brain MRI with and without contrast. Stroke vs brain mets   -Neurology evaluation  -Breathing treatments due neb scheduled, steroids solumedrol 125 mg stat dose ordered f/u by 40 mg q 12 hrs. Doxycycline x 5 days. Pulmonary consult  -BiPAP QHS and daytime as needed   -Continue aspirin. Start Plavix. Statins  -Lovenox for DVT prophylaxis  - IVF, carefully monitor hemodynamics and renal function. Got 1 L NS bolus on ED. Continue NS at 100 cc/ min  - Treat hyperkalemia and monitor K  -Consult oncology for recommendations  -Right lower extremity venous arterial Dopplers  -ABG results from today noted.   Recommend BiPAP for 2 hours and repeat ABG  - Palliative care consult for [address] 65, code status       DISPOSITION: likely home medically cleared     Medications:  REVIEWED DAILY    Infusion Medications    dextrose      sodium chloride 100 mL/hr at 07/26/22 0437     Scheduled Medications    insulin regular  10 Units IntraVENous Once    And    dextrose bolus  250 mL IntraVENous Once    sodium bicarbonate  50 mEq IntraVENous Once    aspirin  324 mg Oral Once    sodium zirconium cyclosilicate  5 g Oral TID    enoxaparin  30 mg SubCUTAneous Daily    ipratropium-albuterol  1 ampule Inhalation Q4H WA    aspirin  81 mg Oral Daily    clopidogrel  75 mg Oral Daily    rosuvastatin  20 mg Oral Nightly    methylPREDNISolone  40 mg IntraVENous Q12H    doxycycline hyclate  100 mg Oral 2 times per day    cosyntropin  250 mcg IntraVENous Once     PRN Meds: glucose, dextrose bolus **OR** dextrose bolus, glucagon (rDNA), dextrose, [COMPLETED] insulin regular **AND** [COMPLETED] dextrose **AND** POCT Glucose **AND** POCT Glucose **AND** dextrose    Labs:     Recent Labs     07/25/22  1441   WBC 10.4   HGB 14.6   HCT 48.3*          Recent Labs     07/25/22  1441 07/25/22  1937 07/26/22  0610     --  140   K 6.4* 5.4* 6.0*   CL 93*  --  99   CO2 32*  --  29   BUN 73*  --  59*   CREATININE 2.1*  --  1.6*   CALCIUM 8.6  --  8.8   PHOS  --  5.0*  --        Recent Labs     07/26/22  0610   PROT 6.2*   ALKPHOS 93   *   *   BILITOT 1.8*       Recent Labs     07/25/22  1405   INR 2.0       Recent Labs     07/25/22  1441   CKTOTAL 31       Chronic labs:    Lab Results   Component Value Date    CHOL 170 07/26/2022    TRIG 121 07/26/2022    HDL 36 07/26/2022    LDLCALC 110 (H) 07/26/2022    TSH 2.510 07/14/2022    INR 2.0 07/25/2022    LABA1C 5.8 (H) 07/26/2022       Radiology: REVIEWED DAILY    +++++++++++++++++++++++++++++++++++++++++++++++++  Jesse Rincon MD  [employer] - [address] [facility], [place]  +++++++++++++++++++++++++++++++++++++++++++++++++  NOTE: This report was transcribed using voice recognition software. Every effort was made to ensure accuracy; however, inadvertent computerized transcription errors may be present.

## 2022-07-26 NOTE — PLAN OF CARE
Problem: Chronic Conditions and Co-morbidities  Goal: Patient's chronic conditions and co-morbidity symptoms are monitored and maintained or improved  7/26/2022 1803 by Shanika Muniz RN  Outcome: Progressing Towards Goal  7/26/2022 0500 by Virginia Encinas RN  Outcome: Progressing Towards Goal     Problem: Discharge Planning  Goal: Discharge to home or other facility with appropriate resources  7/26/2022 1803 by Shanika Muniz RN  Outcome: Progressing Towards Goal  7/26/2022 0500 by Virginia Encinas RN  Outcome: Progressing Towards Goal  Flowsheets (Taken 7/25/2022 2237)  Discharge to home or other facility with appropriate resources:   Identify barriers to discharge with patient and caregiver   Arrange for needed discharge resources and transportation as appropriate     Problem: Confusion  Goal: Confusion, delirium, dementia, or psychosis is improved or at baseline  Description: INTERVENTIONS:  1. Assess for possible contributors to thought disturbance, including medications, impaired vision or hearing, underlying metabolic abnormalities, dehydration, psychiatric diagnoses, and notify attending LIP  2. [place] high risk fall precautions, as indicated  3. Provide frequent short contacts to provide reality reorientation, refocusing and direction  4. Decrease environmental stimuli, including noise as appropriate  5. Monitor and intervene to maintain adequate nutrition, hydration, elimination, sleep and activity  6. If unable to ensure safety without constant attention obtain sitter and review sitter guidelines with assigned personnel  7.  Initiate Psychosocial CNS and Spiritual Care consult, as indicated  7/26/2022 1803 by Shanika Muniz RN  Outcome: Not Progressing Towards Goal  Flowsheets (Taken 7/26/2022 1445)  Effect of thought disturbance (confusion, delirium, dementia, or psychosis) are managed with adequate functional status:   Decrease environmental stimuli, including noise as appropriate   If unable to ensure safety without constant attention obtain sitter and review sitter guidelines with assigned personnel   [place] high risk fall precautions, as indicated  7/26/2022 0500 by Virginia Encinas RN  Outcome: Progressing Towards Goal     Problem: Skin/Tissue Integrity  Goal: Absence of new skin breakdown  Description: 1. Monitor for areas of redness and/or skin breakdown  2. Assess vascular access sites hourly  3. Every 4-6 hours minimum:  Change oxygen saturation probe site  4. Every 4-6 hours:  If on nasal continuous positive airway pressure, respiratory therapy assess nares and determine need for appliance change or resting period. 7/26/2022 1803 by Shanika Muniz RN  Outcome: Progressing Towards Goal  7/26/2022 0500 by Virginia Encinas RN  Outcome: Progressing Towards Goal     Problem: Safety - Adult  Goal: Free from fall injury  7/26/2022 1803 by Shanika Muniz RN  Outcome: Progressing Towards Goal  7/26/2022 0500 by Virginia Encinas RN  Outcome: Progressing Towards Goal     Problem: Safety - Medical Restraint  Goal: Remains free of injury from restraints (Restraint for Interference with Medical Device)  Description: INTERVENTIONS:  1. Determine that other, less restrictive measures have been tried or would not be effective before applying the restraint  2. Evaluate the patient's condition at the time of restraint application  3. Inform patient/family regarding the reason for restraint  4.  Q2H: Monitor safety, psychosocial status, comfort, nutrition and hydration  Outcome: Progressing Towards Goal  Flowsheets (Taken 7/26/2022 1415 by Magali Saleem)  Remains free of injury from restraints (restraint for interference with medical device):   Determine that other, less restrictive measures have been tried or would not be effective before applying the restraint   Evaluate the patient's condition at the time of restraint application   Inform patient/family regarding the reason for restraint Every 2 hours: Monitor safety, psychosocial status, comfort, nutrition and hydration     Problem: Pain  Goal: Verbalizes/displays adequate comfort level or baseline comfort level  Outcome: Progressing Towards Goal     Problem: Confusion  Goal: Confusion, delirium, dementia, or psychosis is improved or at baseline  Description: INTERVENTIONS:  1. Assess for possible contributors to thought disturbance, including medications, impaired vision or hearing, underlying metabolic abnormalities, dehydration, psychiatric diagnoses, and notify attending LIP  2. [place] high risk fall precautions, as indicated  3. Provide frequent short contacts to provide reality reorientation, refocusing and direction  4. Decrease environmental stimuli, including noise as appropriate  5. Monitor and intervene to maintain adequate nutrition, hydration, elimination, sleep and activity  6. If unable to ensure safety without constant attention obtain sitter and review sitter guidelines with assigned personnel  7.  Initiate Psychosocial CNS and Spiritual Care consult, as indicated  7/26/2022 1803 by Ashley Andrade RN  Outcome: Not Progressing Towards Goal  Flowsheets (Taken 7/26/2022 1445)  Effect of thought disturbance (confusion, delirium, dementia, or psychosis) are managed with adequate functional status:   Decrease environmental stimuli, including noise as appropriate   If unable to ensure safety without constant attention obtain sitter and review sitter guidelines with assigned [address] high risk fall precautions, as indicated  7/26/2022 0500 by Estelle Robbins RN  Outcome: Progressing Towards Goal

## 2022-07-26 NOTE — CONSULTS
Department of Medicine  Division of Hematology/Oncology  Attending Consult Note      Requesting Physician:  Jag Gay MD  Reason for Consult:  breast cancer    CHIEF COMPLAINT: Left-sided weakness    History Obtained From: Chart review    HISTORY OF PRESENT ILLNESS:      The patient is a 78 y.o. female with significant past medical history of COPD/emphysema, anxiety, recent right breast mass biopsy and hospitalization who presents from home to the ED with weakness of left side. Patient completed work-up and was admitted with diagnosis(es) of CVA, acute respiratory failure with hypoxia and hypercapnia, DIANN. Benzodiazepines noted on urine drug screen, patient with no history of benzo prescription per [facility]. Patient reports she had anxiety medications at home she took that were prescribed to her a long time ago. Son reported she had xanax at home but was uncertain where sh got it. Her sister is at bedside and is not aware. Patient lives with her son so he has more day-to-day knowledge. Patient was in restraints, on Bipap and extremely confused yesterday. She is out of restraints on O2 via NC. She is more interactive but selectively and currently says she has just been trying to sleep and is tired. She has abdominal pain still. She had a BM yesterday. She has new swelling in the RUE and is going for an ultrasound currently.     Past Medical History:        Diagnosis Date    Anxiety     Arthritis     Cancer ([address]     Chronic bronchitis ([address]     Emphysema     according to xray report 2013    Walking pneumonia        Past Surgical History:        Procedure Laterality Date    APPENDECTOMY      INDER US PERQ DEVICE SOFT TISSUE PLMT  FIRST LESION  7/15/2022    INDER US PERQ DEVICE SOFT TISSUE PLMT  FIRST LESION 7/15/2022 [facility]    US BREAST NEEDLE BIOPSY RIGHT Right 7/15/2022    US BREAST NEEDLE BIOPSY RIGHT 7/15/2022 [facility]       Current Medications:    Current Facility-Administered Medications: glucose chewable tablet 16 g, 4 tablet, Oral, PRN  dextrose bolus 10% 125 mL, 125 mL, IntraVENous, PRN **OR** dextrose bolus 10% 250 mL, 250 mL, IntraVENous, PRN  glucagon (rDNA) injection 1 mg, 1 mg, SubCUTAneous, PRN  dextrose 10 % infusion, , IntraVENous, Continuous PRN  aspirin chewable tablet 324 mg, 324 mg, Oral, Once  [COMPLETED] insulin regular (HUMULIN R;NOVOLIN R) injection 5 Units, 5 Units, IntraVENous, Once **AND** [COMPLETED] dextrose 50 % IV solution, 25 g, IntraVENous, Once **AND** POCT Glucose, , , Q30 Min **AND** POCT Glucose, , , 1 Time **AND** dextrose 50 % IV solution, 25 g, IntraVENous, PRN  sodium zirconium cyclosilicate (LOKELMA) oral suspension 5 g, 5 g, Oral, TID  enoxaparin Sodium (LOVENOX) injection 30 mg, 30 mg, SubCUTAneous, Daily  ipratropium-albuterol (DUONEB) nebulizer solution 1 ampule, 1 ampule, Inhalation, Q4H WA  aspirin EC tablet 81 mg, 81 mg, Oral, Daily  clopidogrel (PLAVIX) tablet 75 mg, 75 mg, Oral, Daily  rosuvastatin (CRESTOR) tablet 20 mg, 20 mg, Oral, Nightly  0.9 % sodium chloride infusion, , IntraVENous, Continuous  methylPREDNISolone sodium (SOLU-MEDROL) injection 40 mg, 40 mg, IntraVENous, Q12H  doxycycline hyclate (VIBRAMYCIN) capsule 100 mg, 100 mg, Oral, 2 times per day    Allergies:  Bee venom    Social History:   Social History     Socioeconomic History    Marital status:      Spouse name: Not on file    Number of children: Not on file    Years of education: Not on file    Highest education level: Not on file   Occupational History    Not on file   Tobacco Use    Smoking status: Every Day     Packs/day: 1.00     Types: Cigarettes    Smokeless tobacco: Never   Vaping Use    Vaping Use: Never used   Substance and Sexual Activity    Alcohol use: No    Drug use: No    Sexual activity: Not on file   Other Topics Concern    Not on file   Social History Narrative    Not on file     Social Determinants of Health     Financial Resource Strain: Not on file   Food Insecurity: Not on file   Transportation Needs: Not on file   Physical Activity: Not on file   Stress: Not on file   Social Connections: Not on file   Intimate Partner Violence: Not on file   Housing Stability: Not on file       Family History:         Problem Relation Age of Onset    Cancer Mother         brain    Cancer Father         lung    Lung Cancer Father     Cancer Sister         cervical       REVIEW OF SYSTEMS:    As per HPI, remaining ROS negative.     PHYSICAL EXAM:      Vitals:  /60   Pulse 50   Temp (!) 95.9 °F (35.5 °C) (Temporal)   Resp 24   Ht 5' 7\" (1.702 m)   Wt 110 lb 14.4 oz (50.3 kg)   SpO2 97%   BMI 17.37 kg/m²     CONSTITUTIONAL:  thin, awake and follows commands at times  HEENT:  left facial droop, O2 via NC, sclera anitcteric  no obvious abnormality, EOMI, sclera clear, conjunctiva normal, mucosa moist without ulcers, erythema, bleeding  NECK:  Supple, full ROM, symmetrical, trachea midline  HEMATOLOGIC/LYMPHATICS:  right axilla fullness but difficult to assess as patient in restraints and moving in bed  LUNGS:  respirations easy and not labored, coarse breath sounds with Bipap present  Breast: right breast with well-healed scar  CARDIOVASCULAR:  Normal apical impulse, regular rate and rhythm, normal S1 and S2, no S3 or S4, and no murmur noted  ABDOMEN:  No scars, normal BS, soft, RUQ seems full and patient complains of discomfort, no guarding or rebound tenderess, non-distended  MUSCULOSKELETAL:  decreased muscle tone of LE, RUE + edema and redness  : sultana  SKIN: intact without petechiae, ecchymoses, rash  NEUROLOGIC:  weakness of left side reported in restrained, Awake, alert, agitated but responds fairly appropriately      DATA:    CMP:    Lab Results   Component Value Date/Time     07/26/2022 06:10 AM    K 6.0 07/26/2022 06:10 AM    K 6.4 07/25/2022 02:41 PM    CL 99 07/26/2022 06:10 AM    CO2 29 07/26/2022 06:10 AM    BUN 59 07/26/2022 06:10 AM    PROT 6.2 07/26/2022 06:10 AM       CBC:    Recent Labs     07/25/22  1441   WBC 10.4   HGB 14.6          Radiology:    US DUP LOWER EXTREMITY RIGHT ARTERIES   Final Result   No evidence of peripheral arterial disease with normal waveforms and   velocities throughout the right lower extremity arterial system. RECOMMENDATIONS:   Unavailable         US DUP LOWER EXTREMITY RIGHT YAMILET   Final Result   No evidence of DVT in the right lower extremity. CT ABDOMEN PELVIS WO CONTRAST Additional Contrast? None   Final Result   1. The overall study is slightly suboptimal without the administration of IV   and oral contrast.      2.  Suspicion for left lower lobe pneumonia. 3.  Cardiomegaly again noted, when compared to the prior CT scan of the   abdomen and pelvis performed 07/15/2022 and CTA of the chest performed 1 day   previously. 4.  Partially seen right breast mass again noted. 5.  Vicarious excretion of IV contrast from the patient's CTA performed 07/25   into the gallbladder. 6.  Relatively bright enhancement of the renal parenchyma bilaterally,   indicating delayed excretion of injected contrast from the CTA performed 1   day prior. IV contrast is noted within the ureters and urinary bladder. The   findings indicate mild renal failure. 7.  Small to moderate amount of abdominal and pelvic ascites. CTA PULMONARY W CONTRAST   Final Result   1. Redemonstration of right breast mass with right axillary lymphadenopathy. 2. No evidence of pulmonary embolism. 3. Redemonstration of multiple enlarged mediastinal lymph nodes notable in   lower paratracheal location. 4. Moderate cardiomegaly with findings suggesting right heart failure. 5. Stable atelectatic changes in left lung base. 6. Peribronchial thickening bilaterally suggesting inflammation with sites of   bronchial stenosis notable in posterior segments of the lower lobes.          CT Head WO Contrast   Final Result CTA NECK W CONTRAST   Final Result   CT PERFUSION OF THE HEAD:      1. Area of ischemia is seen in the right parietal lobe, corresponding to the   site of edema identified on the CT of the head. CTA OF THE HEAD:      1. No major arterial stenosis is seen in the brain. CTA OF THE NECK:      1. Approximately 70% stenosis in the proximal right ICA. 2. Less than 50% stenosis in the proximal left ICA. 3. Approximately 50% stenosis of the origin of the left vertebral artery. 4. No significant stenosis in the right vertebral artery. RECOMMENDATIONS:   Unavailable         CTA HEAD W CONTRAST   Final Result   CT PERFUSION OF THE HEAD:      1. Area of ischemia is seen in the right parietal lobe, corresponding to the   site of edema identified on the CT of the head. CTA OF THE HEAD:      1. No major arterial stenosis is seen in the brain. CTA OF THE NECK:      1. Approximately 70% stenosis in the proximal right ICA. 2. Less than 50% stenosis in the proximal left ICA. 3. Approximately 50% stenosis of the origin of the left vertebral artery. 4. No significant stenosis in the right vertebral artery. RECOMMENDATIONS:   Unavailable         CT BRAIN PERFUSION   Final Result   CT PERFUSION OF THE HEAD:      1. Area of ischemia is seen in the right parietal lobe, corresponding to the   site of edema identified on the CT of the head. CTA OF THE HEAD:      1. No major arterial stenosis is seen in the brain. CTA OF THE NECK:      1. Approximately 70% stenosis in the proximal right ICA. 2. Less than 50% stenosis in the proximal left ICA. 3. Approximately 50% stenosis of the origin of the left vertebral artery. 4. No significant stenosis in the right vertebral artery.       RECOMMENDATIONS:   Unavailable         MRI BRAIN W WO CONTRAST    (Results Pending)       IMPRESSION:   77 yo female with recent breast biopsy positive for invasive poorly differentiated carcinoma with squamous differentiation, ER/HI/HER2 negative, HARSHAD-3 positive suggestive of breat primary, morphology described as spindle cell and keratinizing squamous differentiation. Right axillary node with similar report. CT chest significant for mediastinal adenopathy. Additional diagnoses CHF (BNP 35868), acute hypercapnic respiratory failure, LFTs elevated, urine with moderate bacteria, possible pneumonia, DAINN and agitation. RECOMMENDATIONS:  -Patient has several acute issues that need address but may need additional tissue for diagnosis with presence of mediastinal adenopathy and atypical pathology report. -liver ultrasound, elevated LFTs and pain  -treatment for PNA, ?UTI, per primary  -monitor for benzo withdrawal, uncertain how long patient was taking at home  -MRI ordered per neurology  -hypothermic  -thrombocytopenia, follow ? reactive  -leukocytosis, steroids started likely reactive    Await MRI results. Treat patient's acute issues and will discuss potential treatment options with attending. I will discuss further workup with attending. Thank you for allowing us to participate in the care of Jaci Gómez. Ethel Gleason [facility] [phone]    Electronically signed by ALLA Zavaleta on 7/26/2022 at 3:47 PM    Note: This report was completed using [employer] voiced recognition software. Every effort has been made to ensure accuracy; however, inadvertent computerized transcription errors may be present. Attending Addendum:      Patient seen and examined personally. All pertinent labs and imaging reviewed. Case discussed with PA. Agree with the consult note as above which has been updated to reflect my changes. She has a triple negative poorly differentiated carcinoma of breast with squamous differentiation indicating an aggressive phenotype. She will not be a good candidate for any meaningful therapy given her poor performance status unless meaningful recovery which I am doubtful about. Thank you for allowing the [facility] for [address] to participate in care of your patient.     Corrinne Heft, [address]

## 2022-07-27 ENCOUNTER — APPOINTMENT (OUTPATIENT)
Dept: GENERAL RADIOLOGY | Age: 67
DRG: 064 | End: 2022-07-27
Payer: MEDICARE

## 2022-07-27 ENCOUNTER — APPOINTMENT (OUTPATIENT)
Dept: ULTRASOUND IMAGING | Age: 67
DRG: 064 | End: 2022-07-27
Payer: MEDICARE

## 2022-07-27 LAB
ALBUMIN SERPL-MCNC: 3.9 G/DL (ref 3.5–5.2)
ALP BLD-CCNC: 99 U/L (ref 35–104)
ALT SERPL-CCNC: 289 U/L (ref 0–32)
ANION GAP SERPL CALCULATED.3IONS-SCNC: 12 MMOL/L (ref 7–16)
AST SERPL-CCNC: 89 U/L (ref 0–31)
BASOPHILS ABSOLUTE: 0.01 E9/L (ref 0–0.2)
BASOPHILS RELATIVE PERCENT: 0.1 % (ref 0–2)
BILIRUB SERPL-MCNC: 2.1 MG/DL (ref 0–1.2)
BUN BLDV-MCNC: 41 MG/DL (ref 6–23)
CALCIUM SERPL-MCNC: 9.1 MG/DL (ref 8.6–10.2)
CHLORIDE BLD-SCNC: 96 MMOL/L (ref 98–107)
CO2: 34 MMOL/L (ref 22–29)
CREAT SERPL-MCNC: 0.9 MG/DL (ref 0.5–1)
EOSINOPHILS ABSOLUTE: 0 E9/L (ref 0.05–0.5)
EOSINOPHILS RELATIVE PERCENT: 0 % (ref 0–6)
GFR AFRICAN AMERICAN: >60
GFR NON-AFRICAN AMERICAN: >60 ML/MIN/1.73
GLUCOSE BLD-MCNC: 79 MG/DL (ref 74–99)
HCT VFR BLD CALC: 49.7 % (ref 34–48)
HEMOGLOBIN: 14.9 G/DL (ref 11.5–15.5)
IMMATURE GRANULOCYTES #: 0.05 E9/L
IMMATURE GRANULOCYTES %: 0.4 % (ref 0–5)
LYMPHOCYTES ABSOLUTE: 0.87 E9/L (ref 1.5–4)
LYMPHOCYTES RELATIVE PERCENT: 6.2 % (ref 20–42)
MCH RBC QN AUTO: 29.4 PG (ref 26–35)
MCHC RBC AUTO-ENTMCNC: 30 % (ref 32–34.5)
MCV RBC AUTO: 98 FL (ref 80–99.9)
METER GLUCOSE: 111 MG/DL (ref 74–99)
METER GLUCOSE: 119 MG/DL (ref 74–99)
METER GLUCOSE: 87 MG/DL (ref 74–99)
METER GLUCOSE: 89 MG/DL (ref 74–99)
MONOCYTES ABSOLUTE: 0.88 E9/L (ref 0.1–0.95)
MONOCYTES RELATIVE PERCENT: 6.2 % (ref 2–12)
NEUTROPHILS ABSOLUTE: 12.33 E9/L (ref 1.8–7.3)
NEUTROPHILS RELATIVE PERCENT: 87.1 % (ref 43–80)
PDW BLD-RTO: 15.9 FL (ref 11.5–15)
PLATELET # BLD: 114 E9/L (ref 130–450)
PMV BLD AUTO: 9.8 FL (ref 7–12)
POTASSIUM SERPL-SCNC: 4.7 MMOL/L (ref 3.5–5)
RBC # BLD: 5.07 E12/L (ref 3.5–5.5)
SODIUM BLD-SCNC: 142 MMOL/L (ref 132–146)
TOTAL PROTEIN: 6.7 G/DL (ref 6.4–8.3)
WBC # BLD: 14.1 E9/L (ref 4.5–11.5)

## 2022-07-27 PROCEDURE — 2060000000 HC ICU INTERMEDIATE R&B

## 2022-07-27 PROCEDURE — 6360000002 HC RX W HCPCS

## 2022-07-27 PROCEDURE — 82962 GLUCOSE BLOOD TEST: CPT

## 2022-07-27 PROCEDURE — 2700000000 HC OXYGEN THERAPY PER DAY

## 2022-07-27 PROCEDURE — 6370000000 HC RX 637 (ALT 250 FOR IP): Performed by: STUDENT IN AN ORGANIZED HEALTH CARE EDUCATION/TRAINING PROGRAM

## 2022-07-27 PROCEDURE — 85025 COMPLETE CBC W/AUTO DIFF WBC: CPT

## 2022-07-27 PROCEDURE — 74018 RADEX ABDOMEN 1 VIEW: CPT

## 2022-07-27 PROCEDURE — 94640 AIRWAY INHALATION TREATMENT: CPT

## 2022-07-27 PROCEDURE — 6360000002 HC RX W HCPCS: Performed by: STUDENT IN AN ORGANIZED HEALTH CARE EDUCATION/TRAINING PROGRAM

## 2022-07-27 PROCEDURE — 99232 SBSQ HOSP IP/OBS MODERATE 35: CPT | Performed by: PHYSICIAN ASSISTANT

## 2022-07-27 PROCEDURE — 93971 EXTREMITY STUDY: CPT | Performed by: RADIOLOGY

## 2022-07-27 PROCEDURE — 80053 COMPREHEN METABOLIC PANEL: CPT

## 2022-07-27 PROCEDURE — 87088 URINE BACTERIA CULTURE: CPT

## 2022-07-27 PROCEDURE — 93971 EXTREMITY STUDY: CPT

## 2022-07-27 PROCEDURE — 2580000003 HC RX 258: Performed by: STUDENT IN AN ORGANIZED HEALTH CARE EDUCATION/TRAINING PROGRAM

## 2022-07-27 PROCEDURE — 99231 SBSQ HOSP IP/OBS SF/LOW 25: CPT

## 2022-07-27 PROCEDURE — 94660 CPAP INITIATION&MGMT: CPT

## 2022-07-27 PROCEDURE — 36415 COLL VENOUS BLD VENIPUNCTURE: CPT

## 2022-07-27 RX ORDER — LORAZEPAM 2 MG/ML
1 INJECTION INTRAMUSCULAR EVERY 6 HOURS PRN
Status: DISCONTINUED | OUTPATIENT
Start: 2022-07-27 | End: 2022-07-27

## 2022-07-27 RX ORDER — LORAZEPAM 1 MG/1
1 TABLET ORAL EVERY 6 HOURS PRN
Status: DISCONTINUED | OUTPATIENT
Start: 2022-07-27 | End: 2022-08-04 | Stop reason: HOSPADM

## 2022-07-27 RX ADMIN — Medication 5 MG: at 20:11

## 2022-07-27 RX ADMIN — IPRATROPIUM BROMIDE AND ALBUTEROL SULFATE 1 AMPULE: .5; 2.5 SOLUTION RESPIRATORY (INHALATION) at 12:34

## 2022-07-27 RX ADMIN — ROSUVASTATIN 20 MG: 20 TABLET, FILM COATED ORAL at 20:11

## 2022-07-27 RX ADMIN — SODIUM ZIRCONIUM CYCLOSILICATE 5 G: 10 POWDER, FOR SUSPENSION ORAL at 08:22

## 2022-07-27 RX ADMIN — CEFTRIAXONE 1000 MG: 1 INJECTION, POWDER, FOR SOLUTION INTRAMUSCULAR; INTRAVENOUS at 13:36

## 2022-07-27 RX ADMIN — BUDESONIDE 500 MCG: 0.5 SUSPENSION RESPIRATORY (INHALATION) at 19:50

## 2022-07-27 RX ADMIN — CLOPIDOGREL BISULFATE 75 MG: 75 TABLET ORAL at 08:21

## 2022-07-27 RX ADMIN — IPRATROPIUM BROMIDE AND ALBUTEROL SULFATE 1 AMPULE: .5; 2.5 SOLUTION RESPIRATORY (INHALATION) at 19:50

## 2022-07-27 RX ADMIN — IPRATROPIUM BROMIDE AND ALBUTEROL SULFATE 1 AMPULE: .5; 2.5 SOLUTION RESPIRATORY (INHALATION) at 16:08

## 2022-07-27 RX ADMIN — ASPIRIN 81 MG: 81 TABLET, COATED ORAL at 08:21

## 2022-07-27 RX ADMIN — METHYLPREDNISOLONE SODIUM SUCCINATE 20 MG: 40 INJECTION, POWDER, FOR SOLUTION INTRAMUSCULAR; INTRAVENOUS at 08:21

## 2022-07-27 RX ADMIN — ENOXAPARIN SODIUM 30 MG: 100 INJECTION SUBCUTANEOUS at 08:22

## 2022-07-27 RX ADMIN — DOXYCYCLINE HYCLATE 100 MG: 100 CAPSULE ORAL at 08:21

## 2022-07-27 RX ADMIN — ARFORMOTEROL TARTRATE 15 MCG: 15 SOLUTION RESPIRATORY (INHALATION) at 09:00

## 2022-07-27 RX ADMIN — SODIUM ZIRCONIUM CYCLOSILICATE 5 G: 10 POWDER, FOR SUSPENSION ORAL at 20:11

## 2022-07-27 RX ADMIN — BUDESONIDE 500 MCG: 0.5 SUSPENSION RESPIRATORY (INHALATION) at 09:00

## 2022-07-27 RX ADMIN — ARFORMOTEROL TARTRATE 15 MCG: 15 SOLUTION RESPIRATORY (INHALATION) at 19:50

## 2022-07-27 RX ADMIN — SODIUM ZIRCONIUM CYCLOSILICATE 5 G: 10 POWDER, FOR SUSPENSION ORAL at 13:36

## 2022-07-27 RX ADMIN — IPRATROPIUM BROMIDE AND ALBUTEROL SULFATE 1 AMPULE: .5; 2.5 SOLUTION RESPIRATORY (INHALATION) at 08:59

## 2022-07-27 ASSESSMENT — PAIN SCALES - GENERAL
PAINLEVEL_OUTOF10: 0
PAINLEVEL_OUTOF10: 10
PAINLEVEL_OUTOF10: 0

## 2022-07-27 NOTE — PROGRESS NOTES
enlargement/tenderness/nodules or JVD. Lung: Breath sounds diminished few crackles. Respirations   unlabored. Symmetrical expansion. Heart: RRR, normal S1, S2. No MRG  Abdomen: Soft, NT, ND. BS present x 4 quadrants. No bruit or organomegaly. Extremities: Pedal pulses 2+ symmetric b/l. Extremities normal, no cyanosis, clubbing, or edema. Musculokeletal: No joint swelling, no muscle tenderness. ROM normal in all joints of extremities. Neurologic: Mental status: hypersomnolence improved confusion. Sitter at bedside    Pertinent/ New Labs and Imaging Studies     Imaging Personally Reviewed:      CTA pulmonary 7/25  1. Redemonstration of right breast mass with right axillary lymphadenopathy. 2. No evidence of pulmonary embolism. 3. Redemonstration of multiple enlarged mediastinal lymph nodes notable in   lower paratracheal location. 4. Moderate cardiomegaly with findings suggesting right heart failure. 5. Stable atelectatic changes in left lung base. 6. Peribronchial thickening bilaterally suggesting inflammation with sites of   bronchial stenosis notable in posterior segments of the lower lobes. CT brain perfusion 7/25  1. Area of ischemia is seen in the right parietal lobe, corresponding to the   site of edema identified on the CT of the head. CTA OF THE HEAD:       1. No major arterial stenosis is seen in the brain. CTA OF THE NECK:       1. Approximately 70% stenosis in the proximal right ICA. 2. Less than 50% stenosis in the proximal left ICA. 3. Approximately 50% stenosis of the origin of the left vertebral artery. 4. No significant stenosis in the right vertebral artery      CT head WO contrast 7/25  1. LIKELY SUBACUTE INFARCTIONS in the right parietal and left occipital lobes. 2. No hemorrhage, significant mass effect or midline shift. Echo 7/13/22   Summary   Ejection fraction is visually estimated at 50%.    Dilated right ventricle and reduced right ventricular function (TAPSE 1.0   cm). Indeterminate diastolic function. Severely dilated right atrium. Mild mitral regurgitation. Mild aortic regurgitation. Moderate tricuspid regurgitation. RV-RA gradient is estimated at 58 mmHg      Labs:  Lab Results   Component Value Date/Time    WBC 10.4 07/25/2022 02:41 PM    HGB 14.6 07/25/2022 02:41 PM    HCT 48.3 07/25/2022 02:41 PM    MCV 99.6 07/25/2022 02:41 PM    MCH 30.1 07/25/2022 02:41 PM    MCHC 30.2 07/25/2022 02:41 PM    RDW 15.7 07/25/2022 02:41 PM     07/25/2022 02:41 PM    MPV 10.0 07/25/2022 02:41 PM     Lab Results   Component Value Date/Time     07/27/2022 06:11 AM    K 4.7 07/27/2022 06:11 AM    K 6.4 07/25/2022 02:41 PM    CL 96 07/27/2022 06:11 AM    CO2 34 07/27/2022 06:11 AM    BUN 41 07/27/2022 06:11 AM    CREATININE 0.9 07/27/2022 06:11 AM    LABALBU 3.9 07/27/2022 06:11 AM    CALCIUM 9.1 07/27/2022 06:11 AM    GFRAA >60 07/27/2022 06:11 AM    LABGLOM >60 07/27/2022 06:11 AM     Lab Results   Component Value Date/Time    PROTIME 22.5 07/25/2022 02:05 PM    INR 2.0 07/25/2022 02:05 PM     Recent Labs     07/25/22  1441   PROBNP 17,428*     No results for input(s): PROCAL in the last 72 hours. This SmartLink has not been configured with any valid records. Micro:  No results for input(s): CULTRESP in the last 72 hours. No results for input(s): LABGRAM in the last 72 hours. No results for input(s): LEGUR in the last 72 hours. No results for input(s): STREPNEUMAGU in the last 72 hours. No results for input(s): LP1UAG in the last 72 hours.     Repeat abgs on AVAPS 400 + 5 EPAP Latest Reference Range & Units 7/26/22 17:59   pH, Blood Gas 7.350 - 7.450  7.327 (L)   PCO2 35.0 - 45.0 mmHg 72.3 (HH)   pO2 75.0 - 100.0 mmHg 92.3   HCO3 22.0 - 26.0 mmol/L 37.0 (H)      Assessment:    Acute respiratory failure with hypoxia and hypercapnia  Stoke in right middle cerebral artery territory v. Tumor metastatic disease  New breast cancer  DIANN  Hyperkalemia  Emphysema with chronic scarring  Atelectasis  Possible central ELANA  Pulmonary HTN, RVSP 58  Tobacco abuse       Plan:   Wean oxygen as tolerated to keep SpO2 >90%, on 2 L NC  AVAPS 400 + 5 at HS and alternate during day for respiratory support  Trend abg, with improvement   Duonebs Q4H WA,  bronchodilators: Pulmicort/Brovana BID  Recommend antianxiety medications- defer to primary  Chest CT  imaging reviewed, (-) PE, emphysema with chronic scarring noted, bibasilar atelectasis  To complete Solu-medrol- 20mg daily x2 days  BNP 17,428- diuresis limited d/t DIANN- off IVF  Rocephin for UTI per primary  Palliative medicine for CODE STATUS discussion  Neurology for CVA- Plavix and ASA- did not receive tPa. Brain MRI ordered   Hematology/oncology on board  Smoking cessation education     This plan of care was reviewed in collaboration with Dr. Carla Jones  Electronically signed by ANGELITA Larsen - CNP on 7/27/2022 at 3:31 PM        I personally saw, examined and provided care for the patient. Radiographs, labs and medication list were reviewed by me independently. I spoke with bedside nursing, therapists and consultants. The case was discussed in detail and plans for care were established. Review of CNP documentation was conducted and revisions were made as appropriate. I agree with the above documented exam, problem list and plan of care.    Claudia Parekh MD

## 2022-07-27 NOTE — PROGRESS NOTES
Hospitalist Progress Note      SYNOPSIS: Patient admitted on 7/25/2022     28-year-old female patient from home with medical history of severe pulmonary hypertension, tobacco use, right breast cancer, COPD, MDD, hypertension who was brought to the ED with concern for stroke. Patient last known normal well was around midnight last night. Family found her today around noon with left-sided weakness and facial droop. Upon EMS arrival at the scene patient had saturation of 56% on room air and placed on nonrebreather mask. Patient was recently diagnosed with breast cancer 10 days ago for which she was discharged from this facility. No report of fever, cough, CP, abdominal pain, n/v.   Stroke alert was activated on arrival to ED. CT scanning of the head showed subacute infarction described as area of ischemia in the right parietal and left occipital lobes. No hemorrhagic, significant mild static, or midline shift. At CTA of the head and neck obtained with no major intracranial arterial stenosis but 70% stenosis of the right proximal ICA. Telestroke was consulted and patient was out of the window for tPA given completed infarct, also per neuro recommendation patient with likely hypercoagulable state this ICA daily managed medically, with DAPT. Patient was given aspirin on the ED  Remaining ED work-up showing normal white blood cells, hemoglobin, elevated hematocrit, potassium 6.4, bicarb 32, BUN 73, creatinine 2.1, ABG showing pH 7.23, PCO2 76.7, PO2 202, troponin 66, and proBNP 17,428. EKG sinus bradycardia 59, normal QT, no ST segment changes. CTA pulmonary with contrast with no evidence of PE.   Redemonstration of right breast mass with right axillary lymphadenopathy and multiple enlarged mediastinal lymph nodes  Patient will be admitted for further management of subacute stroke, acute hypercapnic and hypoxic respiratory failure     SUBJECTIVE:    Patient seen and examined at bedside, she is feeling better, denies chest pain, difficulty breathing. Denies abdominal pain however is tender during exam.  Complaining of burning when she urinates however she had a Valencia catheter in place. Denies chills  Denies nausea or vomiting or constipation  Patient has been agitated, has been on soft restraints. However this morning she appears more cooperative and alert  Per nurse report, son have reported that patient has been of the symptoms xanax at least twice a day we will and this is not a prescription medication, son is not aware where patient is getting the supply  Records reviewed. Stable overnight. No other overnight issues reported. Temp (24hrs), Av.5 °F (35.8 °C), Min:95.9 °F (35.5 °C), Max:96.9 °F (36.1 °C)    DIET: ADULT DIET; Regular; Low Sodium (2 gm)  CODE: Prior    Intake/Output Summary (Last 24 hours) at 2022 0856  Last data filed at 2022 1606  Gross per 24 hour   Intake --   Output 900 ml   Net -900 ml       OBJECTIVE:    BP (!) 167/79   Pulse 85   Temp (!) 96.7 °F (35.9 °C) (Temporal)   Resp 16   Ht 5' 7\" (1.702 m)   Wt 110 lb 14.4 oz (50.3 kg)   SpO2 95%   BMI 17.37 kg/m²     General appearance: No apparent distress, appears stated age and cooperative. HEENT: Normal cephalic, atraumatic without obvious deformity. Pupils equal, round, and reactive to light. Extra ocular muscles intact. Conjunctivae/corneas clear. Neck: Supple, with full range of motion. No jugular venous distention. Trachea midline. Respiratory: Creased respiratory effort, on BiPAP, expiratory wheezing, no crackles  Cardiovascular: Bradycardic, no murmur gallops or rubs  Abdomen: Nondistended, normal BS, nontender, no visceromegaly  Musculoskeletal: No clubbing, cyanosis, right foot erythema and edema. Brisk capillary refill. Skin: Normal skin color. No rashes or lesions. Neurologic:  Neurovascularly intact without any focal sensory/motor deficits.  Cranial nerves: II-XII intact, grossly non-focal. Left-sided hemiparesis and left-sided lower facial droop  Psychiatric: Alert and oriented, thought content appropriate, normal insight       ASSESSMENT:    Acute ischemic stroke  Acute hypoxic and hypercapnic respiratory failure  Hyper kalemia  Acute respiratory acidosis  DIANN  Elevated troponin in the setting of acute respiratory failure and a stroke. -unlikely ACS, likely demand ischemia  Recent diagnosis of right breast cancer. Status post ultrasound-guided core biopsy of the right axillary lymphadenopathy. 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 Cosyntropin stimulation test showed normal cortisol but obtained received steroids on ED     PLAN:      -Brain MRI with and without contrast. Stroke vs brain mets   -Neurology evaluation  -Breathing treatments due neb scheduled, steroids solumedrol 125 mg stat dose ordered f/u by 20 mg q 12 hrs. Doxycycline x 5 days. Pulmonary consult  -BiPAP QHS and daytime as needed   -Continue aspirin. Start Plavix. Statins  -Lovenox for DVT prophylaxis  - IVF, carefully monitor hemodynamics and renal function. Got 1 L NS bolus on ED. Continue NS at 100 cc/ min  - Treat hyperkalemia and monitor K  -Consult oncology for recommendations  -Right lower extremity venous arterial Dopplers  -ABG results from today noted. Recommend BiPAP for 2 hours and repeat ABG  - Palliative care consult for Alicia 65, code status  -MRI of brain still pending  -Start ceftriaxone for UTI.   Urine culture sent  -Discontinued IV fluids         DISPOSITION: likely home medically cleared     Medications:  REVIEWED DAILY    Infusion Medications    dextrose 100 mL/hr at 07/26/22 1356    dextrose      dextrose      sodium chloride 100 mL/hr at 07/26/22 0437     Scheduled Medications    methylPREDNISolone  20 mg IntraVENous Daily    budesonide  500 mcg Nebulization BID    Arformoterol Tartrate  15 mcg Nebulization BID    melatonin  5 mg Oral Nightly    aspirin  324 mg Oral Once    sodium zirconium cyclosilicate  5 g Oral TID    enoxaparin  30 mg SubCUTAneous Daily    ipratropium-albuterol  1 ampule Inhalation Q4H WA    aspirin  81 mg Oral Daily    clopidogrel  75 mg Oral Daily    rosuvastatin  20 mg Oral Nightly    doxycycline hyclate  100 mg Oral 2 times per day     PRN Meds: LORazepam, glucose, dextrose bolus **OR** dextrose bolus, glucagon (rDNA), dextrose, glucose, dextrose bolus **OR** dextrose bolus, glucagon (rDNA), dextrose, glucose, dextrose bolus **OR** dextrose bolus, glucagon (rDNA), dextrose, [COMPLETED] insulin regular **AND** [COMPLETED] dextrose **AND** POCT Glucose **AND** POCT Glucose **AND** dextrose    Labs:     Recent Labs     07/25/22  1441   WBC 10.4   HGB 14.6   HCT 48.3*          Recent Labs     07/25/22  1441 07/25/22  1937 07/25/22  1937 07/26/22  0610 07/26/22  1538 07/26/22  1745 07/26/22  2332 07/27/22  0611     --   --  140  --   --   --  142   K 6.4* 5.4*   < > 6.0*   < > 5.3* 4.7 4.7   CL 93*  --   --  99  --   --   --  96*   CO2 32*  --   --  29  --   --   --  34*   BUN 73*  --   --  59*  --   --   --  41*   CREATININE 2.1*  --   --  1.6*  --   --   --  0.9   CALCIUM 8.6  --   --  8.8  --   --   --  9.1   PHOS  --  5.0*  --   --   --   --   --   --     < > = values in this interval not displayed.        Recent Labs     07/26/22  0610 07/27/22  0611   PROT 6.2* 6.7   ALKPHOS 93 99   * 289*   * 89*   BILITOT 1.8* 2.1*       Recent Labs     07/25/22  1405   INR 2.0       Recent Labs     07/25/22  1441   CKTOTAL 31       Chronic labs:    Lab Results   Component Value Date    CHOL 170 07/26/2022    TRIG 121 07/26/2022    HDL 36 07/26/2022    LDLCALC 110 (H) 07/26/2022    TSH 2.510 07/14/2022    INR 2.0 07/25/2022    LABA1C 5.8 (H) 07/26/2022       Radiology: REVIEWED DAILY    +++++++++++++++++++++++++++++++++++++++++++++++++  Do Gusman MD  [facility] - [address] [place] [address]  +++++++++++++++++++++++++++++++++++++++++++++++++  NOTE: This report was transcribed using voice recognition software. Every effort was made to ensure accuracy; however, inadvertent computerized transcription errors may be present.

## 2022-07-27 NOTE — CARE COORDINATION
Here for SOB - Oncology, pulmonology, and neurology consulted. Has been on bipap. Currently on 2 l NC  spo2  97%. Per notes-Suspected R parietal lobe subacute infarct and to a lesser degree area in the L occipital lobe -- given her recent diagnosis of breast CA, cannot r/o mets and MRI brain WWO is pending  cm/sw to follow. Electronically signed by Edwardo Robins RN on 7/27/2022 at 3:24 PM

## 2022-07-27 NOTE — PLAN OF CARE
Patient's chart updated to reflect:      . - HF care plan, HF education points and HF discharge instructions.  -Orders: 2 gram sodium diet, daily weights, I/O.  -PCP and/or Cardiologist appointment to be scheduled within 7 days of hospital discharge.  -History of HF, not primary admission Dx. Patient admitted for treatment of ASSESSMENT:     Acute ischemic stroke  Acute hypoxic and hypercapnic respiratory failure  Hyper kalemia  Acute respiratory acidosis  DIANN  Elevated troponin in the setting of acute respiratory failure and a stroke. -unlikely ACS, likely demand ischemia  Recent diagnosis of right breast cancer. Status post ultrasound-guided core biopsy of the right axillary lymphadenopathy.   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

## 2022-07-27 NOTE — PROGRESS NOTES
[facility]  Neurology Follow up     Date:  7/27/2022  Patient Name:  Maura Brody  YOB: 1955  MRN: [id number]     Assessment  Suspected R parietal lobe subacute infarct and to a lesser degree area in the L occipital lobe -- given her recent diagnosis of breast CA, cannot r/o mets and MRI brain WWO is pending     Plan  F/u MRI brain [facility] FOR COGNITIVE DISORDERS   Pending results of MRI brain, consider having vascular evaluate her BUTCH stenosis, however given her CA diagnosis, not sure she would really be a candidate anyway even if her carotid stenosis is deemed symptomatic  DAPT and statin therapy with goal LDL < 70   Therapy evals   Risk factor modification       History of Present Illness:  Maura Brody is a 78 y.o. female presenting for evaluation of stroke. She presented with L side weakness and numbness. CT head showed a subacute stroke in the R parietal lobe and also hypodensity in the L occipital lobe. CTAs showed ICA stenosis of 70% on the R\, < 50% on the L and 50% of the L vert at the origin. Echo was unrevealing. She was also found to have UTI and has been started on Abx    Recent hx of breast CA    On exam she is b/l wrist restraints. Lethargic. Says \"yes\", but does not follow any commands. Sitter at bedside. No family present. Review of Systems:  Unable due to lethargy and AMS    Allergies: Allergies   Allergen Reactions    Bee Venom Swelling        Physical Examination  Vitals   Vitals:    07/27/22 0415 07/27/22 0741 07/27/22 0800 07/27/22 0905   BP: (!) 152/82 (!) 167/79     Pulse: 68 85     Resp: 22 16     Temp: 96.9 °F (36.1 °C) (!) 96.7 °F (35.9 °C)     TempSrc: Temporal Temporal     SpO2: 94% 95% 95% 95%   Weight:       Height:            General: Patient appears in no acute distress. Awake and oriented x 0  HEENT: Normocephalic, atraumatic  Chest: respiratory effort normal on nasal O2   Extremities/Peripheral vascular: No edema/swelling noted.  No cold limbs --   --    MCV 99.6  --   --   --   --   --   --    MCH 30.1  --   --   --   --   --   --    MCHC 30.2*  --   --   --   --   --   --    RDW 15.7*  --   --   --   --   --   --      --   --   --   --   --   --    MPV 10.0  --   --   --   --   --   --    PH  --    < >  --    < > 7.327*  --   --    PO2  --    < >  --    < > 92.3  --   --    PCO2  --    < >  --    < > 72.3*  --   --    HCO3  --    < >  --    < > 37.0*  --   --    BE  --    < >  --    < > 7.7*  --   --    O2SAT  --    < >  --    < > 96.2  --   --    HDL  --   --  36  --   --   --   --    LDLCALC  --   --  110*  --   --   --   --    LABA1C  --   --  5.8*  --   --   --   --     < > = values in this interval not displayed. Imaging  XR ABDOMEN (KUB) (SINGLE AP VIEW)   Final Result   No active process. Very small amount of residual contrast in the distal   small bowel and right colon. US DUP LOWER EXTREMITY RIGHT ARTERIES   Final Result   No evidence of peripheral arterial disease with normal waveforms and   velocities throughout the right lower extremity arterial system. RECOMMENDATIONS:   Unavailable         US DUP LOWER EXTREMITY RIGHT YAMILET   Final Result   No evidence of DVT in the right lower extremity. CT ABDOMEN PELVIS WO CONTRAST Additional Contrast? None   Final Result   1. The overall study is slightly suboptimal without the administration of IV   and oral contrast.      2.  Suspicion for left lower lobe pneumonia. 3.  Cardiomegaly again noted, when compared to the prior CT scan of the   abdomen and pelvis performed 07/15/2022 and CTA of the chest performed 1 day   previously. 4.  Partially seen right breast mass again noted. 5.  Vicarious excretion of IV contrast from the patient's CTA performed 07/25   into the gallbladder. 6.  Relatively bright enhancement of the renal parenchyma bilaterally,   indicating delayed excretion of injected contrast from the CTA performed 1   day prior.   IV PERFUSION   Final Result   CT PERFUSION OF THE HEAD:      1. Area of ischemia is seen in the right parietal lobe, corresponding to the   site of edema identified on the CT of the head. CTA OF THE HEAD:      1. No major arterial stenosis is seen in the brain. CTA OF THE NECK:      1. Approximately 70% stenosis in the proximal right ICA. 2. Less than 50% stenosis in the proximal left ICA. 3. Approximately 50% stenosis of the origin of the left vertebral artery. 4. No significant stenosis in the right vertebral artery. RECOMMENDATIONS:   Unavailable         MRI BRAIN W WO CONTRAST    (Results Pending)       Echo    Ejection fraction is visually estimated at 50%. Dilated right ventricle and reduced right ventricular function (TAPSE 1.0   cm). Indeterminate diastolic function. Severely dilated right atrium. Mild mitral regurgitation. Mild aortic regurgitation. Moderate tricuspid regurgitation. RV-RA gradient is estimated at 58 mmHg.      I have personally reviewed the following images: CT head     Electronically signed by ALLA Prakash on 7/27/2022 at 1:41 PM

## 2022-07-27 NOTE — PROGRESS NOTES
Pt. Alert, not able to follow simple commands, continues to be restless and pulling at BiPap/oxygen. Will continue bilateral wrist restraints.

## 2022-07-27 NOTE — PROGRESS NOTES
Palliative Care Department  [phone]  Palliative Care Initial Consult  Provider Leigh Mccain, APRN - CNP      PATIENT: Maura Brody  : 1955  MRN: [id number]  ADMISSION DATE: 2022  1:54 PM  Referring Provider: Violet Elizabeth MD    Palliative Medicine was consulted on hospital day 2 for assistance with Goals of care, Code Status Discussion. HPI:     Malcom Kang is a 78 y.o. y/o female with a history of pulmonary hypertension, tobacco use, right breast cancer, COPD, hypertension who presented to [facility] on 2022 with concern for stroke. Per family, patient was experiencing strokelike symptoms, upon EMS arrival patient was found to be hypoxic with oxygen saturation of 56% on room air, she was placed under nonrebreather mask, currently on following nasal cannula. CT scan shows subacute infarction with area of ischemia in the right parietal and left occipital lobes. CT of the head and neck shows 70% stenosis of the right proximal ICA. Patient was out of the window for tPA, aspirin was given in the emergency room. Oncology, pulmonology, and neurology consulted. Palliative medicine consulted to discuss goal of care and CODE STATUS discussion. ASSESSMENT/PLAN:     Pertinent Hospital Diagnoses     Acute ischemic stroke  Acute hypoxic and hypercapnic respiratory failure  Acute kidney injury    Palliative Care Encounter / Counseling Regarding Goals of Care  Please see detailed goals of care discussion as below  At this time, Maura Brody, Does Not have capacity for medical decision-making. Capacity is time limited and situation/question specific  During encounter no one was surrogate medical decision-maker  Outcome of goals of care meeting:   Will depends upon making contact with family  Patient's  contact India Ralph, phone number is incorrect  Patient's son Bess Craven left voicemail to call us back  Code status Full Code  Advanced Directives: no POA or living will in epic  Surrogate/Legal NOK:  Jihan Busby ([phone]  Suzanne Shah (Child) [phone]     Spiritual assessment: no spiritual distress identified  Bereavement and grief: to be determined  Referrals to: none today    Thank you for the opportunity to participate in the care of Zara Fuentes. ANGELITA Hinojosa CNP  Palliative Medicine     SUBJECTIVE:     Details of Conversation:     Chart reviewed. Saw patient at the bedside. Sitter present in the room. No family members were present. Spoke with vince, who reports patient's son visited her earlier today, however he had left. Call patient's sons Alisha Martinez over the phone left voicemail to call us back. Will attempt to call him back at a later time    Prognosis: Guarded    OBJECTIVE:     BP (!) 167/79   Pulse 85   Temp (!) 96.7 °F (35.9 °C) (Temporal)   Resp 16   Ht 5' 7\" (1.702 m)   Wt 110 lb 14.4 oz (50.3 kg)   SpO2 95%   BMI 17.37 kg/m²     Physical Examination: Per chart reviewed  Gen: elderly, thin, NAD, awake, alert   HEENT: normocephalic, atraumatic, PERRL, EOMI,   Neck: trachea midline, no JVD  Lungs: Expiratory wheezes, on BiPAP   heart: regular rate and rhythm, distant heart tones,   Abdomen: normoactive bowel sounds, soft, non-tender  Extremities: Right foot erythema and edema   skin: warm, dry without rashes, lesions, bruising  Neuro: Neurovascularly intact without any focal sensory/motor deficits. Cranial nerves: II-XII intact, grossly non-focal.  Left-sided hemiparesis and left-sided lower facial droop    Objective data reviewed: labs, images, records, medication use, vitals, and chart    Time/Communication  Greater than 50% of time spent, total 15 minutes in counseling and coordination of care at the bedside regarding  CODE STATUS discussion and goals of care. Thank you for allowing Palliative Medicine to participate in the care of Zara Fuentes.     Note: This report was completed using computerCarma voiced recognition software. Every effort has been made to ensure accuracy; however, inadvertent computerized transcription errors may be present.

## 2022-07-27 NOTE — PLAN OF CARE
Problem: Safety - Medical Restraint  Goal: Remains free of injury from restraints (Restraint for Interference with Medical Device)  Description: INTERVENTIONS:  1. Determine that other, less restrictive measures have been tried or would not be effective before applying the restraint  2. Evaluate the patient's condition at the time of restraint application  3. Inform patient/family regarding the reason for restraint  4.  Q2H: Monitor safety, psychosocial status, comfort, nutrition and hydration  Outcome: Not Progressing Towards Goal

## 2022-07-27 NOTE — PROGRESS NOTES
Date: 7/26/2022    Time: 9:44 PM    Patient Placed On BIPAP/CPAP/ Non-Invasive Ventilation? Yes    If no must comment. Facial area red/color change? Yes           If YES are Blister/Lesion present? No   If yes must notify nursing staff  BIPAP/CPAP skin barrier?   Yes    Skin barrier type:mepilexlite         Comments:slight redness on bridge of nose        Govind Rios RCP

## 2022-07-28 ENCOUNTER — APPOINTMENT (OUTPATIENT)
Dept: GENERAL RADIOLOGY | Age: 67
DRG: 064 | End: 2022-07-28
Payer: MEDICARE

## 2022-07-28 ENCOUNTER — APPOINTMENT (OUTPATIENT)
Dept: MRI IMAGING | Age: 67
DRG: 064 | End: 2022-07-28
Payer: MEDICARE

## 2022-07-28 LAB
ALBUMIN SERPL-MCNC: 3.1 G/DL (ref 3.5–5.2)
ALP BLD-CCNC: 77 U/L (ref 35–104)
ALT SERPL-CCNC: 175 U/L (ref 0–32)
ANION GAP SERPL CALCULATED.3IONS-SCNC: 7 MMOL/L (ref 7–16)
AST SERPL-CCNC: 43 U/L (ref 0–31)
B.E.: 12.2 MMOL/L (ref -3–3)
BASOPHILS ABSOLUTE: 0.01 E9/L (ref 0–0.2)
BASOPHILS RELATIVE PERCENT: 0.1 % (ref 0–2)
BILIRUB SERPL-MCNC: 1.2 MG/DL (ref 0–1.2)
BUN BLDV-MCNC: 37 MG/DL (ref 6–23)
CALCIUM SERPL-MCNC: 8.5 MG/DL (ref 8.6–10.2)
CHLORIDE BLD-SCNC: 95 MMOL/L (ref 98–107)
CO2: 38 MMOL/L (ref 22–29)
COHB: 1.7 % (ref 0–1.5)
CREAT SERPL-MCNC: 0.9 MG/DL (ref 0.5–1)
CRITICAL: ABNORMAL
DATE ANALYZED: ABNORMAL
DATE OF COLLECTION: ABNORMAL
EOSINOPHILS ABSOLUTE: 0 E9/L (ref 0.05–0.5)
EOSINOPHILS RELATIVE PERCENT: 0 % (ref 0–6)
GFR AFRICAN AMERICAN: >60
GFR NON-AFRICAN AMERICAN: >60 ML/MIN/1.73
GLUCOSE BLD-MCNC: 121 MG/DL (ref 74–99)
HCO3: 40.4 MMOL/L (ref 22–26)
HCT VFR BLD CALC: 49.1 % (ref 34–48)
HEMOGLOBIN: 15 G/DL (ref 11.5–15.5)
HHB: 1.7 % (ref 0–5)
IMMATURE GRANULOCYTES #: 0.04 E9/L
IMMATURE GRANULOCYTES %: 0.3 % (ref 0–5)
LAB: ABNORMAL
LYMPHOCYTES ABSOLUTE: 0.83 E9/L (ref 1.5–4)
LYMPHOCYTES RELATIVE PERCENT: 6.9 % (ref 20–42)
Lab: ABNORMAL
MCH RBC QN AUTO: 29.8 PG (ref 26–35)
MCHC RBC AUTO-ENTMCNC: 30.5 % (ref 32–34.5)
MCV RBC AUTO: 97.6 FL (ref 80–99.9)
METER GLUCOSE: 109 MG/DL (ref 74–99)
METER GLUCOSE: 120 MG/DL (ref 74–99)
METER GLUCOSE: 141 MG/DL (ref 74–99)
METER GLUCOSE: 184 MG/DL (ref 74–99)
METHB: 0.4 % (ref 0–1.5)
MODE: ABNORMAL
MONOCYTES ABSOLUTE: 0.83 E9/L (ref 0.1–0.95)
MONOCYTES RELATIVE PERCENT: 6.9 % (ref 2–12)
NEUTROPHILS ABSOLUTE: 10.26 E9/L (ref 1.8–7.3)
NEUTROPHILS RELATIVE PERCENT: 85.8 % (ref 43–80)
O2 SATURATION: 97.8 % (ref 92–98.5)
O2HB: 96.2 % (ref 94–97)
OPERATOR ID: 1222
PATIENT TEMP: 37 C
PCO2: 65.8 MMHG (ref 35–45)
PDW BLD-RTO: 15.9 FL (ref 11.5–15)
PH BLOOD GAS: 7.41 (ref 7.35–7.45)
PLATELET # BLD: 107 E9/L (ref 130–450)
PMV BLD AUTO: 10 FL (ref 7–12)
PO2: 108.4 MMHG (ref 75–100)
POTASSIUM SERPL-SCNC: 4.33 MMOL/L (ref 3.5–5)
POTASSIUM SERPL-SCNC: 4.5 MMOL/L (ref 3.5–5)
RBC # BLD: 5.03 E12/L (ref 3.5–5.5)
SODIUM BLD-SCNC: 140 MMOL/L (ref 132–146)
SOURCE, BLOOD GAS: ABNORMAL
THB: 16 G/DL (ref 11.5–16.5)
TIME ANALYZED: 1003
TOTAL PROTEIN: 5.5 G/DL (ref 6.4–8.3)
WBC # BLD: 12 E9/L (ref 4.5–11.5)

## 2022-07-28 PROCEDURE — 36600 WITHDRAWAL OF ARTERIAL BLOOD: CPT

## 2022-07-28 PROCEDURE — 6370000000 HC RX 637 (ALT 250 FOR IP): Performed by: STUDENT IN AN ORGANIZED HEALTH CARE EDUCATION/TRAINING PROGRAM

## 2022-07-28 PROCEDURE — 2700000000 HC OXYGEN THERAPY PER DAY

## 2022-07-28 PROCEDURE — 6360000002 HC RX W HCPCS

## 2022-07-28 PROCEDURE — 70553 MRI BRAIN STEM W/O & W/DYE: CPT

## 2022-07-28 PROCEDURE — 71045 X-RAY EXAM CHEST 1 VIEW: CPT

## 2022-07-28 PROCEDURE — 6360000002 HC RX W HCPCS: Performed by: STUDENT IN AN ORGANIZED HEALTH CARE EDUCATION/TRAINING PROGRAM

## 2022-07-28 PROCEDURE — 82962 GLUCOSE BLOOD TEST: CPT

## 2022-07-28 PROCEDURE — 6360000004 HC RX CONTRAST MEDICATION: Performed by: RADIOLOGY

## 2022-07-28 PROCEDURE — 80053 COMPREHEN METABOLIC PANEL: CPT

## 2022-07-28 PROCEDURE — 94640 AIRWAY INHALATION TREATMENT: CPT

## 2022-07-28 PROCEDURE — 2580000003 HC RX 258: Performed by: STUDENT IN AN ORGANIZED HEALTH CARE EDUCATION/TRAINING PROGRAM

## 2022-07-28 PROCEDURE — 84132 ASSAY OF SERUM POTASSIUM: CPT

## 2022-07-28 PROCEDURE — 85025 COMPLETE CBC W/AUTO DIFF WBC: CPT

## 2022-07-28 PROCEDURE — 99232 SBSQ HOSP IP/OBS MODERATE 35: CPT | Performed by: PHYSICIAN ASSISTANT

## 2022-07-28 PROCEDURE — 82805 BLOOD GASES W/O2 SATURATION: CPT

## 2022-07-28 PROCEDURE — 2060000000 HC ICU INTERMEDIATE R&B

## 2022-07-28 PROCEDURE — 36415 COLL VENOUS BLD VENIPUNCTURE: CPT

## 2022-07-28 PROCEDURE — 94660 CPAP INITIATION&MGMT: CPT

## 2022-07-28 PROCEDURE — A9577 INJ MULTIHANCE: HCPCS | Performed by: RADIOLOGY

## 2022-07-28 RX ADMIN — BUDESONIDE 500 MCG: 0.5 SUSPENSION RESPIRATORY (INHALATION) at 09:07

## 2022-07-28 RX ADMIN — ENOXAPARIN SODIUM 30 MG: 100 INJECTION SUBCUTANEOUS at 08:29

## 2022-07-28 RX ADMIN — ROSUVASTATIN 20 MG: 20 TABLET, FILM COATED ORAL at 20:33

## 2022-07-28 RX ADMIN — ARFORMOTEROL TARTRATE 15 MCG: 15 SOLUTION RESPIRATORY (INHALATION) at 21:45

## 2022-07-28 RX ADMIN — IPRATROPIUM BROMIDE AND ALBUTEROL SULFATE 1 AMPULE: .5; 2.5 SOLUTION RESPIRATORY (INHALATION) at 12:33

## 2022-07-28 RX ADMIN — LORAZEPAM 1 MG: 1 TABLET ORAL at 00:30

## 2022-07-28 RX ADMIN — ASPIRIN 81 MG: 81 TABLET, COATED ORAL at 08:28

## 2022-07-28 RX ADMIN — BUDESONIDE 500 MCG: 0.5 SUSPENSION RESPIRATORY (INHALATION) at 21:45

## 2022-07-28 RX ADMIN — Medication 5 MG: at 20:33

## 2022-07-28 RX ADMIN — ARFORMOTEROL TARTRATE 15 MCG: 15 SOLUTION RESPIRATORY (INHALATION) at 09:07

## 2022-07-28 RX ADMIN — LORAZEPAM 1 MG: 1 TABLET ORAL at 20:33

## 2022-07-28 RX ADMIN — GADOBENATE DIMEGLUMINE 10 ML: 529 INJECTION, SOLUTION INTRAVENOUS at 16:03

## 2022-07-28 RX ADMIN — IPRATROPIUM BROMIDE AND ALBUTEROL SULFATE 1 AMPULE: .5; 2.5 SOLUTION RESPIRATORY (INHALATION) at 16:23

## 2022-07-28 RX ADMIN — METHYLPREDNISOLONE SODIUM SUCCINATE 20 MG: 40 INJECTION, POWDER, FOR SOLUTION INTRAMUSCULAR; INTRAVENOUS at 08:29

## 2022-07-28 RX ADMIN — CLOPIDOGREL BISULFATE 75 MG: 75 TABLET ORAL at 08:28

## 2022-07-28 RX ADMIN — IPRATROPIUM BROMIDE AND ALBUTEROL SULFATE 1 AMPULE: .5; 2.5 SOLUTION RESPIRATORY (INHALATION) at 09:08

## 2022-07-28 RX ADMIN — IPRATROPIUM BROMIDE AND ALBUTEROL SULFATE 1 AMPULE: .5; 2.5 SOLUTION RESPIRATORY (INHALATION) at 21:45

## 2022-07-28 RX ADMIN — CEFTRIAXONE 1000 MG: 1 INJECTION, POWDER, FOR SOLUTION INTRAMUSCULAR; INTRAVENOUS at 13:51

## 2022-07-28 ASSESSMENT — PAIN SCALES - GENERAL
PAINLEVEL_OUTOF10: 0
PAINLEVEL_OUTOF10: 0

## 2022-07-28 NOTE — PROGRESS NOTES
Subjective:  Chart reviewed  Patient seen at bedside  She is awake and alert today  Sister-in-law at beside  The patient is awake and alert. Denies chest pain, angina, dyspnea, abdominal discomfort, nausea/vomiting and diarrhea/constipation. Objective:    BP (!) 132/57   Pulse 52   Temp 97.2 °F (36.2 °C) (Temporal)   Resp 18   Ht 5' 7\" (1.702 m)   Wt 114 lb (51.7 kg)   SpO2 97%   BMI 17.85 kg/m²     General: NAD, awake and alert, thin  HEENT: left facial droop, mucosa dry without ulcerations,thrush or mucositis, EOMI, sclera anicteric, conjuntiva pink  NECK: supple  Heart:  RRR, no murmurs, gallops, or rubs.   Lungs:  respirations easy and nonlabored on O2 via NC  Abd: BS present, nontender, nondistended, no masses  Extrem:  RUE mild edema  Skin: Intact, no petechia or purpura  Neruo: left arm and leg weakness    CBC with Differential:    Lab Results   Component Value Date/Time    WBC 14.1 07/27/2022 02:59 PM    RBC 5.07 07/27/2022 02:59 PM    HGB 14.9 07/27/2022 02:59 PM    HCT 49.7 07/27/2022 02:59 PM     07/27/2022 02:59 PM    MCV 98.0 07/27/2022 02:59 PM    MCH 29.4 07/27/2022 02:59 PM    MCHC 30.0 07/27/2022 02:59 PM    RDW 15.9 07/27/2022 02:59 PM    NRBC 0.9 07/12/2022 05:54 PM    LYMPHOPCT 6.2 07/27/2022 02:59 PM    MONOPCT 6.2 07/27/2022 02:59 PM    BASOPCT 0.1 07/27/2022 02:59 PM    MONOSABS 0.88 07/27/2022 02:59 PM    LYMPHSABS 0.87 07/27/2022 02:59 PM    EOSABS 0.00 07/27/2022 02:59 PM    BASOSABS 0.01 07/27/2022 02:59 PM     CMP:    Lab Results   Component Value Date/Time     07/28/2022 06:05 AM    K 4.33 07/28/2022 10:03 AM    K 6.4 07/25/2022 02:41 PM    CL 95 07/28/2022 06:05 AM    CO2 38 07/28/2022 06:05 AM    BUN 37 07/28/2022 06:05 AM    CREATININE 0.9 07/28/2022 06:05 AM    GFRAA >60 07/28/2022 06:05 AM    LABGLOM >60 07/28/2022 06:05 AM    GLUCOSE 121 07/28/2022 06:05 AM    PROT 5.5 07/28/2022 06:05 AM    LABALBU 3.1 07/28/2022 06:05 AM    CALCIUM 8.5 07/28/2022 06:05 AM    BILITOT 1.2 07/28/2022 06:05 AM    ALKPHOS 77 07/28/2022 06:05 AM    AST 43 07/28/2022 06:05 AM     07/28/2022 06:05 AM          Current Facility-Administered Medications:     LORazepam (ATIVAN) tablet 1 mg, 1 mg, Oral, Q6H PRN, Violet Elizabeth MD, 1 mg at 07/28/22 0030    cefTRIAXone (ROCEPHIN) 1,000 mg in sterile water 10 mL IV syringe, 1,000 mg, IntraVENous, Q24H, Violet Elizabeth MD, 1,000 mg at 07/28/22 1351    glucose chewable tablet 16 g, 4 tablet, Oral, PRN, Violet Elizabeth MD    dextrose bolus 10% 125 mL, 125 mL, IntraVENous, PRN **OR** dextrose bolus 10% 250 mL, 250 mL, IntraVENous, PRN, Violet Elizabeth MD    glucagon (rDNA) injection 1 mg, 1 mg, SubCUTAneous, PRN, Violet Elizabeth MD    dextrose 10 % infusion, , IntraVENous, Continuous PRN, Violet Elizabeth MD, Last Rate: 100 mL/hr at 07/26/22 1356, New Bag at 07/26/22 1356    budesonide (PULMICORT) nebulizer suspension 500 mcg, 500 mcg, Nebulization, BID, ANGELITA Díaz - CNP, 500 mcg at 07/28/22 0907    Arformoterol Tartrate (BROVANA) nebulizer solution 15 mcg, 15 mcg, Nebulization, BID, ANGELITA Díaz CNP, 15 mcg at 07/28/22 2222    melatonin disintegrating tablet 5 mg, 5 mg, Oral, Nightly, Violet Elizabeth MD, 5 mg at 07/27/22 2011    glucose chewable tablet 16 g, 4 tablet, Oral, PRN, Violet Elizabeth MD    dextrose bolus 10% 125 mL, 125 mL, IntraVENous, PRN **OR** dextrose bolus 10% 250 mL, 250 mL, IntraVENous, PRN, Violet Elizabeth MD    glucagon (rDNA) injection 1 mg, 1 mg, SubCUTAneous, PRN, Violet Elizabeth MD    dextrose 10 % infusion, , IntraVENous, Continuous PRN, Violet Elizabeth MD    glucose chewable tablet 16 g, 4 tablet, Oral, PRN, Britany Hernandez MD    dextrose bolus 10% 125 mL, 125 mL, IntraVENous, PRN **OR** dextrose bolus 10% 250 mL, 250 mL, IntraVENous, PRN, Britany Hernandez MD    glucagon (rDNA) injection 1 mg, 1 mg, SubCUTAneous, PRN, Joseluis Mckenna MD dextrose 10 % infusion, , IntraVENous, Continuous PRN, Britany Hernandez MD    aspirin chewable tablet 324 mg, 324 mg, Oral, Once, [employer], DO    [COMPLETED] insulin regular (HUMULIN R;NOVOLIN R) injection 5 Units, 5 Units, IntraVENous, Once, 5 Units at 07/25/22 1708 **AND** [COMPLETED] dextrose 50 % IV solution, 25 g, IntraVENous, Once, 25 g at 07/25/22 1708 **AND** POCT Glucose, , , Q30 Min **AND** POCT Glucose, , , 1 Time **AND** dextrose 50 % IV solution, 25 g, IntraVENous, PRN, [employer], DO    enoxaparin Sodium (LOVENOX) injection 30 mg, 30 mg, SubCUTAneous, Daily, Laurie Pepe MD, 30 mg at 07/28/22 2609    ipratropium-albuterol (DUONEB) nebulizer solution 1 ampule, 1 ampule, Inhalation, Q4H WA, Laurie Pepe MD, 1 ampule at 07/28/22 1233    aspirin EC tablet 81 mg, 81 mg, Oral, Daily, Laurie Pepe MD, 81 mg at 07/28/22 2319    clopidogrel (PLAVIX) tablet 75 mg, 75 mg, Oral, Daily, Laurie Pepe MD, 75 mg at 07/28/22 3981    rosuvastatin (CRESTOR) tablet 20 mg, 20 mg, Oral, Nightly, Laurie Pepe MD, 20 mg at 07/27/22 2011    XR CHEST PORTABLE   Final Result   Likely scarring or chronic atelectasis at the left lower lobe. Cardiomegaly. Obstructive airways disease. US DUP UPPER EXTREMITY RIGHT VENOUS   Final Result   Within the visualized vessels there is no evidence for deep venous   thrombosis               XR ABDOMEN (KUB) (SINGLE AP VIEW)   Final Result   No active process. Very small amount of residual contrast in the distal   small bowel and right colon. US DUP LOWER EXTREMITY RIGHT ARTERIES   Final Result   No evidence of peripheral arterial disease with normal waveforms and   velocities throughout the right lower extremity arterial system. RECOMMENDATIONS:   Unavailable         US DUP LOWER EXTREMITY RIGHT YAMILET   Final Result   No evidence of DVT in the right lower extremity.          CT ABDOMEN PELVIS WO CONTRAST Additional Contrast? None   Final Result   1. The overall study is slightly suboptimal without the administration of IV   and oral contrast.      2.  Suspicion for left lower lobe pneumonia. 3.  Cardiomegaly again noted, when compared to the prior CT scan of the   abdomen and pelvis performed 07/15/2022 and CTA of the chest performed 1 day   previously. 4.  Partially seen right breast mass again noted. 5.  Vicarious excretion of IV contrast from the patient's CTA performed 07/25   into the gallbladder. 6.  Relatively bright enhancement of the renal parenchyma bilaterally,   indicating delayed excretion of injected contrast from the CTA performed 1   day prior. IV contrast is noted within the ureters and urinary bladder. The   findings indicate mild renal failure. 7.  Small to moderate amount of abdominal and pelvic ascites. CTA PULMONARY W CONTRAST   Final Result   1. Redemonstration of right breast mass with right axillary lymphadenopathy. 2. No evidence of pulmonary embolism. 3. Redemonstration of multiple enlarged mediastinal lymph nodes notable in   lower paratracheal location. 4. Moderate cardiomegaly with findings suggesting right heart failure. 5. Stable atelectatic changes in left lung base. 6. Peribronchial thickening bilaterally suggesting inflammation with sites of   bronchial stenosis notable in posterior segments of the lower lobes. CT Head WO Contrast   Final Result      CTA NECK W CONTRAST   Final Result   CT PERFUSION OF THE HEAD:      1. Area of ischemia is seen in the right parietal lobe, corresponding to the   site of edema identified on the CT of the head. CTA OF THE HEAD:      1. No major arterial stenosis is seen in the brain. CTA OF THE NECK:      1. Approximately 70% stenosis in the proximal right ICA. 2. Less than 50% stenosis in the proximal left ICA. 3. Approximately 50% stenosis of the origin of the left vertebral artery.    4. No significant phenotype.  -thrombocytopenia, check CBC diff lt  Patient appears with metastatic disease with local and distant adenopathy. Patient reportedly was doing ok just weeks before her recent diagnosis. She reports she has been thin her entire life. We will await the MRI. She will not be a good candidate for any meaningful therapy given her poor performance status unless meaningful recovery. Patient was agreeable to completing treatment for acute issues and seeing how her performance status improves and following up outpatient in a couples weeks with Dr. Coty Hayes. We will continue to follow supportively. Thank you for allowing us to participate in the care of Chiqui Zarate. Mary Gonzales PA-C  [phone]    Electronically signed by ALLA Edwards on 7/28/2022 at 3:37 PM    Note: This report was completed using [employer] voiced recognition software. Every effort has been made to ensure accuracy; however, inadvertent computerized transcription errors may be present.

## 2022-07-28 NOTE — PLAN OF CARE
Problem: Chronic Conditions and Co-morbidities  Goal: Patient's chronic conditions and co-morbidity symptoms are monitored and maintained or improved  Outcome: Progressing     Problem: Confusion  Goal: Confusion, delirium, dementia, or psychosis is improved or at baseline  Outcome: Progressing     Problem: Safety - Adult  Goal: Free from fall injury  Outcome: Progressing

## 2022-07-28 NOTE — PROGRESS NOTES
Attempting to complete MRI checklist . Unable to reach family member at this time, left phone message to return my phone call. Patient remains confused and unable to answer question accurately.

## 2022-07-28 NOTE — CARE COORDINATION
Per notes- Suspected R parietal lobe subacute infarct and to a lesser degree area in the L occipital lobe -- given her recent diagnosis of breast CA, cannot r/o mets and MRI brain WWO is pending. Neurology and pulmonology following. Spoke with son Bess Craven today to discuss discharge plan. Emailed him SNF List for [facility] although he is voicing that he wants to bring her home. Continues on bipap. Await MRI and pt/ot evals to assist with discharge plan. cm/sw to follow. Electronically signed by Jose Alanis RN on 7/28/2022 at 1:05 PM

## 2022-07-28 NOTE — PROGRESS NOTES
Palliative Care Department  [phone]  Palliative Care Progress Note  Provider Kira Olsen, APRN - CNP      PATIENT: Martha Grimes  : 1955  MRN: [id number]  ADMISSION DATE: 2022  1:54 PM  Referring Provider: Belinda Pino MD    Palliative Medicine was consulted on hospital day 3 for assistance with Goals of care, Code Status Discussion. HPI:     Danyelle Chambers is a 78 y.o. y/o female with a history of pulmonary hypertension, tobacco use, right breast cancer, COPD, hypertension who presented to [facility]) on 2022 with concern for stroke. Per family, patient was experiencing strokelike symptoms, upon EMS arrival patient was found to be hypoxic with oxygen saturation of 56% on room air, she was placed under nonrebreather mask, currently on following nasal cannula. CT scan shows subacute infarction with area of ischemia in the right parietal and left occipital lobes. CT of the head and neck shows 70% stenosis of the right proximal ICA. Patient was out of the window for tPA, aspirin was given in the emergency room. Oncology, pulmonology, and neurology consulted. Palliative medicine consulted to discuss goal of care and CODE STATUS discussion. ASSESSMENT/PLAN:     Pertinent Hospital Diagnoses     Breast CA; Biopsy positive for invasive poorly differentiated carcinoma with squamous differentiation  CVA    Palliative Care Encounter / Counseling Regarding Goals of Care  Please see detailed goals of care discussion as below  At this time, Martha Grimes, Does Not have capacity for medical decision-making.  Capacity is time limited and situation/question specific  During encounter no one was surrogate medical decision-maker  Outcome of goals of care meeting:  Await MRI brain  Awaiting call back from son  Continue current medical management   Code status Full Code  Advanced Directives: no POA or living will in [facility]  Surrogate/Legal NOK:  Karen Valentine (Child) regular rate and rhythm, distant heart tones,   Abdomen: normoactive bowel sounds, soft, non-tender  Extremities: Right foot erythema and edema   skin: warm, dry without rashes, lesions, bruising  Neuro: Left-sided hemiparesis and left-sided lower facial droop    Objective data reviewed: labs, images, records, medication use, vitals, and chart    Time/Communication  Greater than 50% of time spent, total 15 minutes in counseling and coordination of care at the bedside regarding  CODE STATUS discussion and goals of care. Thank you for allowing Palliative Medicine to participate in the care of Debbie Patterson. Note: This report was completed using [employer] voiced recognition software. Every effort has been made to ensure accuracy; however, inadvertent computerized transcription errors may be present.

## 2022-07-28 NOTE — PROGRESS NOTES
[facility]  Neurology Follow up     Date:  7/28/2022  Patient Name:  Maura Brody  YOB: 1955  MRN: [id number]     Assessment  Suspected R parietal lobe subacute infarct and to a lesser degree area in the L occipital lobe -- given her recent diagnosis of breast CA, cannot r/o mets and MRI brain WWO is pending     Plan  F/u MRI brain [facility] FOR COGNITIVE DISORDERS   Pending results of MRI brain, consider having vascular evaluate her BUTCH stenosis, however given her CA diagnosis, not sure she would really be a candidate anyway even if her carotid stenosis is deemed symptomatic  DAPT and statin therapy with goal LDL < 70   Therapy evals   Risk factor modification       History of Present Illness:  Maura Brody is a 78 y.o. female presenting for evaluation of stroke. She presented with L side weakness and numbness. CT head showed a subacute stroke in the R parietal lobe and also hypodensity in the L occipital lobe. CTAs showed ICA stenosis of 70% on the R\, < 50% on the L and 50% of the L vert at the origin. Echo was unrevealing. She was also found to have UTI and has been started on Abx    Recent hx of breast CA    On exam bipap in place. More alert today. Weaker on L. Following simple commands for me today. Review of Systems:  Limited due to bipap     Allergies: Allergies   Allergen Reactions    Bee Venom Swelling        Physical Examination  Vitals   Vitals:    07/28/22 0427 07/28/22 0735 07/28/22 0912 07/28/22 1030   BP:  131/67     Pulse:  (!) 49     Resp:  16     Temp:  97.9 °F (36.6 °C)     TempSrc:  Temporal     SpO2:  97% 96% 100%   Weight: 114 lb (51.7 kg)      Height:            General: Patient appears in no acute distress. Awake and oriented x 0  HEENT: Normocephalic, atraumatic  Chest: respiratory effort normal on nasal O2   Extremities/Peripheral vascular: No edema/swelling noted. No cold limbs noted. Neurologic Examination    Mental Status  More alert today.  Trouble assessing speech due to bipap. Follows simple commands today and attends to examiner. Cranial Nerves  II. Visual fields- b/l threat present   III, IV, VI: Pupils - equal and reactive to light    EOMs: EOMI intact   V. Facial sensation  VII: Facial movements - appears to have L facial droop   VIII: Hearing intact to voice  IX,X: Palate elevates symmetrically.  Trouble assessing speech due to bipap   XI: Sternocleidomastoid and trapezius 5/5 bilaterally   XII: Tongue is midline    Motor    Left hemiparesis - arm and leg fall to bed    strength weaker on L   Decreased overall bulk   Normal tone     No abnormal movements     Sensation  Responds to pain in all limbs distally    Reflexes    Left babinski     Coordination  No resting tremors observed    Gait  Deferred for safety/fall consideration      Labs  Recent Labs     07/26/22  0610 07/26/22  1050 07/27/22  1459 07/28/22  0605 07/28/22  1003      < >  --  140  --    K 6.0*   < >  --  4.5 4.33   CL 99   < >  --  95*  --    CO2 29   < >  --  38*  --    BUN 59*   < >  --  37*  --    CREATININE 1.6*   < >  --  0.9  --    GLUCOSE 123*   < >  --  121*  --    CALCIUM 8.8   < >  --  8.5*  --    PROT 6.2*   < >  --  5.5*  --    LABALBU 3.2*   < >  --  3.1*  --    BILITOT 1.8*   < >  --  1.2  --    ALKPHOS 93   < >  --  77  --    *   < >  --  43*  --    *   < >  --  175*  --    WBC  --   --  14.1*  --   --    RBC  --   --  5.07  --   --    HGB  --   --  14.9  --   --    HCT  --   --  49.7*  --   --    MCV  --   --  98.0  --   --    MCH  --   --  29.4  --   --    MCHC  --   --  30.0*  --   --    RDW  --   --  15.9*  --   --    PLT  --   --  114*  --   --    MPV  --   --  9.8  --   --    PH  --    < >  --   --  7.406   PO2  --    < >  --   --  108.4*   PCO2  --    < >  --   --  65.8*   HCO3  --    < >  --   --  40.4*   BE  --    < >  --   --  12.2*   O2SAT  --    < >  --   --  97.8   HDL 36  --   --   --   --    LDLCALC 110*  --   --   --   --    LABA1C 5.8*  -- --   --   --     < > = values in this interval not displayed. Imaging  XR CHEST PORTABLE   Final Result   Likely scarring or chronic atelectasis at the left lower lobe. Cardiomegaly. Obstructive airways disease. US DUP UPPER EXTREMITY RIGHT VENOUS   Final Result   Within the visualized vessels there is no evidence for deep venous   thrombosis               XR ABDOMEN (KUB) (SINGLE AP VIEW)   Final Result   No active process. Very small amount of residual contrast in the distal   small bowel and right colon. US DUP LOWER EXTREMITY RIGHT ARTERIES   Final Result   No evidence of peripheral arterial disease with normal waveforms and   velocities throughout the right lower extremity arterial system. RECOMMENDATIONS:   Unavailable         US DUP LOWER EXTREMITY RIGHT YAMILET   Final Result   No evidence of DVT in the right lower extremity. CT ABDOMEN PELVIS WO CONTRAST Additional Contrast? None   Final Result   1. The overall study is slightly suboptimal without the administration of IV   and oral contrast.      2.  Suspicion for left lower lobe pneumonia. 3.  Cardiomegaly again noted, when compared to the prior CT scan of the   abdomen and pelvis performed 07/15/2022 and CTA of the chest performed 1 day   previously. 4.  Partially seen right breast mass again noted. 5.  Vicarious excretion of IV contrast from the patient's CTA performed 07/25   into the gallbladder. 6.  Relatively bright enhancement of the renal parenchyma bilaterally,   indicating delayed excretion of injected contrast from the CTA performed 1   day prior. IV contrast is noted within the ureters and urinary bladder. The   findings indicate mild renal failure. 7.  Small to moderate amount of abdominal and pelvic ascites. CTA PULMONARY W CONTRAST   Final Result   1. Redemonstration of right breast mass with right axillary lymphadenopathy. 2. No evidence of pulmonary embolism.    3. Redemonstration of multiple enlarged mediastinal lymph nodes notable in   lower paratracheal location. 4. Moderate cardiomegaly with findings suggesting right heart failure. 5. Stable atelectatic changes in left lung base. 6. Peribronchial thickening bilaterally suggesting inflammation with sites of   bronchial stenosis notable in posterior segments of the lower lobes. CT Head WO Contrast   Final Result      CTA NECK W CONTRAST   Final Result   CT PERFUSION OF THE HEAD:      1. Area of ischemia is seen in the right parietal lobe, corresponding to the   site of edema identified on the CT of the head. CTA OF THE HEAD:      1. No major arterial stenosis is seen in the brain. CTA OF THE NECK:      1. Approximately 70% stenosis in the proximal right ICA. 2. Less than 50% stenosis in the proximal left ICA. 3. Approximately 50% stenosis of the origin of the left vertebral artery. 4. No significant stenosis in the right vertebral artery. RECOMMENDATIONS:   Unavailable         CTA HEAD W CONTRAST   Final Result   CT PERFUSION OF THE HEAD:      1. Area of ischemia is seen in the right parietal lobe, corresponding to the   site of edema identified on the CT of the head. CTA OF THE HEAD:      1. No major arterial stenosis is seen in the brain. CTA OF THE NECK:      1. Approximately 70% stenosis in the proximal right ICA. 2. Less than 50% stenosis in the proximal left ICA. 3. Approximately 50% stenosis of the origin of the left vertebral artery. 4. No significant stenosis in the right vertebral artery. RECOMMENDATIONS:   Unavailable         CT BRAIN PERFUSION   Final Result   CT PERFUSION OF THE HEAD:      1. Area of ischemia is seen in the right parietal lobe, corresponding to the   site of edema identified on the CT of the head. CTA OF THE HEAD:      1. No major arterial stenosis is seen in the brain. CTA OF THE NECK:      1. Approximately 70% stenosis in the proximal right ICA. 2. Less than 50% stenosis in the proximal left ICA. 3. Approximately 50% stenosis of the origin of the left vertebral artery. 4. No significant stenosis in the right vertebral artery. RECOMMENDATIONS:   Unavailable         MRI BRAIN W WO CONTRAST    (Results Pending)       Echo    Ejection fraction is visually estimated at 50%. Dilated right ventricle and reduced right ventricular function (TAPSE 1.0   cm). Indeterminate diastolic function. Severely dilated right atrium. Mild mitral regurgitation. Mild aortic regurgitation. Moderate tricuspid regurgitation. RV-RA gradient is estimated at 58 mmHg.      I have personally reviewed the following images: CT head     Electronically signed by ALLA Bell on 7/28/2022 at 12:16 PM

## 2022-07-28 NOTE — PROGRESS NOTES
Parag Harris M.D.,[facility]  Leeanne Sousa D.O., F.A.C.O.I., Ajay Romano M.D. Jovani Crow M.D. Gerry Lopez D.O. Daily Pulmonary Progress Note    Patient:  Sebastien Villareal 78 y.o. female MRN: [id number]     Date of Service: 7/28/2022      Synopsis     We are following patient for respiratory failure with hypoxia and hypercapnia, copd exacerbation    \"CC\" recent stroke    Code status: FULL       Subjective      Patient was seen and examined on 2L oxygen, she seems somewhat confused. Sitter at bedside, says she wore the AVAPS, but refused it about 2am.     Review of Systems:  Not able to obtain    24-hour events:  None     Objective   Vitals: /67   Pulse (!) 49   Temp 97.9 °F (36.6 °C) (Temporal)   Resp 16   Ht 5' 7\" (1.702 m)   Wt 114 lb (51.7 kg)   SpO2 100%   BMI 17.85 kg/m²     I/O:    Intake/Output Summary (Last 24 hours) at 7/28/2022 1128  Last data filed at 7/28/2022 0958  Gross per 24 hour   Intake 420 ml   Output 575 ml   Net -155 ml          CPAP/EPAP: 5 cmH2O     CURRENT MEDS :  Scheduled Meds:   cefTRIAXone (ROCEPHIN) IV  1,000 mg IntraVENous Q24H    budesonide  500 mcg Nebulization BID    Arformoterol Tartrate  15 mcg Nebulization BID    melatonin  5 mg Oral Nightly    aspirin  324 mg Oral Once    enoxaparin  30 mg SubCUTAneous Daily    ipratropium-albuterol  1 ampule Inhalation Q4H WA    aspirin  81 mg Oral Daily    clopidogrel  75 mg Oral Daily    rosuvastatin  20 mg Oral Nightly       Physical Exam:  General Appearance: appears comfortable in no acute distress. Frail cachexia  HEENT: Normocephalic atraumatic without obvious abnormality   Neck: Lips, mucosa, and tongue normal.  Supple, symmetrical, trachea midline, no adenopathy;thyroid:  no enlargement/tenderness/nodules or JVD. Lung: Breath sounds diminished. Respirations   unlabored. Symmetrical expansion. Heart: RRR, normal S1, S2. No MRG  Abdomen: Soft, NT, ND. BS present x 4 quadrants.  No bruit or organomegaly. Extremities: Pedal pulses 2+ symmetric b/l. Extremities normal, no cyanosis, clubbing, or edema. Musculokeletal: No joint swelling, no muscle tenderness. ROM normal in all joints of extremities. Neurologic: Mental status: hypersomnolence improved confusion. Sitter at bedside    Pertinent/ New Labs and Imaging Studies     Imaging Personally Reviewed:      CTA pulmonary 7/25  1. Redemonstration of right breast mass with right axillary lymphadenopathy. 2. No evidence of pulmonary embolism. 3. Redemonstration of multiple enlarged mediastinal lymph nodes notable in   lower paratracheal location. 4. Moderate cardiomegaly with findings suggesting right heart failure. 5. Stable atelectatic changes in left lung base. 6. Peribronchial thickening bilaterally suggesting inflammation with sites of   bronchial stenosis notable in posterior segments of the lower lobes. CT brain perfusion 7/25  1. Area of ischemia is seen in the right parietal lobe, corresponding to the   site of edema identified on the CT of the head. CTA OF THE HEAD:       1. No major arterial stenosis is seen in the brain. CTA OF THE NECK:       1. Approximately 70% stenosis in the proximal right ICA. 2. Less than 50% stenosis in the proximal left ICA. 3. Approximately 50% stenosis of the origin of the left vertebral artery. 4. No significant stenosis in the right vertebral artery      CT head WO contrast 7/25  1. LIKELY SUBACUTE INFARCTIONS in the right parietal and left occipital lobes. 2. No hemorrhage, significant mass effect or midline shift. Echo 7/13/22   Summary   Ejection fraction is visually estimated at 50%. Dilated right ventricle and reduced right ventricular function (TAPSE 1.0   cm). Indeterminate diastolic function. Severely dilated right atrium. Mild mitral regurgitation. Mild aortic regurgitation. Moderate tricuspid regurgitation.    RV-RA gradient is estimated at 58 mmHg      Labs:  Lab Results   Component Value Date/Time    WBC 14.1 07/27/2022 02:59 PM    HGB 14.9 07/27/2022 02:59 PM    HCT 49.7 07/27/2022 02:59 PM    MCV 98.0 07/27/2022 02:59 PM    MCH 29.4 07/27/2022 02:59 PM    MCHC 30.0 07/27/2022 02:59 PM    RDW 15.9 07/27/2022 02:59 PM     07/27/2022 02:59 PM    MPV 9.8 07/27/2022 02:59 PM     Lab Results   Component Value Date/Time     07/28/2022 06:05 AM    K 4.33 07/28/2022 10:03 AM    K 6.4 07/25/2022 02:41 PM    CL 95 07/28/2022 06:05 AM    CO2 38 07/28/2022 06:05 AM    BUN 37 07/28/2022 06:05 AM    CREATININE 0.9 07/28/2022 06:05 AM    LABALBU 3.1 07/28/2022 06:05 AM    CALCIUM 8.5 07/28/2022 06:05 AM    GFRAA >60 07/28/2022 06:05 AM    LABGLOM >60 07/28/2022 06:05 AM     Lab Results   Component Value Date/Time    PROTIME 22.5 07/25/2022 02:05 PM    INR 2.0 07/25/2022 02:05 PM     Recent Labs     07/25/22  1441   PROBNP 17,428*       No results for input(s): PROCAL in the last 72 hours. This SmartLink has not been configured with any valid records. Micro:  No results for input(s): CULTRESP in the last 72 hours. No results for input(s): LABGRAM in the last 72 hours. No results for input(s): LEGUR in the last 72 hours. No results for input(s): STREPNEUMAGU in the last 72 hours. No results for input(s): LP1UAG in the last 72 hours.     Repeat abgs on AVAPS 400 + 5 EPAP Latest Reference Range & Units 7/26/22 17:59   pH, Blood Gas 7.350 - 7.450  7.327 (L)   PCO2 35.0 - 45.0 mmHg 72.3 (HH)   pO2 75.0 - 100.0 mmHg 92.3   HCO3 22.0 - 26.0 mmol/L 37.0 (H)      Assessment:    Acute respiratory failure with hypoxia and hypercapnia  Stoke in right middle cerebral artery territory v. Tumor metastatic disease  New breast cancer  DIANN  Hyperkalemia  Emphysema with chronic scarring  Atelectasis  Possible central ELANA  Pulmonary HTN, RVSP 58  Tobacco abuse       Plan:   Wean oxygen as tolerated to keep SpO2 >90%, on 2 L NC  AVAPS 400 + 5 at HS and alternate during day for respiratory support  Trend abg, with improvement   Duonebs Q4H WA,  bronchodilators: Pulmicort/Brovana BID  Recommend antianxiety medications- defer to primary  Chest CT  imaging reviewed, (-) PE, emphysema with chronic scarring noted, bibasilar atelectasis  Steroids completed  BNP 17,428- diuresis limited d/t DIANN- off IVF  Rocephin for UTI per primary  Palliative medicine for CODE STATUS discussion  Neurology for CVA- Plavix and ASA- did not receive tPa. Brain MRI ordered, still pending  Hematology/oncology on board  Smoking cessation education     This plan of care was reviewed in collaboration with Dr. Parminder Villa  Electronically signed by ANGELITA Rubio - CNP on 7/28/2022 at 11:28 AM    Addendum:    MRI of the brain results noted. Will encourage NIV as tolerated. ABG compensated chronic hypercapnic resp failure. Prognosis remain poor. Discussed with palliative team.        I personally saw, examined and provided care for the patient. Radiographs, labs and medication list were reviewed by me independently. I spoke with bedside nursing, therapists and consultants. The case was discussed in detail and plans for care were established. Review of CNP documentation was conducted and revisions were made as appropriate. I agree with the above documented exam, problem list and plan of care.    Manasa Goncalves MD

## 2022-07-29 LAB
ANION GAP SERPL CALCULATED.3IONS-SCNC: 7 MMOL/L (ref 7–16)
ANISOCYTOSIS: ABNORMAL
BASOPHILS ABSOLUTE: 0.02 E9/L (ref 0–0.2)
BASOPHILS RELATIVE PERCENT: 0.2 % (ref 0–2)
BUN BLDV-MCNC: 27 MG/DL (ref 6–23)
CALCIUM SERPL-MCNC: 8.4 MG/DL (ref 8.6–10.2)
CHLORIDE BLD-SCNC: 94 MMOL/L (ref 98–107)
CO2: 37 MMOL/L (ref 22–29)
CREAT SERPL-MCNC: 0.8 MG/DL (ref 0.5–1)
EOSINOPHILS ABSOLUTE: 0 E9/L (ref 0.05–0.5)
EOSINOPHILS RELATIVE PERCENT: 0 % (ref 0–6)
GFR AFRICAN AMERICAN: >60
GFR NON-AFRICAN AMERICAN: >60 ML/MIN/1.73
GLUCOSE BLD-MCNC: 100 MG/DL (ref 74–99)
HCT VFR BLD CALC: 47.6 % (ref 34–48)
HEMOGLOBIN: 14.9 G/DL (ref 11.5–15.5)
IMMATURE GRANULOCYTES #: 0.05 E9/L
IMMATURE GRANULOCYTES %: 0.4 % (ref 0–5)
LYMPHOCYTES ABSOLUTE: 1.46 E9/L (ref 1.5–4)
LYMPHOCYTES RELATIVE PERCENT: 12.2 % (ref 20–42)
MCH RBC QN AUTO: 30 PG (ref 26–35)
MCHC RBC AUTO-ENTMCNC: 31.3 % (ref 32–34.5)
MCV RBC AUTO: 96 FL (ref 80–99.9)
METER GLUCOSE: 105 MG/DL (ref 74–99)
METER GLUCOSE: 84 MG/DL (ref 74–99)
MONOCYTES ABSOLUTE: 1.65 E9/L (ref 0.1–0.95)
MONOCYTES RELATIVE PERCENT: 13.8 % (ref 2–12)
NEUTROPHILS ABSOLUTE: 8.76 E9/L (ref 1.8–7.3)
NEUTROPHILS RELATIVE PERCENT: 73.4 % (ref 43–80)
PDW BLD-RTO: 15.7 FL (ref 11.5–15)
PLATELET # BLD: 91 E9/L (ref 130–450)
PLATELET CONFIRMATION: NORMAL
PMV BLD AUTO: 9.7 FL (ref 7–12)
POLYCHROMASIA: ABNORMAL
POTASSIUM SERPL-SCNC: 4.1 MMOL/L (ref 3.5–5)
RBC # BLD: 4.96 E12/L (ref 3.5–5.5)
SODIUM BLD-SCNC: 138 MMOL/L (ref 132–146)
URINE CULTURE, ROUTINE: NORMAL
WBC # BLD: 11.9 E9/L (ref 4.5–11.5)

## 2022-07-29 PROCEDURE — 97165 OT EVAL LOW COMPLEX 30 MIN: CPT

## 2022-07-29 PROCEDURE — 2700000000 HC OXYGEN THERAPY PER DAY

## 2022-07-29 PROCEDURE — 2580000003 HC RX 258: Performed by: STUDENT IN AN ORGANIZED HEALTH CARE EDUCATION/TRAINING PROGRAM

## 2022-07-29 PROCEDURE — 97530 THERAPEUTIC ACTIVITIES: CPT

## 2022-07-29 PROCEDURE — 6360000002 HC RX W HCPCS: Performed by: STUDENT IN AN ORGANIZED HEALTH CARE EDUCATION/TRAINING PROGRAM

## 2022-07-29 PROCEDURE — 6370000000 HC RX 637 (ALT 250 FOR IP): Performed by: STUDENT IN AN ORGANIZED HEALTH CARE EDUCATION/TRAINING PROGRAM

## 2022-07-29 PROCEDURE — 82962 GLUCOSE BLOOD TEST: CPT

## 2022-07-29 PROCEDURE — 6370000000 HC RX 637 (ALT 250 FOR IP): Performed by: PHYSICIAN ASSISTANT

## 2022-07-29 PROCEDURE — 6360000002 HC RX W HCPCS

## 2022-07-29 PROCEDURE — 94660 CPAP INITIATION&MGMT: CPT

## 2022-07-29 PROCEDURE — 94640 AIRWAY INHALATION TREATMENT: CPT

## 2022-07-29 PROCEDURE — 36415 COLL VENOUS BLD VENIPUNCTURE: CPT

## 2022-07-29 PROCEDURE — 92507 TX SP LANG VOICE COMM INDIV: CPT

## 2022-07-29 PROCEDURE — 85025 COMPLETE CBC W/AUTO DIFF WBC: CPT

## 2022-07-29 PROCEDURE — 92523 SPEECH SOUND LANG COMPREHEN: CPT

## 2022-07-29 PROCEDURE — 2060000000 HC ICU INTERMEDIATE R&B

## 2022-07-29 PROCEDURE — 99233 SBSQ HOSP IP/OBS HIGH 50: CPT | Performed by: PHYSICIAN ASSISTANT

## 2022-07-29 PROCEDURE — 97161 PT EVAL LOW COMPLEX 20 MIN: CPT

## 2022-07-29 PROCEDURE — 80048 BASIC METABOLIC PNL TOTAL CA: CPT

## 2022-07-29 RX ADMIN — BUDESONIDE 500 MCG: 0.5 SUSPENSION RESPIRATORY (INHALATION) at 21:17

## 2022-07-29 RX ADMIN — APIXABAN 5 MG: 5 TABLET, FILM COATED ORAL at 20:30

## 2022-07-29 RX ADMIN — ARFORMOTEROL TARTRATE 15 MCG: 15 SOLUTION RESPIRATORY (INHALATION) at 21:17

## 2022-07-29 RX ADMIN — Medication 5 MG: at 20:30

## 2022-07-29 RX ADMIN — CLOPIDOGREL BISULFATE 75 MG: 75 TABLET ORAL at 09:08

## 2022-07-29 RX ADMIN — IPRATROPIUM BROMIDE AND ALBUTEROL SULFATE 1 AMPULE: .5; 2.5 SOLUTION RESPIRATORY (INHALATION) at 08:52

## 2022-07-29 RX ADMIN — ROSUVASTATIN 20 MG: 20 TABLET, FILM COATED ORAL at 20:31

## 2022-07-29 RX ADMIN — IPRATROPIUM BROMIDE AND ALBUTEROL SULFATE 1 AMPULE: .5; 2.5 SOLUTION RESPIRATORY (INHALATION) at 21:17

## 2022-07-29 RX ADMIN — IPRATROPIUM BROMIDE AND ALBUTEROL SULFATE 1 AMPULE: .5; 2.5 SOLUTION RESPIRATORY (INHALATION) at 15:57

## 2022-07-29 RX ADMIN — ASPIRIN 81 MG: 81 TABLET, COATED ORAL at 09:12

## 2022-07-29 RX ADMIN — ENOXAPARIN SODIUM 30 MG: 100 INJECTION SUBCUTANEOUS at 09:09

## 2022-07-29 RX ADMIN — IPRATROPIUM BROMIDE AND ALBUTEROL SULFATE 1 AMPULE: .5; 2.5 SOLUTION RESPIRATORY (INHALATION) at 12:18

## 2022-07-29 RX ADMIN — BUDESONIDE 500 MCG: 0.5 SUSPENSION RESPIRATORY (INHALATION) at 08:55

## 2022-07-29 RX ADMIN — CEFTRIAXONE 1000 MG: 1 INJECTION, POWDER, FOR SOLUTION INTRAMUSCULAR; INTRAVENOUS at 13:54

## 2022-07-29 RX ADMIN — LORAZEPAM 1 MG: 1 TABLET ORAL at 13:54

## 2022-07-29 RX ADMIN — ARFORMOTEROL TARTRATE 15 MCG: 15 SOLUTION RESPIRATORY (INHALATION) at 08:55

## 2022-07-29 NOTE — PROGRESS NOTES
Consult received. Dr. Lui Damon will evaluate patient for appropriateness for ARU admission.    Jaqueline Li RN   Pre-screener/  [phone]

## 2022-07-29 NOTE — PROGRESS NOTES
Pulse ox was  84% on room air at rest.  Pt is unable to Ambulated   Oxygen applied  Recovery pulse ox was 95% on  2 liters of oxygen while moving in bed

## 2022-07-29 NOTE — PROGRESS NOTES
orientation to spatial and temporal surroundings with use of external memory aides. Pt will improve immediate, short term, recent memory during structured and unstructured tasks with 75% accuracy   Pt will improve problem solving/thought organization during structured and unstructured tasks with 75% accuracy   Pt will improve receptive and expressive language skills with adequate thought content, organization, and processing time to facilitate improved communication with minimal.  Pt will improve receptive language skills for response to [facility]- questions and follow through of simple to multi-step directions with 75% accuracy. Pt will improve word finding and verbal fluency with phrase/sentence level, wh-questions and open ended conversation through incorporation of preparatory and circumlocution strategies 75% accuracy  Pt will improve expressive language skills to improve accuracy of completion of automatic speech tasks, object/picture naming, phrase completion, and phrasal expression of basic wants and needs with 75% accuracy    Patient goals: Patient/family involved in developing goals and treatment plan:   Treatment goals discussed with Patient    The Patient did not demonstrate complete understanding of the diagnosis, prognosis and plan of care   The patient/family Agreed with above,     This plan may be re-evaluated and revised as warranted. Rehabilitation Potential/Prognosis: fair                CLINICAL ASSESSMENT:  MOTOR SPEECH       Oral Peripheral Examination   Left labiobuccal weakness, difficult to fully assess due to pt difficulty following commands    Parameters of Speech Production  Respiration:  Adequate for speech production  Articulation:  Within functional limits  Resonance:  Within functional limits  Quality:   Strained at times   Pitch:     Within functional limits  Intensity: Within functional limits  Fluency:  Intact  Prosody Intact    RECEPTIVE LANGUAGE    Comprehension of Yes/No Questions:   Inconsistent    Process  Simple Verbal Commands:   Within functional limits  Process Intermediate Verbal Commands:   Inconsistent  Process Complex Verbal Commands:     Impaired    Comprehension of Conversation:      Incomplete and Inconsistent      EXPRESSIVE LANGUAGE     Serials: Functional    Imitation:  Words   Functional   Sentences Impaired    Naming:  (Modality used:  Verbal)  Confrontation Naming  Mildly Impaired  Functional Description  To be assessed  Response Naming: Functional    Conversation:      Confusion was noted during conversation    COGNITION     Attention/Orientation  Attention: Sustained attention   Orientation:  Oriented to Person, month, year, and date only    Memory   Immediate Recall: Repeated 3/3 with min cues    Delayed Recall:   Recalled 2/3    Long Term Recall:   Recalled Birthdate    Organization/Problem Solving/Reasoning   Verbal Sequencing: To be assessed        Verbal Problem solving:   Impaired          CLINICAL OBSERVATIONS NOTED DURING THE EVALUATION  Latent responses, Inconsistent responses, and Cueing was required                  EDUCATION:   The Speech Language Pathologist (SLP) completed education regarding results of evaluation and that intervention is warranted at this time. Learner: Patient  Education: Reviewed results and recommendations of this evaluation  Evaluation of Education:  Needs further instruction    Evaluation Time includes thorough review of current medical information, gathering information on past medical history/social history and prior level of function, completion of standardized testing/informal observation of tasks, assessment of data and education on plan of care and goals. CPT code:    58374  eval speech sound lang comprehension      The admitting diagnosis and active problem list, as listed below have been reviewed prior to initiation of this evaluation.         ACTIVE PROBLEM LIST:   Patient Active Problem List   Diagnosis Chronic obstructive pulmonary disease (HCC)    Arthritis    Mild episode of recurrent major depressive disorder (HCC)    Primary hypertension    Elevated troponin    Acute right heart failure (HCC)    Severe pulmonary hypertension (HCC)    Tobacco abuse    Breast mass, right    Cerebrovascular accident (CVA) ([facility] [address]     Tx note (00867): SLP educated pt re: purpose of ST evaluation and on ST POC; questions answered. SLP provided verbal cues during STM/recall task, which were effective to improving recall from 2/3 I, to 3/3 given min v/c. SLP educated pt re: whiteboard in room to assist with temporal orientation and reducing confusion. Pt reported noticing it to be hard of hearing and that she has sore throat; SLP informed pt's nurse of the above. Pt left in room w/ callbell w/I reach following session.

## 2022-07-29 NOTE — PROGRESS NOTES
Date: 7/28/2022    Time: 10:04 PM    Patient Placed On BIPAP/CPAP/ Non-Invasive Ventilation? Yes    If no must comment. Facial area red/color change? No           If YES are Blister/Lesion present? No   If yes must notify nursing staff  BIPAP/CPAP skin barrier?   Yes    Skin barrier type:mepilexlite       Comments:        Jojo Bacon RCP

## 2022-07-29 NOTE — PROGRESS NOTES
Occupational Therapy  OCCUPATIONAL THERAPY INITIAL EVALUATION     Makeda P[facility] [place] [employer] [address] [address]      Date:2022                                                Patient Name: Maura Brody  MRN: [id number]  : 1955  Room: [id number]    Referring Provider: Violet Elizabeth MD  Specific Provider Orders/Date: OT eval and treat 22    Diagnosis: Hyperkalemia [E87.5]  Acute ischemic stroke [facility]) [I63.9]  Acute kidney injury ([facility] [address] [N17.9]  Acute respiratory failure with hypoxia and hypercapnia ([facility] [address] [J96.01, J96.02]  Cerebrovascular accident (CVA), unspecified mechanism ([facility] [address] [I63.9]   Pt admitted to hospital on 22 for global L side weakness and facial droop. Respiratory   Per chart, recently diagnosed w/ breast CA ~10 days prior to admission      Pertinent Medical History:  has a past medical history of Anxiety, Arthritis, Cancer ([address] Chronic bronchitis ([address] Emphysema, and Walking pneumonia.        Precautions:  Fall Risk, cognition, L hemiparesis, O2, bed alarm  Recent diagnosis of breast CA w/ mets    Assessment of current deficits    [x] Functional mobility  [x]ADLs  [x] Strength               [x]Cognition    [x] Functional transfers   [x] IADLs         [x] Safety Awareness   [x]Endurance    [x] Fine Coordination              [x] Balance      [] Vision/perception   []Sensation     []Gross Motor Coordination  [x] ROM  [] Delirium                   [] Motor Control     OT PLAN OF CARE   OT POC based on physician orders, patient diagnosis and results of clinical assessment    Frequency/Duration 2-3 days/wk for 2 weeks PRN   Specific OT Treatment Interventions to include:   * Instruction/training on adapted ADL techniques and AE recommendations to increase functional independence within precautions       * Training on energy conservation strategies, correct breathing pattern and techniques to improve independence/tolerance for self-care routine  * Functional transfer/mobility training/DME recommendations for increased independence, safety, and fall prevention  * Patient/Family education to increase follow through with safety techniques and functional independence  * Recommendation of environmental modifications for increased safety with functional transfers/mobility and ADLs  * Cognitive retraining/development of therapeutic activities to improve problem solving, judgement, memory, and attention for increased safety/participation in ADL/IADL tasks  * Therapeutic exercise to improve motor endurance, ROM, and functional strength for ADLs/functional transfers  * Therapeutic activities to facilitate/challenge dynamic balance, stand tolerance for increased safety and independence with ADLs  * Therapeutic activities to facilitate gross/fine motor skills for increased independence with ADLs  * Positioning to improve skin integrity, interaction with environment and functional independence      OTMRS   Modified Amanda Scale (MRS)  Score     Description  0             No symptoms  1             No significant disability despite symptoms  2             Slight disability; able to look after own affairs  3             Moderate disability; able to ambulate without assist/ requires assist with ADLs  4             Moderate/Severe disability;requires assist to ambulate/assist with ADLs  5             Severe disability;bedridden/incontinent   6               Score:   4    Recommended Adaptive Equipment: TBD     Home Living: Pt lives with grandson (4 y/o) however pt verbalized will be staying at sister's 2 floor home (1st floor setup). Pt is a very questionable historian. Prior Level of Function:  unable to obtain accurate PLOF from pt d/t cognition.  Family/caregiver not present    Pain Level: Pt c/o no pain this session     Cognition: A&O: 2/4; Follows 1 step directions  Confusion noted during functional tasks - cues provided to sequence, attend to and initiate tasks. Easily distracted. Poor insight  L facial droop   Memory:  fair -   Sequencing:  fair -   Problem solving:  poor   Judgement/safety:  poor     Functional Assessment:  AM-PAC Daily Activity Raw Score: 12/24   Initial Eval Status  Date: 7/29/22 Treatment Status  Date: STGs = LTGs  Time frame: 10-14 days   Feeding Minimal Assist   Supervision    Grooming Moderate Assist   Washed hands while seated EOB  Supervision    UB Dressing Moderate Assist   Stand by Assist    LB Dressing Dependent   Moderate Assist    Bathing Maximal Assist  Minimal Assist    Toileting Maximal Assist   Minimal Assist    Bed Mobility  Supine to sit: Minimal Assist   Sit to supine: Minimal Assist   Supine to sit: Supervision   Sit to supine: Supervision    Functional Transfers Moderate Assist   Stand by Assist    Functional Mobility NT  Secondary to safety concerns  Stand by Assist    Balance Sitting:     Static:  Mod A (posterolateral lean to R)    Dynamic:Mod A  Standing: Mod A w/ HHA     Activity Tolerance Fair-  Fair+   Visual/  Perceptual Glasses: yes  L inattention noted during functional tasks. Unable to formally assess d/t cognition                  Hand Dominance R   AROM (PROM) Strength Additional Info:    RUE  WFL 4-/5 good  and wfl FMC/dexterity noted during ADL tasks       LUE Distal, proximal AROM: limited  PROM: WFL   Shoulder flexion: 2-/5  Elbow flexion: 2/5  Hand: 2/5 Poor  and poor FMC/dexterity noted during ADL tasks  Impaired proprioception L UE     Hearing: WFL   Sensation:  c/o numbness L hand  Tone: WFL   Edema: none noted    Comments: Upon arrival patient lying in bed. Therapist educated pt on role of OT. RN clearance. At end of session, patient lying in bed (bed alarm on) with call light and phone within reach, all lines and tubes intact.   Overall patient demonstrated decreased independence and safety during completion of ADL/functional transfer/mobility tasks. Pt would benefit from continued skilled OT to increase safety and independence with completion of ADL/IADL tasks for functional independence and quality of life. Treatment: OT treatment provided this date includes: Facilitation of bed mobility (education/cues for , attention and safety), unsupported sitting balance (addressing posture, weight shifting, dynamic reaching to prep for ADL's), functional transfers (w/ education/cues for safety/hand placement) and standing tolerance tasks (addressing posture, balance and activity tolerance impacting ADLs and functional activity). Therapist facilitated self-care retraining: UB self-care tasks, feeding task and seated grooming tasks while educating/cuing pt on modified techniques, posture, safety and energy conservation techniques. Reinforced protection of L UE at rest and during all functional tasks. Skilled monitoring of HR, O2 sats and pts response to treatment. Pt on 4L O2 via nasal cannula. O2 sat=WFL during session. Rehab Potential: Good for established goals     Patient / Family Goal: not stated      Patient and/or family were instructed on functional diagnosis, prognosis/goals and OT plan of care. Demonstrated poor understanding.      Eval Complexity: Low    Time In: 11:31  Time Out: 12:00  Total Treatment Time: 15 minutes    Min Units   OT Eval Low 97165  X  1   OT Eval Medium 53668      OT Eval High 81662      OT Re-Eval [id number]       Therapeutic Ex 96948       Therapeutic Activities 25219  10  1   ADL/Self Care 23364  5  0   Orthotic Management 08272       Manual [id number]     Neuro Re-Ed 29929       Non-Billable Time          Evaluation Time additionally includes thorough review of current medical information, gathering information on past medical history/social history and prior level of function, interpretation of standardized testing/informal observation of tasks, assessment of data and development of plan of care and [address], OTR/L #9115

## 2022-07-29 NOTE — PLAN OF CARE
Problem: Chronic Conditions and Co-morbidities  Goal: Patient's chronic conditions and co-morbidity symptoms are monitored and maintained or improved  Outcome: Progressing     Problem: Discharge Planning  Goal: Discharge to home or other facility with appropriate resources  Outcome: Progressing     Problem: Confusion  Goal: Confusion, delirium, dementia, or psychosis is improved or at baseline  Outcome: Progressing     Problem: Safety - Adult  Goal: Free from fall injury  Outcome: Progressing     Problem: Safety - Medical Restraint  Goal: Remains free of injury from restraints (Restraint for Interference with Medical Device)  Outcome: Progressing     Problem: Pain  Goal: Verbalizes/displays adequate comfort level or baseline comfort level  Outcome: Progressing     Problem: Cardiovascular - Adult  Goal: Maintains optimal cardiac output and hemodynamic stability  Outcome: Progressing     Problem: Metabolic/Fluid and Electrolytes - Adult  Goal: Hemodynamic stability and optimal renal function maintained  Outcome: Progressing

## 2022-07-29 NOTE — CONSULTS
[address]                  [address] [facility], [address]                                  CONSULTATION    PATIENT NAME: Jimbo Arreola                       :        1955  MED REC NO:   [id number]                            ROOM:       8508  ACCOUNT NO:   [de-identified]                           ADMIT DATE: 2022  PROVIDER:     Krystian Todd MD    CONSULT DATE:  2022    CHIEF COMPLAINT:  CVA. HISTORY OF PRESENT ILLNESS:  The patient was admitted to [address] [place] [address]  on 2022. She was felt to have had a CVA, which I believe has been  confirmed. She also has underlying lung cancer. She has been evaluated  by PT. She was moderate assist for transfers, not able to do any  ambulation. I was asked to evaluate her, but she refused to allow me to  examine her and refused to talk to me. She does seem confused and that  she says, \"you didn't help me last time,\" even though I have never seen  her before. PAST MEDICAL HISTORY:  1. Congestive heart failure. 2.  Breast cancer. 3.  COPD. 4.  CVA. ALLERGIES:  BEE VENOM. CURRENT MEDICATIONS:  Brovana, aspirin, Pulmicort, Rocephin, Plavix,  Lovenox, and Crestor. PHYSICAL EXAMINATION:  The patient refused exam.    DIAGNOSES:  1.  CVA. 2.  Breast cancer. RECOMMENDATIONS:  At this point, I cannot really recommend rehab given  that she is not cooperating with the eval and I have concerns that she  would not cooperate with therapy. We can reconsider if she decides to  be more cooperative, and I believe she does have orders for OT already,  so that they could see her.         Winter Nielson MD    D: 2022 13:33:08       T: 2022 13:35:33     TP/S_WEEK_  Job#: [id number]     Doc#: [id number]    CC:

## 2022-07-29 NOTE — PROGRESS NOTES
appears comfortable in no acute distress. Frail cachexia  HEENT: Normocephalic atraumatic without obvious abnormality   Neck: Lips, mucosa, and tongue normal.  Supple, symmetrical, trachea midline, no adenopathy;thyroid:  no enlargement/tenderness/nodules or JVD. Lung: Breath sounds diminished. Respirations   unlabored. Symmetrical expansion. Heart: RRR, normal S1, S2. No MRG  Abdomen: Soft, NT, ND. BS present x 4 quadrants. No bruit or organomegaly. Extremities: Pedal pulses 2+ symmetric b/l. Extremities normal, no cyanosis, clubbing, or edema. Musculokeletal: No joint swelling, no muscle tenderness. ROM normal in all joints of extremities. Neurologic: Mental status: hypersomnolence improved confusion. Still with some confusion    Pertinent/ New Labs and Imaging Studies     Imaging Personally Reviewed:      CTA pulmonary 7/25  1. Redemonstration of right breast mass with right axillary lymphadenopathy. 2. No evidence of pulmonary embolism. 3. Redemonstration of multiple enlarged mediastinal lymph nodes notable in   lower paratracheal location. 4. Moderate cardiomegaly with findings suggesting right heart failure. 5. Stable atelectatic changes in left lung base. 6. Peribronchial thickening bilaterally suggesting inflammation with sites of   bronchial stenosis notable in posterior segments of the lower lobes. CT brain perfusion 7/25  1. Area of ischemia is seen in the right parietal lobe, corresponding to the   site of edema identified on the CT of the head. CTA OF THE HEAD:       1. No major arterial stenosis is seen in the brain. CTA OF THE NECK:       1. Approximately 70% stenosis in the proximal right ICA. 2. Less than 50% stenosis in the proximal left ICA. 3. Approximately 50% stenosis of the origin of the left vertebral artery. 4. No significant stenosis in the right vertebral artery      CT head WO contrast 7/25  1.  LIKELY SUBACUTE INFARCTIONS in the right parietal and left occipital lobes. 2. No hemorrhage, significant mass effect or midline shift. MRI 7/28  1. Acute or early subacute infarctions in the right frontal lobe and the left   occipital lobe. 2. No hemorrhage, significant mass effect or midline shift. Echo 7/13/22   Summary   Ejection fraction is visually estimated at 50%. Dilated right ventricle and reduced right ventricular function (TAPSE 1.0   cm). Indeterminate diastolic function. Severely dilated right atrium. Mild mitral regurgitation. Mild aortic regurgitation. Moderate tricuspid regurgitation. RV-RA gradient is estimated at 58 mmHg      Labs:  Lab Results   Component Value Date/Time    WBC 11.9 07/29/2022 05:38 AM    HGB 14.9 07/29/2022 05:38 AM    HCT 47.6 07/29/2022 05:38 AM    MCV 96.0 07/29/2022 05:38 AM    MCH 30.0 07/29/2022 05:38 AM    MCHC 31.3 07/29/2022 05:38 AM    RDW 15.7 07/29/2022 05:38 AM    PLT 91 07/29/2022 05:38 AM    MPV 9.7 07/29/2022 05:38 AM     Lab Results   Component Value Date/Time     07/29/2022 05:38 AM    K 4.1 07/29/2022 05:38 AM    K 6.4 07/25/2022 02:41 PM    CL 94 07/29/2022 05:38 AM    CO2 37 07/29/2022 05:38 AM    BUN 27 07/29/2022 05:38 AM    CREATININE 0.8 07/29/2022 05:38 AM    LABALBU 3.1 07/28/2022 06:05 AM    CALCIUM 8.4 07/29/2022 05:38 AM    GFRAA >60 07/29/2022 05:38 AM    LABGLOM >60 07/29/2022 05:38 AM     Lab Results   Component Value Date/Time    PROTIME 22.5 07/25/2022 02:05 PM    INR 2.0 07/25/2022 02:05 PM     No results for input(s): PROBNP in the last 72 hours. No results for input(s): PROCAL in the last 72 hours. This SmartLink has not been configured with any valid records. Micro:  No results for input(s): CULTRESP in the last 72 hours. No results for input(s): LABGRAM in the last 72 hours. No results for input(s): LEGUR in the last 72 hours. No results for input(s): STREPNEUMAGU in the last 72 hours. No results for input(s): LP1UAG in the last 72 hours.     Repeat

## 2022-07-29 NOTE — CARE COORDINATION
Per nursing- per son Fidel Citizen discharge plan is to have his mother go to her sisters Shanelle's house not SNF. Call placed to son He has no preference for [facility] or [facility]. Referral to [facility] and start of care is Friday and Dr Polanco Duarte willing to follow orders as she has no PCP. Referral to [facility] for oxygen and NIV. Faxed info to ELVIRA Gaffney at [phone] [facility] and wheelchair orders. Per bedside RN- now change of plans will not be going to her sisters house will be going to [address]  @ [address] [place] [address]. Also discussed  ARU here @ Hospital.Electronically signed by Thai Bliss RN on 7/29/2022 at 12:49 PM    Call placed to [facility] notified that per pulmonology (Dr Jillian Canales )needs to have NIV prior to be discharged.home. It requires an insurance and he wont know anything prior to early /mid next week. Electronically signed by Thai Bliss RN on 7/29/2022 at 2:08 PM    [address] [facility] that pt will not be discharging as she needs NIV prior to her leaving and it requires and insurance precert./ and the prescription needs to be signed by  or cnp and returned back to [facility]. Beside RN also ntfd. Electronically signed by Thai Bliss RN on 7/29/2022 at 3:05 PM    NIV prescription will be in soft chart for Anjali Hickey CNP with pulmonology to fill out and then cm/sw will need to fax to [facility] [phone]. Electronically signed by Thai Bliss RN on 7/29/2022 at 3:27 PM    [facility] TERM \[facility] [place] [facility]\"" @ [employer] [facility] ntfd of hospital bed order. Ntfd pt cannot discharge until NIV has been approved and delivered to home per pulmonary. Electronically signed by Thai Bliss RN on 7/29/2022 at 3:50 PM

## 2022-07-29 NOTE — PROGRESS NOTES
Pulse:  61     Resp:  20     Temp:  98.1 °F (36.7 °C)     TempSrc:  Oral     SpO2:  100% 96% 95%   Weight: 115 lb 1.6 oz (52.2 kg)      Height:            General: Patient appears in no acute distress. Awake and oriented x 3  HEENT: Normocephalic, atraumatic  Chest: respiratory effort normal   Extremities/Peripheral vascular: No edema/swelling noted. No cold limbs noted. Neurologic Examination    Mental Status  Much more alert today. Oriented to self, place, year. Follows commands well. Speech fluent with intact comprehension. Memories intact. Concentration and attention appear normal.     Cranial Nerves  II. Visual fields- b/l threat present   III, IV, VI: Pupils - equal and reactive to light    EOMs: EOMI intact   V. Facial sensation  VII: Facial movements - L facial droop   VIII: Hearing intact to voice  IX,X: Palate elevates symmetrically.  No dysarthria   XI: Sternocleidomastoid and trapezius 5/5 bilaterally   XII: Tongue is midline    Motor    Left hemiparesis - arm > leg   Decreased overall bulk   Normal tone     No abnormal movements     Sensation  LT intact distally in all limbs     Reflexes    Left babinski     Coordination  FFM, FN intact on R, impaired on L r/t weakness     Gait  Deferred for safety/fall consideration    Labs  Recent Labs     07/28/22  0605 07/28/22  1003 07/28/22  1728 07/29/22  0538     --   --  138   K 4.5 4.33  --  4.1   CL 95*  --   --  94*   CO2 38*  --   --  37*   BUN 37*  --   --  27*   CREATININE 0.9  --   --  0.8   GLUCOSE 121*  --   --  100*   CALCIUM 8.5*  --   --  8.4*   PROT 5.5*  --   --   --    LABALBU 3.1*  --   --   --    BILITOT 1.2  --   --   --    ALKPHOS 77  --   --   --    AST 43*  --   --   --    *  --   --   --    WBC  --   --    < > 11.9*   RBC  --   --    < > 4.96   HGB  --   --    < > 14.9   HCT  --   --    < > 47.6   MCV  --   --    < > 96.0   MCH  --   --    < > 30.0   MCHC  --   --    < > 31.3*   RDW  --   --    < > 15.7*   PLT  --   -- < > 91*   MPV  --   --    < > 9.7   PH  --  7.406  --   --    PO2  --  108.4*  --   --    PCO2  --  65.8*  --   --    HCO3  --  40.4*  --   --    BE  --  12.2*  --   --    O2SAT  --  97.8  --   --     < > = values in this interval not displayed. Lab Results   Component Value Date    LDLCALC 110 (H) 07/26/2022     Lab Results   Component Value Date    LABA1C 5.8 (H) 07/26/2022     Imaging  MRI BRAIN W WO CONTRAST   Final Result   1. Acute or early subacute infarctions in the right frontal lobe and the left   occipital lobe. 2. No hemorrhage, significant mass effect or midline shift. RECOMMENDATIONS:   Unavailable         XR CHEST PORTABLE   Final Result   Likely scarring or chronic atelectasis at the left lower lobe. Cardiomegaly. Obstructive airways disease. US DUP UPPER EXTREMITY RIGHT VENOUS   Final Result   Within the visualized vessels there is no evidence for deep venous   thrombosis               XR ABDOMEN (KUB) (SINGLE AP VIEW)   Final Result   No active process. Very small amount of residual contrast in the distal   small bowel and right colon. US DUP LOWER EXTREMITY RIGHT ARTERIES   Final Result   No evidence of peripheral arterial disease with normal waveforms and   velocities throughout the right lower extremity arterial system. RECOMMENDATIONS:   Unavailable         US DUP LOWER EXTREMITY RIGHT YAMILET   Final Result   No evidence of DVT in the right lower extremity. CT ABDOMEN PELVIS WO CONTRAST Additional Contrast? None   Final Result   1. The overall study is slightly suboptimal without the administration of IV   and oral contrast.      2.  Suspicion for left lower lobe pneumonia. 3.  Cardiomegaly again noted, when compared to the prior CT scan of the   abdomen and pelvis performed 07/15/2022 and CTA of the chest performed 1 day   previously. 4.  Partially seen right breast mass again noted.       5.  Vicarious excretion of IV contrast from the patient's CTA performed 07/25   into the gallbladder. 6.  Relatively bright enhancement of the renal parenchyma bilaterally,   indicating delayed excretion of injected contrast from the CTA performed 1   day prior. IV contrast is noted within the ureters and urinary bladder. The   findings indicate mild renal failure. 7.  Small to moderate amount of abdominal and pelvic ascites. CTA PULMONARY W CONTRAST   Final Result   1. Redemonstration of right breast mass with right axillary lymphadenopathy. 2. No evidence of pulmonary embolism. 3. Redemonstration of multiple enlarged mediastinal lymph nodes notable in   lower paratracheal location. 4. Moderate cardiomegaly with findings suggesting right heart failure. 5. Stable atelectatic changes in left lung base. 6. Peribronchial thickening bilaterally suggesting inflammation with sites of   bronchial stenosis notable in posterior segments of the lower lobes. CT Head WO Contrast   Final Result      CTA NECK W CONTRAST   Final Result   CT PERFUSION OF THE HEAD:      1. Area of ischemia is seen in the right parietal lobe, corresponding to the   site of edema identified on the CT of the head. CTA OF THE HEAD:      1. No major arterial stenosis is seen in the brain. CTA OF THE NECK:      1. Approximately 70% stenosis in the proximal right ICA. 2. Less than 50% stenosis in the proximal left ICA. 3. Approximately 50% stenosis of the origin of the left vertebral artery. 4. No significant stenosis in the right vertebral artery. RECOMMENDATIONS:   Unavailable         CTA HEAD W CONTRAST   Final Result   CT PERFUSION OF THE HEAD:      1. Area of ischemia is seen in the right parietal lobe, corresponding to the   site of edema identified on the CT of the head. CTA OF THE HEAD:      1. No major arterial stenosis is seen in the brain. CTA OF THE NECK:      1. Approximately 70% stenosis in the proximal right ICA.    2. Less than 50% stenosis in the proximal left ICA. 3. Approximately 50% stenosis of the origin of the left vertebral artery. 4. No significant stenosis in the right vertebral artery. RECOMMENDATIONS:   Unavailable         CT BRAIN PERFUSION   Final Result   CT PERFUSION OF THE HEAD:      1. Area of ischemia is seen in the right parietal lobe, corresponding to the   site of edema identified on the CT of the head. CTA OF THE HEAD:      1. No major arterial stenosis is seen in the brain. CTA OF THE NECK:      1. Approximately 70% stenosis in the proximal right ICA. 2. Less than 50% stenosis in the proximal left ICA. 3. Approximately 50% stenosis of the origin of the left vertebral artery. 4. No significant stenosis in the right vertebral artery. RECOMMENDATIONS:   Unavailable             Echo    Ejection fraction is visually estimated at 50%. Dilated right ventricle and reduced right ventricular function (TAPSE 1.0   cm). Indeterminate diastolic function. Severely dilated right atrium. Mild mitral regurgitation. Mild aortic regurgitation. Moderate tricuspid regurgitation. RV-RA gradient is estimated at 58 mmHg.      I have personally reviewed the following images: CT head, MRI brain     Electronically signed by ALLA Quintero on 7/29/2022 at 11:08 AM

## 2022-07-29 NOTE — PROGRESS NOTES
Palliative Care Department  [phone]  Palliative Care Progress Note  Provider ANGELITA Pelayo CNP      PATIENT: Issac Neal  : 1955  MRN: [id number]  ADMISSION DATE: 2022  1:54 PM  Referring Provider: Elenita Harris MD    Palliative Medicine was consulted on hospital day 4 for assistance with Goals of care, Code Status Discussion. HPI:     Keila Montesinos is a 78 y.o. y/o female with a history of pulmonary hypertension, tobacco use, right breast cancer, COPD, hypertension who presented to [facility] on 2022 with concern for stroke. Per family, patient was experiencing strokelike symptoms, upon EMS arrival patient was found to be hypoxic with oxygen saturation of 56% on room air, she was placed under nonrebreather mask, currently on following nasal cannula. CT scan shows subacute infarction with area of ischemia in the right parietal and left occipital lobes. CT of the head and neck shows 70% stenosis of the right proximal ICA. Patient was out of the window for tPA, aspirin was given in the emergency room. Oncology, pulmonology, and neurology consulted. Palliative medicine consulted to discuss goal of care and CODE STATUS discussion. ASSESSMENT/PLAN:     Pertinent Hospital Diagnoses     Breast CA; Biopsy positive for invasive poorly differentiated carcinoma with squamous differentiation  CVA    Palliative Care Encounter / Counseling Regarding Goals of Care  Please see detailed goals of care discussion as below  At this time, Issac Neal, Does Not have capacity for medical decision-making.  Capacity is time limited and situation/question specific  During encounter no one was surrogate medical decision-maker  Outcome of goals of care meeting:  Await MRI brain  Awaiting call back from son  Continue current medical management   Code status Full Code  Advanced Directives: no POA or living will in [facility]  Surrogate/Legal NOK:  Esthelaselwyn Davis (Child) [phone] [place] (Child) [phone]     Spiritual assessment: no spiritual distress identified  Bereavement and grief: to be determined  Referrals to: none today    Thank you for the opportunity to participate in the care of Peterson Mayer. ANGELITA Herman CNP  Palliative Medicine     SUBJECTIVE:     Details of Conversation:   Chart reviewed. Patient seen sitting up in wheelchair. Alert and confused. Call placed to son, Gloria Woodard, with no answer. Left message and awaiting a callback. Will follow for ongoing goals of care and CODE STATUS discussions as well as support for the patient and family. Prognosis: Guarded    OBJECTIVE:     BP (!) 154/71   Pulse 61   Temp 98.1 °F (36.7 °C) (Oral)   Resp 20   Ht 5' 7\" (1.702 m)   Wt 115 lb 1.6 oz (52.2 kg)   SpO2 95%   BMI 18.03 kg/m²     Physical Examination: Per chart reviewed    Gen: awake, alert, confused  HEENT: normocephalic, atraumatic, PERRL, EOMI,   Lungs: Expiratory wheezes, on BiPAP   heart: regular rate and rhythm, distant heart tones,   Abdomen: normoactive bowel sounds, soft, non-tender  Extremities: Right foot erythema and edema   skin: warm, dry without rashes, lesions, bruising  Neuro: Left-sided hemiparesis and left-sided lower facial droop    Objective data reviewed: labs, images, records, medication use, vitals, and chart    Time/Communication  Greater than 50% of time spent, total 15 minutes in counseling and coordination of care at the bedside regarding  CODE STATUS discussion and goals of care. Thank you for allowing Palliative Medicine to participate in the care of Peterson Mayer. Note: This report was completed using computerPeerSpace voiced recognition software. Every effort has been made to ensure accuracy; however, inadvertent computerized transcription errors may be present.

## 2022-07-29 NOTE — PROGRESS NOTES
Subjective:  Chart reviewed  Patient not in her room and she was taken out to the nurses station to be watched, she was trying to leave the floor and is delusional/hallucinating per nursing report looking for the people trying to steal her identity  Unable to obtain meaningful ROS    Objective:    BP (!) 154/71   Pulse 61   Temp 98.1 °F (36.7 °C) (Oral)   Resp 20   Ht 5' 7\" (1.702 m)   Wt 115 lb 1.6 oz (52.2 kg)   SpO2 95%   BMI 18.03 kg/m²     General: NAD, thin  HEENT: left facial droop, mucosa dry without ulcerations,thrush or mucositis, EOMI, sclera anicteric, conjuntiva pink  NECK: supple  Heart:  RRR  Lungs:  respirations easy and nonlabored on O2 via NC  Extrem:  trace, decreased in size  Skin: pale, Intact, no petechia or purpura  Neuro: left sided weakness in arm/leg    CBC with Differential:    Lab Results   Component Value Date/Time    WBC 11.9 07/29/2022 05:38 AM    RBC 4.96 07/29/2022 05:38 AM    HGB 14.9 07/29/2022 05:38 AM    HCT 47.6 07/29/2022 05:38 AM    PLT 91 07/29/2022 05:38 AM    MCV 96.0 07/29/2022 05:38 AM    MCH 30.0 07/29/2022 05:38 AM    MCHC 31.3 07/29/2022 05:38 AM    RDW 15.7 07/29/2022 05:38 AM    NRBC 0.9 07/12/2022 05:54 PM    LYMPHOPCT 12.2 07/29/2022 05:38 AM    MONOPCT 13.8 07/29/2022 05:38 AM    BASOPCT 0.2 07/29/2022 05:38 AM    MONOSABS 1.65 07/29/2022 05:38 AM    LYMPHSABS 1.46 07/29/2022 05:38 AM    EOSABS 0.00 07/29/2022 05:38 AM    BASOSABS 0.02 07/29/2022 05:38 AM     CMP:    Lab Results   Component Value Date/Time     07/29/2022 05:38 AM    K 4.1 07/29/2022 05:38 AM    K 6.4 07/25/2022 02:41 PM    CL 94 07/29/2022 05:38 AM    CO2 37 07/29/2022 05:38 AM    BUN 27 07/29/2022 05:38 AM    CREATININE 0.8 07/29/2022 05:38 AM    GFRAA >60 07/29/2022 05:38 AM    LABGLOM >60 07/29/2022 05:38 AM    GLUCOSE 100 07/29/2022 05:38 AM    PROT 5.5 07/28/2022 06:05 AM    LABALBU 3.1 07/28/2022 06:05 AM    CALCIUM 8.4 07/29/2022 05:38 AM    BILITOT 1.2 07/28/2022 06:05 AM Sissy Chowdhury,     enoxaparin Sodium (LOVENOX) injection 30 mg, 30 mg, SubCUTAneous, Daily, Laurie Pepe MD, 30 mg at 07/29/22 8315    ipratropium-albuterol (DUONEB) nebulizer solution 1 ampule, 1 ampule, Inhalation, Q4H WA, Laurie Pepe MD, 1 ampule at 07/29/22 1218    aspirin EC tablet 81 mg, 81 mg, Oral, Daily, Laurie Pepe MD, 81 mg at 07/29/22 0912    clopidogrel (PLAVIX) tablet 75 mg, 75 mg, Oral, Daily, Laurie Pepe MD, 75 mg at 07/29/22 0908    rosuvastatin (CRESTOR) tablet 20 mg, 20 mg, Oral, Nightly, Laurie Pepe MD, 20 mg at 07/28/22 2033    MRI BRAIN W WO CONTRAST   Final Result   1. Acute or early subacute infarctions in the right frontal lobe and the left   occipital lobe. 2. No hemorrhage, significant mass effect or midline shift. RECOMMENDATIONS:   Unavailable         XR CHEST PORTABLE   Final Result   Likely scarring or chronic atelectasis at the left lower lobe. Cardiomegaly. Obstructive airways disease. US DUP UPPER EXTREMITY RIGHT VENOUS   Final Result   Within the visualized vessels there is no evidence for deep venous   thrombosis               XR ABDOMEN (KUB) (SINGLE AP VIEW)   Final Result   No active process. Very small amount of residual contrast in the distal   small bowel and right colon. US DUP LOWER EXTREMITY RIGHT ARTERIES   Final Result   No evidence of peripheral arterial disease with normal waveforms and   velocities throughout the right lower extremity arterial system. RECOMMENDATIONS:   Unavailable         US DUP LOWER EXTREMITY RIGHT YAMILET   Final Result   No evidence of DVT in the right lower extremity. CT ABDOMEN PELVIS WO CONTRAST Additional Contrast? None   Final Result   1. The overall study is slightly suboptimal without the administration of IV   and oral contrast.      2.  Suspicion for left lower lobe pneumonia.       3.  Cardiomegaly again noted, when compared to the prior CT scan of the   abdomen and pelvis performed 07/15/2022 and CTA of the chest performed 1 day   previously. 4.  Partially seen right breast mass again noted. 5.  Vicarious excretion of IV contrast from the patient's CTA performed 07/25   into the gallbladder. 6.  Relatively bright enhancement of the renal parenchyma bilaterally,   indicating delayed excretion of injected contrast from the CTA performed 1   day prior. IV contrast is noted within the ureters and urinary bladder. The   findings indicate mild renal failure. 7.  Small to moderate amount of abdominal and pelvic ascites. CTA PULMONARY W CONTRAST   Final Result   1. Redemonstration of right breast mass with right axillary lymphadenopathy. 2. No evidence of pulmonary embolism. 3. Redemonstration of multiple enlarged mediastinal lymph nodes notable in   lower paratracheal location. 4. Moderate cardiomegaly with findings suggesting right heart failure. 5. Stable atelectatic changes in left lung base. 6. Peribronchial thickening bilaterally suggesting inflammation with sites of   bronchial stenosis notable in posterior segments of the lower lobes. CT Head WO Contrast   Final Result      CTA NECK W CONTRAST   Final Result   CT PERFUSION OF THE HEAD:      1. Area of ischemia is seen in the right parietal lobe, corresponding to the   site of edema identified on the CT of the head. CTA OF THE HEAD:      1. No major arterial stenosis is seen in the brain. CTA OF THE NECK:      1. Approximately 70% stenosis in the proximal right ICA. 2. Less than 50% stenosis in the proximal left ICA. 3. Approximately 50% stenosis of the origin of the left vertebral artery. 4. No significant stenosis in the right vertebral artery. RECOMMENDATIONS:   Unavailable         CTA HEAD W CONTRAST   Final Result   CT PERFUSION OF THE HEAD:      1.  Area of ischemia is seen in the right parietal lobe, corresponding to the   site of edema identified on the CT of the head. CTA OF THE HEAD:      1. No major arterial stenosis is seen in the brain. CTA OF THE NECK:      1. Approximately 70% stenosis in the proximal right ICA. 2. Less than 50% stenosis in the proximal left ICA. 3. Approximately 50% stenosis of the origin of the left vertebral artery. 4. No significant stenosis in the right vertebral artery. RECOMMENDATIONS:   Unavailable         CT BRAIN PERFUSION   Final Result   CT PERFUSION OF THE HEAD:      1. Area of ischemia is seen in the right parietal lobe, corresponding to the   site of edema identified on the CT of the head. CTA OF THE HEAD:      1. No major arterial stenosis is seen in the brain. CTA OF THE NECK:      1. Approximately 70% stenosis in the proximal right ICA. 2. Less than 50% stenosis in the proximal left ICA. 3. Approximately 50% stenosis of the origin of the left vertebral artery. 4. No significant stenosis in the right vertebral artery. RECOMMENDATIONS:   Unavailable           79 yo female with recent breast biopsy positive for invasive poorly differentiated carcinoma with squamous differentiation, ER/IA/HER2 negative, HARSHAD-3 positive suggestive of breat primary, morphology described as spindle cell and keratinizing squamous differentiation. Right axillary node with similar report. CT chest significant for mediastinal adenopathy. Additional diagnoses CHF (BNP 01218), acute hypercapnic respiratory failure, LFTs elevated, urine with moderate bacteria, possible pneumonia, DIANN and agitation.     A/P:  -triple negative poorly differentiated carcinoma of breast with squamous differentiation indicating an aggressive phenotype.  -thrombocytopenia, slight decline, follow CBC diff plt daily  -right frontal and left occipital infarcts concerning for embolic source per neurology recommendations would agree with AUDREY, eliquis started, d/c lovenox, risk of hypercoagulable condition with underlying malignancy -fluctuating mental status, work-up and treatment per neurology    Patient appears with metastatic disease with local and distant adenopathy. Patient was agreeable to completing treatment for acute issues and seeing how her performance status improves and following up outpatient in a couples weeks with Dr. Pau Ngo. We will continue to follow supportively. Thank you for allowing us to participate in the care of Peterson Mayer. Bronwyn Toledo PA-C  [phone]    Electronically signed by ALLA Fernandez on 7/29/2022 at 2:36 PM    Note: This report was completed using [employer] voiced recognition software. Every effort has been made to ensure accuracy; however, inadvertent computerized transcription errors may be present.

## 2022-07-29 NOTE — PROGRESS NOTES
Physical Therapy  Physical Therapy Initial Assessment     Name: Abi Su  : 1955  MRN: [id number]      Date of Service: 2022    Evaluating PT:  Domenico Ibanez PT, DPT [id number]    Room #:  9954/4875-M  Diagnosis:  Hyperkalemia [E87.5]  Acute ischemic stroke [facility]) [I63.9]  Acute kidney injury ([facility] [address] [N17.9]  Acute respiratory failure with hypoxia and hypercapnia ([facility] [J96.01, J96.02]  Cerebrovascular accident (CVA), unspecified mechanism ([facility] [address] [I63.9]  PMHx/PSHx:    Past Medical History:   Diagnosis Date    Anxiety     Arthritis     Cancer ([facility] [address]     Chronic bronchitis ([facility] [address]     Emphysema     according to xray report 2013    Walking pneumonia      Precautions:  Fall risk, Alarms, Cognition, L hemiparesis, Mets  Equipment Needs:  TBD    SUBJECTIVE:  *Subjective information limited secondary to confusion  Pt lives with her [de-identified] year old grandson but states that she will be discharging to her sisters home where she will have first floor living accommodations. OBJECTIVE:   Initial Evaluation  Date: 22 Treatment Short Term/ Long Term   Goals   AM-PAC 6 Clicks 69/46     Was pt agreeable to Eval/treatment? Yes     Does pt have pain? No     Bed Mobility  Rolling: NT  Supine to sit: Min A  Sit to supine: SBA  Scooting: Mod A  Rolling: Independent  Supine to sit: Independent  Sit to supine: Independent  Scooting: Independent   Transfers Sit to stand:  Mod A  Stand to sit: Mod A  Stand pivot: NT  Sit to stand: Supervision  Stand to sit: Supervision  Stand pivot: Supervision   Ambulation    NT   50 feet with AAD supervision   Stair negotiation: ascended and descended  NT  2 steps with bilateral rail SBA   ROM BUE:  Refer to OT  BLE:  WFL     Strength BUE:  Refer to OT  BLE:  4/5  4+/5   Balance Sitting EOB:  Mod A static and dynamic  Dynamic Standing:  NT  Sitting EOB:  Independent  Dynamic Standing:  Supervision     Pt is A & O x 2 (self and month, year with increased time and self correction)   Poor insight to situation, substantial confusion   Sensation:  Pt describes numbness and tingling to left hand  Edema:  none  Coordination: unable to accurately assess due to lack of attention and confusion    Vitals: WNLs O2 100% throughout    Patient education  Pt educated on role and benefits of physical therapy, safety and technique for mobility    Patient response to education:   Pt verbalized understanding Pt demonstrated skill Pt requires further education in this area   x x x     ASSESSMENT:    Conditions Requiring Skilled Therapeutic Intervention:    [x]Decreased strength     []Decreased ROM  [x]Decreased functional mobility  [x]Decreased balance   [x]Decreased endurance   [x]Decreased posture  [x]Decreased sensation  [x]Decreased coordination   []Decreased vision  [x]Decreased safety awareness   []Increased pain       Comments:  Patient deemed medically stable prior to therapy evaluation. Patient was supine in bed upon therapy arrival. Patient provided consent to evaluation and OT PT collaboration. Patient demonstrated significant lack of insight to her physical condition as well as disorientation throughout leading to mild-moderate impulsivity and lack of safety. Maximal Vcs for posturing EOB, as patient did not acknowledged increased posterior, right lean. Left inattention was also observed, as patient could not identify bath wipe in hand for self cleaning. Sit to stand transfer successfully completed with physical assist but bilateral knee block required following >1min static standing due to buckling; when addressed patient stated she did so intentionally to \"see what I can do still\". Patient left supine in bed with all needs met and call light in reach for safety. Several requests for her phone which was placed on patient's lap for improved acknowledgement of it's location.    She would benefit from continued skilled rehab following DC in order to improve her tolerance to position change and functional mobility. Treatment:  Patient practiced and was instructed in the following treatment:    Bed mobility training - pt given verbal and tactile cues to facilitate proper sequencing and safety during rolling and supine>sit as well as provided with physical assistance to complete task   Sitting EOB for >10 minutes for upright tolerance, postural awareness and BLE ROM  Transfer training - pt was given verbal and tactile cues to facilitate proper hand placement, technique and safety during sit to stand and stand to sit as well as provided with physical assistance. Pt's/ family goals   1. Return to PLOF    Prognosis is fair for reaching above PT goals. Patient and or family understand(s) diagnosis, prognosis, and plan of care.   Yes    PHYSICAL THERAPY PLAN OF CARE:    PT POC is established based on physician order and patient diagnosis     Referring provider/PT Order:      PT eval and treat  Start:  07/29/22 1115,   End:  07/29/22 1115,   ONE TIME,   Standing Count:  1 Occurrences,   R         Tia Enriquez MD     Diagnosis:  Hyperkalemia [E87.5]  Acute ischemic stroke [facility]) [I63.9]  Acute kidney injury ([facility] [address] [N17.9]  Acute respiratory failure with hypoxia and hypercapnia ([address] [J96.01, J96.02]  Cerebrovascular accident (CVA), unspecified mechanism ([address] [I63.9]  Specific instructions for next treatment:  attempt to advance transfers and short distance ambulation    Current Treatment Recommendations:     [x] Strengthening to improve independence with functional mobility   [] ROM to improve independence with functional mobility   [x] Balance Training to improve static/dynamic balance and to reduce fall risk  [x] Endurance Training to improve activity tolerance during functional mobility   [x] Transfer Training to improve safety and independence with all functional transfers   [x] Gait Training to improve gait mechanics, endurance and assess need for appropriate assistive device  [x] Stair Training in preparation for safe discharge home and/or into the community   [x] Positioning to prevent skin breakdown and contractures  [x] Safety and Education Training   [x] Patient/Caregiver Education   [] HEP  [] Other     PT long term treatment goals are located in above grid    Frequency of treatments: 2-5x/week x 1-2 weeks. Time in  1130  Time out  1200    Total Treatment Time  15 minutes     Evaluation Time includes thorough review of current medical information, gathering information on past medical history/social history and prior level of function, completion of standardized testing/informal observation of tasks, assessment of data and education on plan of care and goals.     CPT codes:  [] Low Complexity PT evaluation 25228  [] Moderate Complexity PT evaluation 57965  [] High Complexity PT evaluation 27794  [] PT Re-evaluation 62459  [] Gait training 50878 - minutes  [] Manual therapy 32039 - minutes  [] Therapeutic activities 08342 15 minutes  [] Therapeutic exercises 63758 - minutes  [] Neuromuscular reeducation 84570 - minutes     Annalisa Kaplan, PT, DPT [id number]

## 2022-07-29 NOTE — PROGRESS NOTES
better, has no chest pain, cough or shortness of breath, in no distress in room air  She said she has some abdomen discomfort and attributed that to is drink a lot of orange juice, no nausea, vomiting or diarrhea  She would like to have her Valencia catheter out    records reviewed. Stable overnight. No other overnight issues reported. Temp (24hrs), Av.7 °F (36.5 °C), Min:97.2 °F (36.2 °C), Max:98.1 °F (36.7 °C)    DIET: ADULT DIET; Regular; Low Sodium (2 gm)  CODE: Prior    Intake/Output Summary (Last 24 hours) at 2022 0811  Last data filed at 2022 0703  Gross per 24 hour   Intake 340 ml   Output 850 ml   Net -510 ml       OBJECTIVE:    BP (!) 154/71   Pulse 61   Temp 98.1 °F (36.7 °C) (Oral)   Resp 20   Ht 5' 7\" (1.702 m)   Wt 115 lb 1.6 oz (52.2 kg)   SpO2 100%   BMI 18.03 kg/m²     General appearance: No apparent distress, appears stated age and cooperative. HEENT: Normal cephalic, atraumatic without obvious deformity. Pupils equal, round, and reactive to light. Extra ocular muscles intact. Conjunctivae/corneas clear. Neck: Supple, with full range of motion. No jugular venous distention. Trachea midline. Respiratory: Creased respiratory effort, on BiPAP, expiratory wheezing, no crackles  Cardiovascular: Bradycardic, no murmur gallops or rubs  Abdomen: Nondistended, normal BS, nontender, no visceromegaly  Musculoskeletal: No clubbing, cyanosis, right foot erythema and edema. Brisk capillary refill. Skin: Normal skin color. No rashes or lesions. Neurologic:  Neurovascularly intact without any focal sensory/motor deficits. Cranial nerves: II-XII intact, grossly non-focal.  Left-sided hemiparesis and left-sided lower facial droop  Psychiatric: Alert and oriented, thought content appropriate, normal insight       ASSESSMENT:    Acute ischemic stroke. MRI : Acute or early subacute infarctions in the right frontal lobe and the left occipital lobe.   Patient is already on aspirin CREATININE 0.9 0.9  --  0.8   CALCIUM 9.1 8.5*  --  8.4*       Recent Labs     07/27/22  0611 07/28/22  0605   PROT 6.7 5.5*   ALKPHOS 99 77   * 175*   AST 89* 43*   BILITOT 2.1* 1.2       No results for input(s): INR in the last 72 hours. No results for input(s): Jung Gey in the last 72 hours. Chronic labs:    Lab Results   Component Value Date    CHOL 170 07/26/2022    TRIG 121 07/26/2022    HDL 36 07/26/2022    LDLCALC 110 (H) 07/26/2022    TSH 2.510 07/14/2022    INR 2.0 07/25/2022    LABA1C 5.8 (H) 07/26/2022       Radiology: REVIEWED DAILY    +++++++++++++++++++++++++++++++++++++++++++++++++  Leslie Feliciano MD  [facility] - [address] [place] [address]  +++++++++++++++++++++++++++++++++++++++++++++++++  NOTE: This report was transcribed using voice recognition software. Every effort was made to ensure accuracy; however, inadvertent computerized transcription errors may be present.

## 2022-07-30 LAB
ANION GAP SERPL CALCULATED.3IONS-SCNC: 14 MMOL/L (ref 7–16)
ANISOCYTOSIS: ABNORMAL
BASOPHILS ABSOLUTE: 0 E9/L (ref 0–0.2)
BASOPHILS RELATIVE PERCENT: 0.1 % (ref 0–2)
BUN BLDV-MCNC: 23 MG/DL (ref 6–23)
CALCIUM SERPL-MCNC: 8.6 MG/DL (ref 8.6–10.2)
CHLORIDE BLD-SCNC: 95 MMOL/L (ref 98–107)
CO2: 30 MMOL/L (ref 22–29)
CREAT SERPL-MCNC: 0.7 MG/DL (ref 0.5–1)
EOSINOPHILS ABSOLUTE: 0 E9/L (ref 0.05–0.5)
EOSINOPHILS RELATIVE PERCENT: 0.2 % (ref 0–6)
GFR AFRICAN AMERICAN: >60
GFR NON-AFRICAN AMERICAN: >60 ML/MIN/1.73
GLUCOSE BLD-MCNC: 82 MG/DL (ref 74–99)
HCT VFR BLD CALC: 52.5 % (ref 34–48)
HEMOGLOBIN: 16.4 G/DL (ref 11.5–15.5)
LYMPHOCYTES ABSOLUTE: 0.38 E9/L (ref 1.5–4)
LYMPHOCYTES RELATIVE PERCENT: 2.6 % (ref 20–42)
MCH RBC QN AUTO: 29.9 PG (ref 26–35)
MCHC RBC AUTO-ENTMCNC: 31.2 % (ref 32–34.5)
MCV RBC AUTO: 95.6 FL (ref 80–99.9)
METER GLUCOSE: 78 MG/DL (ref 74–99)
MONOCYTES ABSOLUTE: 0.88 E9/L (ref 0.1–0.95)
MONOCYTES RELATIVE PERCENT: 7 % (ref 2–12)
NEUTROPHILS ABSOLUTE: 11.25 E9/L (ref 1.8–7.3)
NEUTROPHILS RELATIVE PERCENT: 90.4 % (ref 43–80)
PDW BLD-RTO: 15.1 FL (ref 11.5–15)
PLATELET # BLD: 84 E9/L (ref 130–450)
PLATELET CONFIRMATION: NORMAL
PMV BLD AUTO: 10 FL (ref 7–12)
POTASSIUM SERPL-SCNC: 4.1 MMOL/L (ref 3.5–5)
RBC # BLD: 5.49 E12/L (ref 3.5–5.5)
SODIUM BLD-SCNC: 139 MMOL/L (ref 132–146)
WBC # BLD: 12.5 E9/L (ref 4.5–11.5)

## 2022-07-30 PROCEDURE — 82962 GLUCOSE BLOOD TEST: CPT

## 2022-07-30 PROCEDURE — 94660 CPAP INITIATION&MGMT: CPT

## 2022-07-30 PROCEDURE — 6370000000 HC RX 637 (ALT 250 FOR IP): Performed by: STUDENT IN AN ORGANIZED HEALTH CARE EDUCATION/TRAINING PROGRAM

## 2022-07-30 PROCEDURE — 2580000003 HC RX 258: Performed by: STUDENT IN AN ORGANIZED HEALTH CARE EDUCATION/TRAINING PROGRAM

## 2022-07-30 PROCEDURE — 6360000002 HC RX W HCPCS

## 2022-07-30 PROCEDURE — 6370000000 HC RX 637 (ALT 250 FOR IP): Performed by: PHYSICIAN ASSISTANT

## 2022-07-30 PROCEDURE — 94640 AIRWAY INHALATION TREATMENT: CPT

## 2022-07-30 PROCEDURE — 6360000002 HC RX W HCPCS: Performed by: STUDENT IN AN ORGANIZED HEALTH CARE EDUCATION/TRAINING PROGRAM

## 2022-07-30 PROCEDURE — 36415 COLL VENOUS BLD VENIPUNCTURE: CPT

## 2022-07-30 PROCEDURE — 85025 COMPLETE CBC W/AUTO DIFF WBC: CPT

## 2022-07-30 PROCEDURE — 2060000000 HC ICU INTERMEDIATE R&B

## 2022-07-30 PROCEDURE — 80048 BASIC METABOLIC PNL TOTAL CA: CPT

## 2022-07-30 RX ORDER — ASPIRIN 81 MG/1
81 TABLET, CHEWABLE ORAL DAILY
Status: DISCONTINUED | OUTPATIENT
Start: 2022-07-30 | End: 2022-08-04 | Stop reason: HOSPADM

## 2022-07-30 RX ADMIN — APIXABAN 5 MG: 5 TABLET, FILM COATED ORAL at 10:40

## 2022-07-30 RX ADMIN — IPRATROPIUM BROMIDE AND ALBUTEROL SULFATE 1 AMPULE: .5; 2.5 SOLUTION RESPIRATORY (INHALATION) at 12:09

## 2022-07-30 RX ADMIN — IPRATROPIUM BROMIDE AND ALBUTEROL SULFATE 1 AMPULE: .5; 2.5 SOLUTION RESPIRATORY (INHALATION) at 16:44

## 2022-07-30 RX ADMIN — IPRATROPIUM BROMIDE AND ALBUTEROL SULFATE 1 AMPULE: .5; 2.5 SOLUTION RESPIRATORY (INHALATION) at 09:05

## 2022-07-30 RX ADMIN — ASPIRIN 81 MG: 81 TABLET, COATED ORAL at 10:40

## 2022-07-30 RX ADMIN — ARFORMOTEROL TARTRATE 15 MCG: 15 SOLUTION RESPIRATORY (INHALATION) at 21:52

## 2022-07-30 RX ADMIN — APIXABAN 5 MG: 5 TABLET, FILM COATED ORAL at 21:32

## 2022-07-30 RX ADMIN — BUDESONIDE 500 MCG: 0.5 SUSPENSION RESPIRATORY (INHALATION) at 21:53

## 2022-07-30 RX ADMIN — CEFTRIAXONE 1000 MG: 1 INJECTION, POWDER, FOR SOLUTION INTRAMUSCULAR; INTRAVENOUS at 14:11

## 2022-07-30 RX ADMIN — LORAZEPAM 1 MG: 1 TABLET ORAL at 23:29

## 2022-07-30 RX ADMIN — BUDESONIDE 500 MCG: 0.5 SUSPENSION RESPIRATORY (INHALATION) at 09:05

## 2022-07-30 RX ADMIN — IPRATROPIUM BROMIDE AND ALBUTEROL SULFATE 1 AMPULE: .5; 2.5 SOLUTION RESPIRATORY (INHALATION) at 21:51

## 2022-07-30 RX ADMIN — ROSUVASTATIN 20 MG: 20 TABLET, FILM COATED ORAL at 21:33

## 2022-07-30 RX ADMIN — Medication 5 MG: at 21:33

## 2022-07-30 RX ADMIN — ARFORMOTEROL TARTRATE 15 MCG: 15 SOLUTION RESPIRATORY (INHALATION) at 09:05

## 2022-07-30 NOTE — PROGRESS NOTES
Hospitalist Progress Note      SYNOPSIS: Patient admitted on 7/25/2022     60-year-old female patient from home with medical history of severe pulmonary hypertension, tobacco use, right breast cancer, COPD, MDD, hypertension who was brought to the ED with concern for stroke. Patient last known normal well was around midnight last night. Family found her today around noon with left-sided weakness and facial droop. Upon EMS arrival at the scene patient had saturation of 56% on room air and placed on nonrebreather mask. Patient was recently diagnosed with breast cancer 10 days ago for which she was discharged from this facility. No report of fever, cough, CP, abdominal pain, n/v.   Stroke alert was activated on arrival to ED. CT scanning of the head showed subacute infarction described as area of ischemia in the right parietal and left occipital lobes. No hemorrhagic, significant mild static, or midline shift. At CTA of the head and neck obtained with no major intracranial arterial stenosis but 70% stenosis of the right proximal ICA. Telestroke was consulted and patient was out of the window for tPA given completed infarct, also per neuro recommendation patient with likely hypercoagulable state this ICA daily managed medically, with DAPT. Patient was given aspirin on the ED  Remaining ED work-up showing normal white blood cells, hemoglobin, elevated hematocrit, potassium 6.4, bicarb 32, BUN 73, creatinine 2.1, ABG showing pH 7.23, PCO2 76.7, PO2 202, troponin 66, and proBNP 17,428. EKG sinus bradycardia 59, normal QT, no ST segment changes. CTA pulmonary with contrast with no evidence of PE.   Redemonstration of right breast mass with right axillary lymphadenopathy and multiple enlarged mediastinal lymph nodes  Patient was admitted for further management of subacute stroke, acute hypercapnic and hypoxic respiratory failure     SUBJECTIVE:    Patient seen and examined at bedside, still in restraints again but now off restraints. Per care nurse patient had a little bit of confusion, but refused wearing BiPAP, but after few hours on BiPAP she improved  Patient denies chest pain, ongoing shortness of breath, she is comfortable on room air  records reviewed. Stable overnight. No other overnight issues reported. Temp (24hrs), Av.9 °F (36.1 °C), Min:96.7 °F (35.9 °C), Max:97.1 °F (36.2 °C)    DIET: ADULT DIET; Regular; Low Sodium (2 gm)  CODE: Prior    Intake/Output Summary (Last 24 hours) at 2022 1631  Last data filed at 2022 1608  Gross per 24 hour   Intake 150 ml   Output 1100 ml   Net -950 ml       OBJECTIVE:    /67   Pulse 83   Temp (!) 96.7 °F (35.9 °C) (Temporal)   Resp 19   Ht 5' 7\" (1.702 m)   Wt 115 lb 1.6 oz (52.2 kg)   SpO2 90%   BMI 18.03 kg/m²     General appearance: No apparent distress, appears stated age and cooperative. HEENT: Normal cephalic, atraumatic without obvious deformity. Pupils equal, round, and reactive to light. Extra ocular muscles intact. Conjunctivae/corneas clear. Neck: Supple, with full range of motion. No jugular venous distention. Trachea midline. Respiratory: Creased respiratory effort, on BiPAP, expiratory wheezing, no crackles  Cardiovascular: Bradycardic, no murmur gallops or rubs  Abdomen: Nondistended, normal BS, nontender, no visceromegaly  Musculoskeletal: No clubbing, cyanosis, right foot erythema and edema. Brisk capillary refill. Skin: Normal skin color. No rashes or lesions. Neurologic:  Neurovascularly intact without any focal sensory/motor deficits. Cranial nerves: II-XII intact, grossly non-focal.  Left-sided hemiparesis and left-sided lower facial droop  Psychiatric: Alert and oriented, thought content appropriate, normal insight       ASSESSMENT:    Acute ischemic stroke. MRI : Acute or early subacute infarctions in the right frontal lobe and the left occipital lobe. Patient is already on aspirin and Plavix.   Neurology consulted from admission and recommendations appreciated. As patient has multiple location of anterior and posterior stroke embolic source is suspected. Neurology recommending AUDREY if oncology does not feel that this is a result of a hypercoagulable state, otherwise neurology recommended just to start oral anticoagulants and no further work-up indicated. Patient to be started on Eliquis. Therefore Plavix will be discontinued    Acute hypoxic and hypercapnic respiratory failure. With a history of COPD. Mild exacerbation on admission. Received [place] of the steroids and DuoNeb's. Resolved  Hyper kalemia in the course of DIANN and dehydration. Resolved  Acute respiratory acidosis  DIANN  Elevated troponin in the setting of acute respiratory failure and a stroke. -unlikely ACS, likely demand ischemia  Recent diagnosis of right breast cancer. Status post ultrasound-guided core biopsy of the right axillary lymphadenopathy. 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 Oncology following. Additional tissue diagnosis  Severe pulmonary hypertension  History of tobacco use  History of MDD/and anxiety  Hypertension  Right foot edema. Right lower extremity venous Doppler negative for DVT. A right lower extremity arterial Doppler normal  Sinus bradycardia ( Hypotension and bradycardia. Cosyntropin stimulation test showed normal cortisol but obtained throughout steroids on ED  Deconditioning, impaired gait. Patient needs wheelchair for safety transportation. A cane or a walker are not safe tools at this moment. Increased risk for falls       PLAN:    -Breathing treatments due neb scheduled. Off steroids. Pulmonology following  -BiPAP QHS and daytime as needed   -Continue aspirin. Start Plavix.   Statins  -Lovenox for DVT prophylaxis  -Sinew to monitor renal function and potassium  -Consult oncology for recommendations  - Palliative care consult for Alicia Le, code status  -ceftriaxone for UTI. Urine culture sent. No growth however sent after she was already on antibiotics. Will complete 5 days of treatment  -PT OT and social work recommendation for discharge. Patient and family has opted for going home and to go to outpatient physical therapy  -Proper equipment for home has been ordered. However family has changed their mind and patient now to go to subacute rehab       DISPOSITION: For subacute rehab. Needs pre-CERT    Medications:  REVIEWED DAILY    Infusion Medications    dextrose      dextrose       Scheduled Medications    aspirin  81 mg Oral Daily    apixaban  5 mg Oral BID    cefTRIAXone (ROCEPHIN) IV  1,000 mg IntraVENous Q24H    budesonide  500 mcg Nebulization BID    Arformoterol Tartrate  15 mcg Nebulization BID    melatonin  5 mg Oral Nightly    ipratropium-albuterol  1 ampule Inhalation Q4H WA    rosuvastatin  20 mg Oral Nightly     PRN Meds: LORazepam, glucose, dextrose bolus **OR** dextrose bolus, glucagon (rDNA), dextrose, glucose, dextrose bolus **OR** dextrose bolus, glucagon (rDNA), dextrose, [COMPLETED] insulin regular **AND** [COMPLETED] dextrose **AND** POCT Glucose **AND** POCT Glucose **AND** dextrose    Labs:     Recent Labs     07/28/22  1728 07/29/22  0538 07/30/22  0543   WBC 12.0* 11.9* 12.5*   HGB 15.0 14.9 16.4*   HCT 49.1* 47.6 52.5*   * 91* 84*       Recent Labs     07/28/22  0605 07/28/22  1003 07/29/22  0538 07/30/22  0543     --  138 139   K 4.5 4.33 4.1 4.1   CL 95*  --  94* 95*   CO2 38*  --  37* 30*   BUN 37*  --  27* 23   CREATININE 0.9  --  0.8 0.7   CALCIUM 8.5*  --  8.4* 8.6       Recent Labs     07/28/22  0605   PROT 5.5*   ALKPHOS 77   *   AST 43*   BILITOT 1.2       No results for input(s): INR in the last 72 hours. No results for input(s): Lorrie Fabian in the last 72 hours.       Chronic labs:    Lab Results   Component Value Date CHOL 170 07/26/2022    TRIG 121 07/26/2022    HDL 36 07/26/2022    LDLCALC 110 (H) 07/26/2022    TSH 2.510 07/14/2022    INR 2.0 07/25/2022    LABA1C 5.8 (H) 07/26/2022       Radiology: REVIEWED DAILY    +++++++++++++++++++++++++++++++++++++++++++++++++  Hyacinth Paez MD  [facility] Physician - [address] [place] [address] Rd, [address]  +++++++++++++++++++++++++++++++++++++++++++++++++  NOTE: This report was transcribed using voice recognition software. Every effort was made to ensure accuracy; however, inadvertent computerized transcription errors may be present.

## 2022-07-30 NOTE — PLAN OF CARE
Notes reviewed--- intermittently combative since last night. Vital signs are stable at present on room air. Complete echo 7/13:  Ejection fraction is visually estimated at 50%. Dilated right ventricle and reduced right ventricular function (TAPSE 1.0 cm). Indeterminate diastolic function. Severely dilated right atrium. Mild mitral regurgitation. Mild aortic regurgitation. Moderate tricuspid regurgitation. RV-RA gradient is estimated at 58 mmHg. Assessment  Right frontal and left occipital lobe infarcts concerning for embolic source  Given her newly diagnosed metastatic breast CA, hypercoagulability is a possibility and a probable mechanism for this stroke. She does have 70% BUTCH stenosis and 50 % LICA              Echo showed a dilated right atrium. No Afib seen on telemetry    Breast cancer   With possible mets disease   risk of hypercoagulable condition with underlying malignancy     Plan  Continue supportive care  Will defer AUDREY for now given hematology agrees that cancer with mets could be contributing to hypercoagulable condition and patient has been started on Eliquis  Discontinuation of Plavix on initiation of Eliquis   Continue on ASA given her carotid disease    Continue statin  A1c 5.8,   Continue PT/OT/ST  Stroke risk factor modifications  SCDs  Stroke education    Okay to discharge from neuro standpoint once medically cleared and okay with others. Neurology will sign off. Stroke clinic in 1 to 2 weeks.

## 2022-07-30 NOTE — PROGRESS NOTES
Patient continues to refuse bipap at this time for this nurse and was educated on the importance of its use.

## 2022-07-30 NOTE — PROGRESS NOTES
Bilateral wrist restraints were removed at this time. Pt no longer continues to be combative and was able to wear Bipap.

## 2022-07-31 LAB
ANION GAP SERPL CALCULATED.3IONS-SCNC: 5 MMOL/L (ref 7–16)
BASOPHILS ABSOLUTE: 0.01 E9/L (ref 0–0.2)
BASOPHILS RELATIVE PERCENT: 0.1 % (ref 0–2)
BUN BLDV-MCNC: 32 MG/DL (ref 6–23)
CALCIUM SERPL-MCNC: 7.9 MG/DL (ref 8.6–10.2)
CHLORIDE BLD-SCNC: 99 MMOL/L (ref 98–107)
CO2: 30 MMOL/L (ref 22–29)
CREAT SERPL-MCNC: 0.7 MG/DL (ref 0.5–1)
EOSINOPHILS ABSOLUTE: 0.1 E9/L (ref 0.05–0.5)
EOSINOPHILS RELATIVE PERCENT: 0.9 % (ref 0–6)
GFR AFRICAN AMERICAN: >60
GFR NON-AFRICAN AMERICAN: >60 ML/MIN/1.73
GLUCOSE BLD-MCNC: 89 MG/DL (ref 74–99)
HCT VFR BLD CALC: 43.9 % (ref 34–48)
HEMOGLOBIN: 13.4 G/DL (ref 11.5–15.5)
IMMATURE GRANULOCYTES #: 0.04 E9/L
IMMATURE GRANULOCYTES %: 0.4 % (ref 0–5)
LYMPHOCYTES ABSOLUTE: 2.46 E9/L (ref 1.5–4)
LYMPHOCYTES RELATIVE PERCENT: 22.1 % (ref 20–42)
MCH RBC QN AUTO: 29.8 PG (ref 26–35)
MCHC RBC AUTO-ENTMCNC: 30.5 % (ref 32–34.5)
MCV RBC AUTO: 97.6 FL (ref 80–99.9)
MONOCYTES ABSOLUTE: 1.22 E9/L (ref 0.1–0.95)
MONOCYTES RELATIVE PERCENT: 11 % (ref 2–12)
NEUTROPHILS ABSOLUTE: 7.31 E9/L (ref 1.8–7.3)
NEUTROPHILS RELATIVE PERCENT: 65.5 % (ref 43–80)
PDW BLD-RTO: 15.6 FL (ref 11.5–15)
PLATELET # BLD: 80 E9/L (ref 130–450)
PLATELET CONFIRMATION: NORMAL
PMV BLD AUTO: 10.2 FL (ref 7–12)
POTASSIUM SERPL-SCNC: 4.6 MMOL/L (ref 3.5–5)
RBC # BLD: 4.5 E12/L (ref 3.5–5.5)
SODIUM BLD-SCNC: 134 MMOL/L (ref 132–146)
WBC # BLD: 11.1 E9/L (ref 4.5–11.5)

## 2022-07-31 PROCEDURE — 94640 AIRWAY INHALATION TREATMENT: CPT

## 2022-07-31 PROCEDURE — 80048 BASIC METABOLIC PNL TOTAL CA: CPT

## 2022-07-31 PROCEDURE — 6370000000 HC RX 637 (ALT 250 FOR IP): Performed by: STUDENT IN AN ORGANIZED HEALTH CARE EDUCATION/TRAINING PROGRAM

## 2022-07-31 PROCEDURE — 94660 CPAP INITIATION&MGMT: CPT

## 2022-07-31 PROCEDURE — 36415 COLL VENOUS BLD VENIPUNCTURE: CPT

## 2022-07-31 PROCEDURE — 85025 COMPLETE CBC W/AUTO DIFF WBC: CPT

## 2022-07-31 PROCEDURE — 2580000003 HC RX 258: Performed by: STUDENT IN AN ORGANIZED HEALTH CARE EDUCATION/TRAINING PROGRAM

## 2022-07-31 PROCEDURE — 2060000000 HC ICU INTERMEDIATE R&B

## 2022-07-31 PROCEDURE — 6370000000 HC RX 637 (ALT 250 FOR IP): Performed by: INTERNAL MEDICINE

## 2022-07-31 PROCEDURE — 6370000000 HC RX 637 (ALT 250 FOR IP): Performed by: NURSE PRACTITIONER

## 2022-07-31 PROCEDURE — 6370000000 HC RX 637 (ALT 250 FOR IP): Performed by: PHYSICIAN ASSISTANT

## 2022-07-31 PROCEDURE — 6360000002 HC RX W HCPCS

## 2022-07-31 PROCEDURE — 2700000000 HC OXYGEN THERAPY PER DAY

## 2022-07-31 PROCEDURE — 97530 THERAPEUTIC ACTIVITIES: CPT

## 2022-07-31 PROCEDURE — 6360000002 HC RX W HCPCS: Performed by: STUDENT IN AN ORGANIZED HEALTH CARE EDUCATION/TRAINING PROGRAM

## 2022-07-31 PROCEDURE — 97535 SELF CARE MNGMENT TRAINING: CPT

## 2022-07-31 RX ORDER — PANTOPRAZOLE SODIUM 40 MG/1
40 TABLET, DELAYED RELEASE ORAL ONCE
Status: COMPLETED | OUTPATIENT
Start: 2022-07-31 | End: 2022-07-31

## 2022-07-31 RX ORDER — PANTOPRAZOLE SODIUM 40 MG/1
40 TABLET, DELAYED RELEASE ORAL
Status: DISCONTINUED | OUTPATIENT
Start: 2022-08-01 | End: 2022-08-04 | Stop reason: HOSPADM

## 2022-07-31 RX ADMIN — IPRATROPIUM BROMIDE AND ALBUTEROL SULFATE 1 AMPULE: .5; 2.5 SOLUTION RESPIRATORY (INHALATION) at 08:15

## 2022-07-31 RX ADMIN — APIXABAN 5 MG: 5 TABLET, FILM COATED ORAL at 09:05

## 2022-07-31 RX ADMIN — IPRATROPIUM BROMIDE AND ALBUTEROL SULFATE 1 AMPULE: .5; 2.5 SOLUTION RESPIRATORY (INHALATION) at 20:43

## 2022-07-31 RX ADMIN — PANTOPRAZOLE SODIUM 40 MG: 40 TABLET, DELAYED RELEASE ORAL at 15:06

## 2022-07-31 RX ADMIN — APIXABAN 5 MG: 5 TABLET, FILM COATED ORAL at 21:00

## 2022-07-31 RX ADMIN — ARFORMOTEROL TARTRATE 15 MCG: 15 SOLUTION RESPIRATORY (INHALATION) at 08:15

## 2022-07-31 RX ADMIN — BUDESONIDE 500 MCG: 0.5 SUSPENSION RESPIRATORY (INHALATION) at 08:15

## 2022-07-31 RX ADMIN — WATER 1000 MG: 1 INJECTION INTRAMUSCULAR; INTRAVENOUS; SUBCUTANEOUS at 14:29

## 2022-07-31 RX ADMIN — ARFORMOTEROL TARTRATE 15 MCG: 15 SOLUTION RESPIRATORY (INHALATION) at 20:44

## 2022-07-31 RX ADMIN — BUDESONIDE 500 MCG: 0.5 SUSPENSION RESPIRATORY (INHALATION) at 20:45

## 2022-07-31 RX ADMIN — IPRATROPIUM BROMIDE AND ALBUTEROL SULFATE 1 AMPULE: .5; 2.5 SOLUTION RESPIRATORY (INHALATION) at 12:40

## 2022-07-31 RX ADMIN — IPRATROPIUM BROMIDE AND ALBUTEROL SULFATE 1 AMPULE: .5; 2.5 SOLUTION RESPIRATORY (INHALATION) at 16:15

## 2022-07-31 RX ADMIN — Medication 5 MG: at 20:59

## 2022-07-31 RX ADMIN — ASPIRIN 81 MG CHEWABLE TABLET 81 MG: 81 TABLET CHEWABLE at 09:05

## 2022-07-31 RX ADMIN — LORAZEPAM 1 MG: 1 TABLET ORAL at 21:00

## 2022-07-31 RX ADMIN — ROSUVASTATIN 20 MG: 20 TABLET, FILM COATED ORAL at 21:00

## 2022-07-31 ASSESSMENT — PAIN SCALES - GENERAL
PAINLEVEL_OUTOF10: 0
PAINLEVEL_OUTOF10: 0

## 2022-07-31 NOTE — PROGRESS NOTES
Occupational Therapy  OT BEDSIDE TREATMENT NOTE   [facility] [place] [address] [facility]  [address] [place] [address], [address] [place] [address]  Patient Name: Mike Palmer  MRN: [id number]  : 1955  Room: [address] [place] [address]     Per OT Eval:    Evaluating [address], OTR/L #[id number]     Referring Provider: Shannon Vargas MD  Specific Provider Orders/Date: OT eval and treat 22     Diagnosis: Hyperkalemia [E87.5]  Acute ischemic stroke [facility]) [I63.9]  Acute kidney injury ([facility] [N17.9]  Acute respiratory failure with hypoxia and hypercapnia ([facility] [J96.01, J96.02]  Cerebrovascular accident (CVA), unspecified mechanism ([facility] [I63.9]   Pt admitted to hospital on 22 for global L side weakness and facial droop. Respiratory  Per chart, recently diagnosed w/ breast CA ~10 days prior to admission        Pertinent Medical History:  has a past medical history of Anxiety, Arthritis, Cancer ([facility] Chronic bronchitis ([facility] Emphysema, and Walking pneumonia.          Precautions:  Fall Risk, cognition, L hemiparesis, O2, bed alarm  Recent diagnosis of breast CA w/ mets     Assessment of current deficits   [x] Functional mobility         [x]ADLs           [x] Strength                  [x]Cognition   [x] Functional transfers       [x] IADLs         [x] Safety Awareness   [x]Endurance   [x] Fine Coordination                      [x] Balance      [] Vision/perception   []Sensation      []Gross Motor Coordination          [x] ROM           [] Delirium                   [] Motor Control     OT PLAN OF CARE   OT POC based on physician orders, patient diagnosis and results of clinical assessment     Frequency/Duration 2-3 days/wk for 2 weeks PRN  Specific OT Treatment Interventions to include:   * Instruction/training on adapted ADL techniques and AE recommendations to increase functional independence within precautions       * Training on energy conservation strategies, correct breathing pattern and techniques to improve independence/tolerance for self-care routine  * Functional transfer/mobility training/DME recommendations for increased independence, safety, and fall prevention  * Patient/Family education to increase follow through with safety techniques and functional independence  * Recommendation of environmental modifications for increased safety with functional transfers/mobility and ADLs  * Cognitive retraining/development of therapeutic activities to improve problem solving, judgement, memory, and attention for increased safety/participation in ADL/IADL tasks  * Therapeutic exercise to improve motor endurance, ROM, and functional strength for ADLs/functional transfers  * Therapeutic activities to facilitate/challenge dynamic balance, stand tolerance for increased safety and independence with ADLs  * Therapeutic activities to facilitate gross/fine motor skills for increased independence with ADLs  * Positioning to improve skin integrity, interaction with environment and functional independence        OTMRS   Modified [place] Scale (MRS)  Score     Description  0             No symptoms  1             No significant disability despite symptoms  2             Slight disability; able to look after own affairs  3             Moderate disability; able to ambulate without assist/ requires assist with ADLs  4             Moderate/Severe disability;requires assist to ambulate/assist with ADLs  5             Severe disability;bedridden/incontinent  6               Score:   4     Recommended Adaptive Equipment: TBD     Home Living: Pt lives with grandson (6 y/o) however pt verbalized will be staying at sister's 2 floor home (1st floor setup). Pt is a very questionable historian. Prior Level of Function:  unable to obtain accurate PLOF from pt d/t cognition.  Family/caregiver not present     Pain Level: Pt did not complain of pain this session     Cognition: A&O: 2/4; Follows 1 step directions  Confusion noted during functional tasks - cues provided to sequence, attend to and initiate tasks. Easily distracted. Poor insight  L facial droop           Memory:  fair -           Sequencing:  fair -           Problem solving:  poor           Judgement/safety:  poor             Functional Assessment:  AM-PAC Daily Activity Raw Score: 14/24    Initial Eval Status  Date: 7/29/22 Treatment Status  Date: 7/31/22 STGs = LTGs  Time frame: 10-14 days   Feeding Minimal Assist  SBA  Pt requiring assistance to open tea package and sugar packets, pt able to grasp cup and bring to  mouth with use of RUE Supervision    Grooming Moderate Assist   Washed hands while seated EOB  Yosvany  Pt washed face, applied deodorant, combed hair with assistance to reach back of head and place hair into ponytail seated upright in chair Supervision    UB Dressing Moderate Assist  [address] gown as robe seated Stand by Assist    LB Dressing Dependent  modA  Pt able to mazin/doff hospital socks using cross over technique. Pt able to thread of brief and pants, assistance to stand and pull over hips Moderate Assist    Bathing Maximal Assist modA  Simulated Task    Pt able to wash of UB, assistance to wash of RUE. Pt able to bend at hips to wash of LB. Assistance to stand and wash of buttocks/jerod area  Minimal Assist    Toileting Maximal Assist  DNT  maxA per previous session Minimal Assist    Bed Mobility Supine to sit: Minimal Assist   Sit to supine: Minimal Assist   SBA  Supine<>EOB    HOB slightly elevated Supine to sit: Supervision   Sit to supine: Supervision    Functional Transfers Moderate Assist  modA  Sit to Stand  Stand to Sit    Stand Pivot Transfer EOB<>Chair Stand by Assist    Functional Mobility NT  Secondary to safety concerns  mod/maxA  Pt took of short steps during SPT, unsteady gait/balance Stand by Assist    Balance Sitting:    Static:  Mod A (posterolateral lean to R)    Dynamic:Mod A  Standing:  Mod A w/ HHA Sitting EOB:  SBA    Standing:  modA     Activity Tolerance Fair- Fair-  Fair+   Visual/  Perceptual Glasses: yes  L inattention noted during functional tasks. Unable to formally assess d/t cognition                          Hand Dominance R    AROM (PROM) Strength Additional Info:   RUE WFL 4-/5 good  and wfl FMC/dexterity noted during ADL tasks         LUE Distal, proximal AROM: limited  PROM: WFL    Shoulder flexion: 2-/5  Elbow flexion: 2/5  Hand: 2/5 Poor  and poor FMC/dexterity noted during ADL tasks  Impaired proprioception L UE      Hearing: WFL  Sensation:  c/o numbness L hand  Tone: WFL  Edema: none noted        Education:  Pt was educated on role of OT, goals to be reached, importance of OOB activity, safety and hand placement with transfers, safety/balance with functional mobility, and andrea dressing techniques to assist with UB/LB dressing tasks    Comments: Upon arrival pt supine in bed, agreeable to therapy, speaking with nursing okaying pt to be seen this session. Pt completed of bed mobility, functional mobility, transfers and ADL tasks this session. At end of session, pt seated upright in chair, all lines and tubes intact, call light within reach, nursing aware. Pt has made fair progress towards set goals.    Continue with current plan of care focusing on increasing of independency with transfers and ADL tasks      Treatment Time In: 10:45am           Treatment Time Out: 11:10am               Treatment Charges: Mins Units   Ther Ex  [id number]     Manual Therapy 86143     Thera Activities [id number] 8 1   ADL/Home Mgt [id number] 17 1   Neuro Re-ed [id number]     Group Therapy      Orthotic manage/training  [id number]     Non-Billable Time     Total Timed Treatment 25 2        Danuta KAHNA/L [id number]

## 2022-07-31 NOTE — PROGRESS NOTES
Call out to Dr Giovany Boss, via [facility] answering service, at patient request regarding possible start of protonix today instead of tomorrow at breakfast.

## 2022-07-31 NOTE — PROGRESS NOTES
Subjective:   Complains of abdominal bloating sensation. Denies any cp, palp, sob, n/v/c/d. She is sitting up and more alert than my last encounter.        Objective:    /64   Pulse 78   Temp 97.1 °F (36.2 °C) (Temporal)   Resp 19   Ht 5' 7\" (1.702 m)   Wt 115 lb 1.6 oz (52.2 kg)   SpO2 96%   BMI 18.03 kg/m²     General: NAD, thin  HEENT: left facial droop, mucosa dry without ulcerations,thrush or mucositis, EOMI, sclera anicteric, conjuntiva pink  NECK: supple  Heart:  RRR  Lungs:  respirations easy and nonlabored on O2 via NC  Extrem:  trace, decreased in size  Skin: pale, Intact, no petechia or purpura  Neuro: left sided weakness in arm/leg     CBC with Differential:    Lab Results   Component Value Date/Time    WBC 11.1 07/31/2022 05:53 AM    RBC 4.50 07/31/2022 05:53 AM    HGB 13.4 07/31/2022 05:53 AM    HCT 43.9 07/31/2022 05:53 AM    PLT 80 07/31/2022 05:53 AM    MCV 97.6 07/31/2022 05:53 AM    MCH 29.8 07/31/2022 05:53 AM    MCHC 30.5 07/31/2022 05:53 AM    RDW 15.6 07/31/2022 05:53 AM    NRBC 0.9 07/12/2022 05:54 PM    LYMPHOPCT 22.1 07/31/2022 05:53 AM    MONOPCT 11.0 07/31/2022 05:53 AM    BASOPCT 0.1 07/31/2022 05:53 AM    MONOSABS 1.22 07/31/2022 05:53 AM    LYMPHSABS 2.46 07/31/2022 05:53 AM    EOSABS 0.10 07/31/2022 05:53 AM    BASOSABS 0.01 07/31/2022 05:53 AM     CMP:    Lab Results   Component Value Date/Time     07/31/2022 05:53 AM    K 4.6 07/31/2022 05:53 AM    K 6.4 07/25/2022 02:41 PM    CL 99 07/31/2022 05:53 AM    CO2 30 07/31/2022 05:53 AM    BUN 32 07/31/2022 05:53 AM    CREATININE 0.7 07/31/2022 05:53 AM    GFRAA >60 07/31/2022 05:53 AM    LABGLOM >60 07/31/2022 05:53 AM    GLUCOSE 89 07/31/2022 05:53 AM    PROT 5.5 07/28/2022 06:05 AM    LABALBU 3.1 07/28/2022 06:05 AM    CALCIUM 7.9 07/31/2022 05:53 AM    BILITOT 1.2 07/28/2022 06:05 AM    ALKPHOS 77 07/28/2022 06:05 AM    AST 43 07/28/2022 06:05 AM     07/28/2022 06:05 AM          Current Facility-Administered Medications:     aspirin chewable tablet 81 mg, 81 mg, Oral, Daily, ANGELITA Luo NP, 81 mg at 07/31/22 0905    apixaban (ELIQUIS) tablet 5 mg, 5 mg, Oral, BID, ALLA Parmar, 5 mg at 07/31/22 0905    LORazepam (ATIVAN) tablet 1 mg, 1 mg, Oral, Q6H PRN, Colette Cruz MD, 1 mg at 07/30/22 2329    cefTRIAXone (ROCEPHIN) 1,000 mg in sterile water 10 mL IV syringe, 1,000 mg, IntraVENous, Q24H, Colette Cruz MD, 1,000 mg at 07/30/22 1411    budesonide (PULMICORT) nebulizer suspension 500 mcg, 500 mcg, Nebulization, BID, ANGELITA Hernandez CNP, 500 mcg at 07/31/22 0815    Arformoterol Tartrate (BROVANA) nebulizer solution 15 mcg, 15 mcg, Nebulization, BID, ANGELITA Hernandez CNP, 15 mcg at 07/31/22 0815    melatonin disintegrating tablet 5 mg, 5 mg, Oral, Nightly, Colette Cruz MD, 5 mg at 07/30/22 2133    glucose chewable tablet 16 g, 4 tablet, Oral, PRN, Colette Cruz MD    dextrose bolus 10% 125 mL, 125 mL, IntraVENous, PRN **OR** dextrose bolus 10% 250 mL, 250 mL, IntraVENous, PRN, Colette Cruz MD    glucagon (rDNA) injection 1 mg, 1 mg, SubCUTAneous, PRN, Colette Cruz MD    dextrose 10 % infusion, , IntraVENous, Continuous PRN, Colette Cruz MD    glucose chewable tablet 16 g, 4 tablet, Oral, PRN, Britany Hernandez MD    dextrose bolus 10% 125 mL, 125 mL, IntraVENous, PRN **OR** dextrose bolus 10% 250 mL, 250 mL, IntraVENous, PRN, Britany Hernandez MD    glucagon (rDNA) injection 1 mg, 1 mg, SubCUTAneous, PRN, Britany Hernandez MD    dextrose 10 % infusion, , IntraVENous, Continuous PRN, Britany Hernandez MD    [COMPLETED] insulin regular (HUMULIN R;NOVOLIN R) injection 5 Units, 5 Units, IntraVENous, Once, 5 Units at 07/25/22 1708 **AND** [COMPLETED] dextrose 50 % IV solution, 25 g, IntraVENous, Once, 25 g at 07/25/22 1708 **AND** POCT Glucose, , , Q30 Min **AND** POCT Glucose, , , 1 Time **AND** dextrose 50 % IV solution, 25 g, IntraVENous, PRN, Roxanna Sham, DO    ipratropium-albuterol (DUONEB) nebulizer solution 1 ampule, 1 ampule, Inhalation, Q4H WA, Laurie Pepe MD, 1 ampule at 07/31/22 0815    rosuvastatin (CRESTOR) tablet 20 mg, 20 mg, Oral, Nightly, Laurie Pepe MD, 20 mg at 07/30/22 2133    MRI BRAIN W WO CONTRAST   Final Result   1. Acute or early subacute infarctions in the right frontal lobe and the left   occipital lobe. 2. No hemorrhage, significant mass effect or midline shift. RECOMMENDATIONS:   Unavailable         XR CHEST PORTABLE   Final Result   Likely scarring or chronic atelectasis at the left lower lobe. Cardiomegaly. Obstructive airways disease. US DUP UPPER EXTREMITY RIGHT VENOUS   Final Result   Within the visualized vessels there is no evidence for deep venous   thrombosis               XR ABDOMEN (KUB) (SINGLE AP VIEW)   Final Result   No active process. Very small amount of residual contrast in the distal   small bowel and right colon. US DUP LOWER EXTREMITY RIGHT ARTERIES   Final Result   No evidence of peripheral arterial disease with normal waveforms and   velocities throughout the right lower extremity arterial system. RECOMMENDATIONS:   Unavailable         US DUP LOWER EXTREMITY RIGHT YAMILET   Final Result   No evidence of DVT in the right lower extremity. CT ABDOMEN PELVIS WO CONTRAST Additional Contrast? None   Final Result   1. The overall study is slightly suboptimal without the administration of IV   and oral contrast.      2.  Suspicion for left lower lobe pneumonia. 3.  Cardiomegaly again noted, when compared to the prior CT scan of the   abdomen and pelvis performed 07/15/2022 and CTA of the chest performed 1 day   previously. 4.  Partially seen right breast mass again noted. 5.  Vicarious excretion of IV contrast from the patient's CTA performed 07/25   into the gallbladder.       6.  Relatively bright enhancement of the renal parenchyma bilaterally,   indicating delayed excretion of injected contrast from the CTA performed 1   day prior. IV contrast is noted within the ureters and urinary bladder. The   findings indicate mild renal failure. 7.  Small to moderate amount of abdominal and pelvic ascites. CTA PULMONARY W CONTRAST   Final Result   1. Redemonstration of right breast mass with right axillary lymphadenopathy. 2. No evidence of pulmonary embolism. 3. Redemonstration of multiple enlarged mediastinal lymph nodes notable in   lower paratracheal location. 4. Moderate cardiomegaly with findings suggesting right heart failure. 5. Stable atelectatic changes in left lung base. 6. Peribronchial thickening bilaterally suggesting inflammation with sites of   bronchial stenosis notable in posterior segments of the lower lobes. CT Head WO Contrast   Final Result      CTA NECK W CONTRAST   Final Result   CT PERFUSION OF THE HEAD:      1. Area of ischemia is seen in the right parietal lobe, corresponding to the   site of edema identified on the CT of the head. CTA OF THE HEAD:      1. No major arterial stenosis is seen in the brain. CTA OF THE NECK:      1. Approximately 70% stenosis in the proximal right ICA. 2. Less than 50% stenosis in the proximal left ICA. 3. Approximately 50% stenosis of the origin of the left vertebral artery. 4. No significant stenosis in the right vertebral artery. RECOMMENDATIONS:   Unavailable         CTA HEAD W CONTRAST   Final Result   CT PERFUSION OF THE HEAD:      1. Area of ischemia is seen in the right parietal lobe, corresponding to the   site of edema identified on the CT of the head. CTA OF THE HEAD:      1. No major arterial stenosis is seen in the brain. CTA OF THE NECK:      1. Approximately 70% stenosis in the proximal right ICA. 2. Less than 50% stenosis in the proximal left ICA.    3. Approximately 50% stenosis of the origin of the left vertebral artery. 4. No significant stenosis in the right vertebral artery. RECOMMENDATIONS:   Unavailable         CT BRAIN PERFUSION   Final Result   CT PERFUSION OF THE HEAD:      1. Area of ischemia is seen in the right parietal lobe, corresponding to the   site of edema identified on the CT of the head. CTA OF THE HEAD:      1. No major arterial stenosis is seen in the brain. CTA OF THE NECK:      1. Approximately 70% stenosis in the proximal right ICA. 2. Less than 50% stenosis in the proximal left ICA. 3. Approximately 50% stenosis of the origin of the left vertebral artery. 4. No significant stenosis in the right vertebral artery. RECOMMENDATIONS:   Unavailable           79 yo female with recent breast biopsy positive for invasive poorly differentiated carcinoma with squamous differentiation, ER/WI/HER2 negative, HARSHAD-3 positive suggestive of breat primary, morphology described as spindle cell and keratinizing squamous differentiation. Right axillary node with similar report. CT chest significant for mediastinal adenopathy. Additional diagnoses CHF (BNP 69959), acute hypercapnic respiratory failure, LFTs elevated, urine with moderate bacteria, possible pneumonia, DIANN and agitation. Found to have R frontal and L occipital infarcts concerning for embolic source per neurology recommendations, eliquis started. 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 She has metastatic disease.    -If has further improvement in PS then can discuss possible treatment as outpatient.    -Can continue with anticoagulation as long as platelets > 72E and no signs of bleeding. Thrombocytopenia is currently mild. -PPI   -Follow CBC diff plt daily   -We will continue to follow supportively.     Electronically signed by Obdulio Gonzalez MD on 7/31/2022 at 11:38 AM

## 2022-07-31 NOTE — PROGRESS NOTES
Physical Therapy  Physical Therapy  rx     Name: Abi Su  : 1955  MRN: [id number]      Date of Service: 2022    Evaluating PT:  Domenico Ibanez, PT, DPT [id number]    Room #:  9392/8686-F  Diagnosis:  Hyperkalemia [E87.5]  Acute ischemic stroke [facility]) [I63.9]  Acute kidney injury ([facility] [N17.9]  Acute respiratory failure with hypoxia and hypercapnia ([facility] [J96.01, J96.02]  Cerebrovascular accident (CVA), unspecified mechanism ([facility] [I63.9]  PMHx/PSHx:    Past Medical History:   Diagnosis Date    Anxiety     Arthritis     Cancer ([facility]     Chronic bronchitis ([facility]     Emphysema     according to xray report 2013    Walking pneumonia      Precautions:  Fall risk, Alarms, Cognition, L hemiparesis, Mets  Equipment Needs:  TBD    SUBJECTIVE:  *Subjective information limited secondary to confusion  Pt lives with her [de-identified] year old grandson but states that she will be discharging to her sisters home where she will have first floor living accommodations. OBJECTIVE:   Initial Evaluation  Date: 22 Treatment    Short Term/ Long Term   Goals   AM-PAC 6 Clicks  13    Was pt agreeable to Eval/treatment? Yes Yes     Does pt have pain? No no    Bed Mobility  Rolling: NT  Supine to sit: Min A  Sit to supine: SBA  Scooting: Mod A Rolling  nt  Supine to sit sba  Sit to supine sba  Scooting min Rolling: Independent  Supine to sit: Independent  Sit to supine: Independent  Scooting: Independent   Transfers Sit to stand:  Mod A  Stand to sit: Mod A  Stand pivot: NT Sit to stand min bed to ww  Stand to sit  min  Stand pivot no device mod assist Sit to stand: Supervision  Stand to sit: Supervision  Stand pivot: Supervision   Ambulation    NT  Pt not able to  walker with L LUE    20 feet no device mod/max    50 feet with AAD supervision   Stair negotiation: ascended and descended  NT NT 2 steps with bilateral rail SBA   ROM BUE:  Refer to OT  BLE:  WFL     Strength BUE:  Refer to OT  BLE:    4+/5   Balance

## 2022-07-31 NOTE — PROGRESS NOTES
Hospitalist Progress Note      SYNOPSIS: Patient admitted on 7/25/2022     56-year-old female patient from home with medical history of severe pulmonary hypertension, tobacco use, right breast cancer, COPD, MDD, hypertension who was brought to the ED with concern for stroke. Patient last known normal well was around midnight last night. Family found her today around noon with left-sided weakness and facial droop. Upon EMS arrival at the scene patient had saturation of 56% on room air and placed on nonrebreather mask. Patient was recently diagnosed with breast cancer 10 days ago for which she was discharged from this facility. No report of fever, cough, CP, abdominal pain, n/v.   Stroke alert was activated on arrival to ED. CT scanning of the head showed subacute infarction described as area of ischemia in the right parietal and left occipital lobes. No hemorrhagic, significant mild static, or midline shift. At CTA of the head and neck obtained with no major intracranial arterial stenosis but 70% stenosis of the right proximal ICA. Telestroke was consulted and patient was out of the window for tPA given completed infarct, also per neuro recommendation patient with likely hypercoagulable state this ICA daily managed medically, with DAPT. Patient was given aspirin on the ED  Remaining ED work-up showing normal white blood cells, hemoglobin, elevated hematocrit, potassium 6.4, bicarb 32, BUN 73, creatinine 2.1, ABG showing pH 7.23, PCO2 76.7, PO2 202, troponin 66, and proBNP 17,428. EKG sinus bradycardia 59, normal QT, no ST segment changes. CTA pulmonary with contrast with no evidence of PE.   Redemonstration of right breast mass with right axillary lymphadenopathy and multiple enlarged mediastinal lymph nodes  Patient was admitted for further management of subacute stroke, acute hypercapnic and hypoxic respiratory failure     SUBJECTIVE:    Patient seen and examined at bedside, feeling better today, compliant with BiPAP overnight, in room air in no distress, denies shortness of breath, chest pain versus abdominal discomfort, also she is trying to void urine. Discussed with her if she is not able to void we will try the Valencia catheter again     records reviewed. Stable overnight. No other overnight issues reported. Temp (24hrs), Av.3 °F (36.3 °C), Min:96.7 °F (35.9 °C), Max:97.9 °F (36.6 °C)    DIET: ADULT DIET; Regular; Low Sodium (2 gm)  CODE: Prior    Intake/Output Summary (Last 24 hours) at 2022 0811  Last data filed at 2022 1826  Gross per 24 hour   Intake 400 ml   Output 1100 ml   Net -700 ml       OBJECTIVE:    /75   Pulse 82   Temp 97.9 °F (36.6 °C) (Temporal)   Resp 23   Ht 5' 7\" (1.702 m)   Wt 115 lb 1.6 oz (52.2 kg)   SpO2 94%   BMI 18.03 kg/m²     General appearance: No apparent distress, appears stated age and cooperative. HEENT: Normal cephalic, atraumatic without obvious deformity. Pupils equal, round, and reactive to light. Extra ocular muscles intact. Conjunctivae/corneas clear. Neck: Supple, with full range of motion. No jugular venous distention. Trachea midline. Respiratory: Creased respiratory effort, on BiPAP, expiratory wheezing, no crackles  Cardiovascular: Bradycardic, no murmur gallops or rubs  Abdomen: Nondistended, normal BS, nontender, no visceromegaly  Musculoskeletal: No clubbing, cyanosis, right foot erythema and edema. Brisk capillary refill. Skin: Normal skin color. No rashes or lesions. Neurologic:  Neurovascularly intact without any focal sensory/motor deficits. Cranial nerves: II-XII intact, grossly non-focal.  Left-sided hemiparesis and left-sided lower facial droop  Psychiatric: Alert and oriented, thought content appropriate, normal insight       ASSESSMENT:    Acute ischemic stroke. MRI : Acute or early subacute infarctions in the right frontal lobe and the left occipital lobe. Patient is already on aspirin and Plavix. for UTI. Urine culture sent. No growth however sent after she was already on antibiotics. Will complete 5 days of treatment  -Monitor urine output. If Urinary Retention Place, Valencia back and  -Proper equipment for home has been ordered. However family has changed their mind and patient now to go to subacute rehab       DISPOSITION: For subacute rehab. Needs pre-CERT    Medications:  REVIEWED DAILY    Infusion Medications    dextrose      dextrose       Scheduled Medications    aspirin  81 mg Oral Daily    apixaban  5 mg Oral BID    cefTRIAXone (ROCEPHIN) IV  1,000 mg IntraVENous Q24H    budesonide  500 mcg Nebulization BID    Arformoterol Tartrate  15 mcg Nebulization BID    melatonin  5 mg Oral Nightly    ipratropium-albuterol  1 ampule Inhalation Q4H WA    rosuvastatin  20 mg Oral Nightly     PRN Meds: LORazepam, glucose, dextrose bolus **OR** dextrose bolus, glucagon (rDNA), dextrose, glucose, dextrose bolus **OR** dextrose bolus, glucagon (rDNA), dextrose, [COMPLETED] insulin regular **AND** [COMPLETED] dextrose **AND** POCT Glucose **AND** POCT Glucose **AND** dextrose    Labs:     Recent Labs     07/29/22  0538 07/30/22  0543 07/31/22  0553   WBC 11.9* 12.5* 11.1   HGB 14.9 16.4* 13.4   HCT 47.6 52.5* 43.9   PLT 91* 84* 80*       Recent Labs     07/29/22  0538 07/30/22  0543 07/31/22  0553    139 134   K 4.1 4.1 4.6   CL 94* 95* 99   CO2 37* 30* 30*   BUN 27* 23 32*   CREATININE 0.8 0.7 0.7   CALCIUM 8.4* 8.6 7.9*       No results for input(s): PROT, ALB, ALKPHOS, ALT, AST, BILITOT, AMYLASE, LIPASE in the last 72 hours. No results for input(s): INR in the last 72 hours. No results for input(s): Rhae Childes in the last 72 hours.       Chronic labs:    Lab Results   Component Value Date    CHOL 170 07/26/2022    TRIG 121 07/26/2022    HDL 36 07/26/2022    LDLCALC 110 (H) 07/26/2022    TSH 2.510 07/14/2022    INR 2.0 07/25/2022    LABA1C 5.8 (H) 07/26/2022       Radiology: REVIEWED DAILY    +++++++++++++++++++++++++++++++++++++++++++++++++  Louisa Goodman MD  [facility] Physician - [address] [place] [address]  +++++++++++++++++++++++++++++++++++++++++++++++++  NOTE: This report was transcribed using voice recognition software. Every effort was made to ensure accuracy; however, inadvertent computerized transcription errors may be present.

## 2022-07-31 NOTE — PROGRESS NOTES
Pt has been agreeable to wearing Bipap at this time. Bipap was applied without issue and patient is tolerating well.

## 2022-08-01 ENCOUNTER — HOSPITAL ENCOUNTER (OUTPATIENT)
Dept: OTHER | Age: 67
Setting detail: THERAPIES SERIES
Discharge: HOME | End: 2022-08-01

## 2022-08-01 ENCOUNTER — APPOINTMENT (OUTPATIENT)
Dept: GENERAL RADIOLOGY | Age: 67
DRG: 064 | End: 2022-08-01
Payer: MEDICARE

## 2022-08-01 PROBLEM — E43 SEVERE PROTEIN-CALORIE MALNUTRITION (HCC): Chronic | Status: ACTIVE | Noted: 2022-08-01

## 2022-08-01 LAB
ALBUMIN SERPL-MCNC: 2.5 G/DL (ref 3.5–5.2)
ALP BLD-CCNC: 54 U/L (ref 35–104)
ALT SERPL-CCNC: 61 U/L (ref 0–32)
AST SERPL-CCNC: 30 U/L (ref 0–31)
BASOPHILS ABSOLUTE: 0 E9/L (ref 0–0.2)
BASOPHILS ABSOLUTE: 0 E9/L (ref 0–0.2)
BASOPHILS RELATIVE PERCENT: 0 % (ref 0–2)
BASOPHILS RELATIVE PERCENT: 0 % (ref 0–2)
BILIRUB SERPL-MCNC: 0.7 MG/DL (ref 0–1.2)
BILIRUBIN DIRECT: 0.4 MG/DL (ref 0–0.3)
BILIRUBIN, INDIRECT: 0.3 MG/DL (ref 0–1)
EOSINOPHILS ABSOLUTE: 0.12 E9/L (ref 0.05–0.5)
EOSINOPHILS ABSOLUTE: 0.15 E9/L (ref 0.05–0.5)
EOSINOPHILS RELATIVE PERCENT: 1.6 % (ref 0–6)
EOSINOPHILS RELATIVE PERCENT: 1.8 % (ref 0–6)
HCT VFR BLD CALC: 30.2 % (ref 34–48)
HCT VFR BLD CALC: 30.5 % (ref 34–48)
HEMOGLOBIN: 9.5 G/DL (ref 11.5–15.5)
HEMOGLOBIN: 9.6 G/DL (ref 11.5–15.5)
IMMATURE GRANULOCYTES #: 0.02 E9/L
IMMATURE GRANULOCYTES #: 0.02 E9/L
IMMATURE GRANULOCYTES %: 0.2 % (ref 0–5)
IMMATURE GRANULOCYTES %: 0.3 % (ref 0–5)
LYMPHOCYTES ABSOLUTE: 1.21 E9/L (ref 1.5–4)
LYMPHOCYTES ABSOLUTE: 1.65 E9/L (ref 1.5–4)
LYMPHOCYTES RELATIVE PERCENT: 15.8 % (ref 20–42)
LYMPHOCYTES RELATIVE PERCENT: 19.9 % (ref 20–42)
MCH RBC QN AUTO: 29.8 PG (ref 26–35)
MCH RBC QN AUTO: 30 PG (ref 26–35)
MCHC RBC AUTO-ENTMCNC: 31.5 % (ref 32–34.5)
MCHC RBC AUTO-ENTMCNC: 31.5 % (ref 32–34.5)
MCV RBC AUTO: 94.7 FL (ref 80–99.9)
MCV RBC AUTO: 95.3 FL (ref 80–99.9)
METER GLUCOSE: 94 MG/DL (ref 74–99)
MONOCYTES ABSOLUTE: 0.93 E9/L (ref 0.1–0.95)
MONOCYTES ABSOLUTE: 0.98 E9/L (ref 0.1–0.95)
MONOCYTES RELATIVE PERCENT: 11.8 % (ref 2–12)
MONOCYTES RELATIVE PERCENT: 12.2 % (ref 2–12)
NEUTROPHILS ABSOLUTE: 5.37 E9/L (ref 1.8–7.3)
NEUTROPHILS ABSOLUTE: 5.48 E9/L (ref 1.8–7.3)
NEUTROPHILS RELATIVE PERCENT: 66.3 % (ref 43–80)
NEUTROPHILS RELATIVE PERCENT: 70.1 % (ref 43–80)
PDW BLD-RTO: 14.7 FL (ref 11.5–15)
PDW BLD-RTO: 14.8 FL (ref 11.5–15)
PLATELET # BLD: 93 E9/L (ref 130–450)
PLATELET # BLD: 95 E9/L (ref 130–450)
PLATELET CONFIRMATION: NORMAL
PLATELET CONFIRMATION: NORMAL
PMV BLD AUTO: 9.9 FL (ref 7–12)
PMV BLD AUTO: 9.9 FL (ref 7–12)
RBC # BLD: 3.19 E12/L (ref 3.5–5.5)
RBC # BLD: 3.2 E12/L (ref 3.5–5.5)
TOTAL PROTEIN: 4.3 G/DL (ref 6.4–8.3)
WBC # BLD: 7.7 E9/L (ref 4.5–11.5)
WBC # BLD: 8.3 E9/L (ref 4.5–11.5)

## 2022-08-01 PROCEDURE — 6370000000 HC RX 637 (ALT 250 FOR IP): Performed by: PHYSICIAN ASSISTANT

## 2022-08-01 PROCEDURE — 2700000000 HC OXYGEN THERAPY PER DAY

## 2022-08-01 PROCEDURE — 2580000003 HC RX 258: Performed by: STUDENT IN AN ORGANIZED HEALTH CARE EDUCATION/TRAINING PROGRAM

## 2022-08-01 PROCEDURE — 6370000000 HC RX 637 (ALT 250 FOR IP): Performed by: STUDENT IN AN ORGANIZED HEALTH CARE EDUCATION/TRAINING PROGRAM

## 2022-08-01 PROCEDURE — 6370000000 HC RX 637 (ALT 250 FOR IP): Performed by: INTERNAL MEDICINE

## 2022-08-01 PROCEDURE — 97129 THER IVNTJ 1ST 15 MIN: CPT

## 2022-08-01 PROCEDURE — 36415 COLL VENOUS BLD VENIPUNCTURE: CPT

## 2022-08-01 PROCEDURE — 2060000000 HC ICU INTERMEDIATE R&B

## 2022-08-01 PROCEDURE — 6360000002 HC RX W HCPCS: Performed by: STUDENT IN AN ORGANIZED HEALTH CARE EDUCATION/TRAINING PROGRAM

## 2022-08-01 PROCEDURE — 80076 HEPATIC FUNCTION PANEL: CPT

## 2022-08-01 PROCEDURE — 82962 GLUCOSE BLOOD TEST: CPT

## 2022-08-01 PROCEDURE — 94640 AIRWAY INHALATION TREATMENT: CPT

## 2022-08-01 PROCEDURE — 85025 COMPLETE CBC W/AUTO DIFF WBC: CPT

## 2022-08-01 PROCEDURE — 6360000002 HC RX W HCPCS

## 2022-08-01 PROCEDURE — 97130 THER IVNTJ EA ADDL 15 MIN: CPT

## 2022-08-01 PROCEDURE — 74018 RADEX ABDOMEN 1 VIEW: CPT

## 2022-08-01 PROCEDURE — 6370000000 HC RX 637 (ALT 250 FOR IP): Performed by: NURSE PRACTITIONER

## 2022-08-01 PROCEDURE — 94660 CPAP INITIATION&MGMT: CPT

## 2022-08-01 RX ORDER — MAGNESIUM HYDROXIDE/ALUMINUM HYDROXICE/SIMETHICONE 120; 1200; 1200 MG/30ML; MG/30ML; MG/30ML
30 SUSPENSION ORAL EVERY 6 HOURS PRN
Status: DISCONTINUED | OUTPATIENT
Start: 2022-08-01 | End: 2022-08-04 | Stop reason: HOSPADM

## 2022-08-01 RX ORDER — POLYETHYLENE GLYCOL 3350 17 G/17G
17 POWDER, FOR SOLUTION ORAL DAILY PRN
Status: DISCONTINUED | OUTPATIENT
Start: 2022-08-01 | End: 2022-08-04 | Stop reason: HOSPADM

## 2022-08-01 RX ORDER — PANTOPRAZOLE SODIUM 40 MG/1
40 TABLET, DELAYED RELEASE ORAL
Status: DISCONTINUED | OUTPATIENT
Start: 2022-08-01 | End: 2022-08-01 | Stop reason: SDUPTHER

## 2022-08-01 RX ORDER — BISACODYL 10 MG
10 SUPPOSITORY, RECTAL RECTAL PRN
Status: DISCONTINUED | OUTPATIENT
Start: 2022-08-01 | End: 2022-08-04 | Stop reason: HOSPADM

## 2022-08-01 RX ORDER — POLYETHYLENE GLYCOL 3350 17 G/17G
17 POWDER, FOR SOLUTION ORAL DAILY
Status: DISCONTINUED | OUTPATIENT
Start: 2022-08-01 | End: 2022-08-01

## 2022-08-01 RX ADMIN — BUDESONIDE 500 MCG: 0.5 SUSPENSION RESPIRATORY (INHALATION) at 08:53

## 2022-08-01 RX ADMIN — WATER 1000 MG: 1 INJECTION INTRAMUSCULAR; INTRAVENOUS; SUBCUTANEOUS at 14:32

## 2022-08-01 RX ADMIN — IPRATROPIUM BROMIDE AND ALBUTEROL SULFATE 1 AMPULE: .5; 2.5 SOLUTION RESPIRATORY (INHALATION) at 08:53

## 2022-08-01 RX ADMIN — IPRATROPIUM BROMIDE AND ALBUTEROL SULFATE 1 AMPULE: .5; 2.5 SOLUTION RESPIRATORY (INHALATION) at 12:38

## 2022-08-01 RX ADMIN — IPRATROPIUM BROMIDE AND ALBUTEROL SULFATE 1 AMPULE: .5; 2.5 SOLUTION RESPIRATORY (INHALATION) at 20:57

## 2022-08-01 RX ADMIN — LORAZEPAM 1 MG: 1 TABLET ORAL at 17:42

## 2022-08-01 RX ADMIN — ARFORMOTEROL TARTRATE 15 MCG: 15 SOLUTION RESPIRATORY (INHALATION) at 20:57

## 2022-08-01 RX ADMIN — ROSUVASTATIN 20 MG: 20 TABLET, FILM COATED ORAL at 20:54

## 2022-08-01 RX ADMIN — ARFORMOTEROL TARTRATE 15 MCG: 15 SOLUTION RESPIRATORY (INHALATION) at 08:53

## 2022-08-01 RX ADMIN — BUDESONIDE 500 MCG: 0.5 SUSPENSION RESPIRATORY (INHALATION) at 20:57

## 2022-08-01 RX ADMIN — APIXABAN 5 MG: 5 TABLET, FILM COATED ORAL at 20:55

## 2022-08-01 RX ADMIN — BISMUTH SUBSALICYLATE 30 ML: 525 LIQUID ORAL at 17:42

## 2022-08-01 RX ADMIN — APIXABAN 5 MG: 5 TABLET, FILM COATED ORAL at 09:07

## 2022-08-01 RX ADMIN — Medication 5 MG: at 20:54

## 2022-08-01 RX ADMIN — IPRATROPIUM BROMIDE AND ALBUTEROL SULFATE 1 AMPULE: .5; 2.5 SOLUTION RESPIRATORY (INHALATION) at 16:19

## 2022-08-01 RX ADMIN — PANTOPRAZOLE SODIUM 40 MG: 40 TABLET, DELAYED RELEASE ORAL at 06:37

## 2022-08-01 RX ADMIN — ASPIRIN 81 MG CHEWABLE TABLET 81 MG: 81 TABLET CHEWABLE at 09:07

## 2022-08-01 NOTE — PROGRESS NOTES
Acute Rehab Pre-Admission Screen      Referral date: 7/29/2022  Onset/Hospital Admit Date: 7/25/2022  1:54 PM    Current Location: [facility]8/8508-B    Name: Maura Brody  YOB: 1955  Age: 78 y.o. Admitting Diagnosis: Acute Ischemic Stroke   Address: [address] , [facility] [address]  Home Phone: [phone] (home)  Sabrina Ross #:     Sex: female  Race:A. White  Ethnicity: A. No, not of ,  or [place]an origin    Marital Status:    Ethnic/Cultural/Hoahaoism Considerations:   Non-Restoration    Advanced Directives: [x] Full Code  [] [address] [] Medications only       [] Living Will  [] DPOA      []Organ donor      [] No mechanical breathing or ventilation     [] no tube feeding, nutrition or hydration      [] Patient does not have advanced directives or living will     Copies in Chart: yes    COVERAGE INFORMATION   Primary Insurer: [facility] medicare  Payor Contact: ***  Phone: ***  FAX:***  Authorization #: [id number]  Secondary Insurer: Samantha Sauceda  care [place] [id number]  Medicare #: [id number]  Medicaid #: [id number]  Verified coverage: [] Patient  [] Family/caregiver    [x] financial department [] insurance carrier    COVID SCREEN DATE:  Result: (negative, positive)      MEDICAL UPDATE:  History of present admission: Kendall Reis is a 78 y.o. female who was brought to the ED with concern for a stroke. She has a PMH of severe pulmonary hypertension, tobacco use, right breast cancer, COPD, MDD, and hypertension. She has left sided weakness and left sided facial droop. When EMS was called her saturation at the scene was 56%. She was placed on a non-re-breather mask. PHYSICIAN / REFERRAL INFORMATION  Referring Physician: Dr. Violet Elizabeth MD  Attending Physician: Violet Elizabeth MD  Primary Care Physician: No primary care provider on file.   Consultants/Opinions (see full consult notes on chart): Neurology (CVA), Pulmonology (COPD Exacerbation), Hematology (Breast CA), Palliative Care (Code Status)    SOCIAL INFORMATION  Primary  Contact: Jael Salmeron   Relationship: child  Primary Phone: [phone]    Secondary  Contact: Jody Singh  Relationship: child  Primary Phone: [phone]     Previous Community Services: None listed  Caregiver available: [x] Yes [] No Hours per day available: 24/7  Patient previously employed:  [] Yes [] Part Time [] Full Time [x] No [] Retired  Occupation/Profession: none listed  Prior living arrangements: [x] Home  [] Assisted living  [] SNF [] Other  Lived with:  [] Alone  [] Spouse  [x] Family  [] Other Discharging to her sisters home with 1st floor living arrangements  Lived with: 6 y.o. Grandson    Home:  1 story home  not listed entry steps  Rails: not listed     Bedroom: [x] 1st floor    Bathroom:  [x] 1st floor      Prior Functional Level: Independent for: Unable to obtain accurate PLOF from pt d/t cognition. Family/ care giver not present. Assistance for:   Dependent for:   Dominant hand: [x] Right  [] Left    Previous Home Equipment:  [] Cane [] Grab bars [] Orthotic / prosthetic   [] Shower chair [] Tub bench  [] 3-in-1 Commode [] Long handle sponge   [] Oxygen [] Sock aide  [] Wheelchair  [] motorized wc/scooter  [] Wheelchair cushion   [] Crutches [] Long handle shoehorn  [] Reachers [] Toilet seat elevator [] Rollator  [] Walker(wheeled)   [] Walker(standard) [] Mechanical lift    [x] None of the above     Has patient had 2 or more falls in the past year or any fall with injury in the past year? [] yes   [] no   [x]unknown    Has patient had major surgery during past 100 days prior to admission?    [] yes   [x] no Type/ Date:     Surgical History:  Past Surgical History:   Procedure Laterality Date    APPENDECTOMY      INDER US PERQ DEVICE SOFT TISSUE PLMT  FIRST LESION  7/15/2022    INDER US PERQ DEVICE SOFT TISSUE PLMT  FIRST LESION 7/15/2022 [facility]    US BREAST NEEDLE BIOPSY RIGHT Right 7/15/2022    US BREAST NEEDLE BIOPSY RIGHT 7/15/2022 [facility]       Past Medical:  Past Medical History:   Diagnosis Date    Anxiety     Arthritis     Cancer ([facility] [address]     Chronic bronchitis ([facility] [address]     Emphysema     according to xray report 2013    Walking pneumonia        Current Co-morbidities:  [] Alzheimer's   [] Dysphasia    [] Parkinsonism  [] Amputation   [] GERD   [] Peripheral artery disease   [] Anemia      [] Encephalopathy  [] Peripheral vascular disease  [x] Anxiety   [] Gangrene   [x] Pneumonia H/O  [] Aphasia   [] Gout   [] Polyneuropathy  [] Asthma   [] Heart Failure (diastolic) [] Post-polio syndrome  [] Atrial fibrillation  [] Heart Failure (left-sided) [] Pseudomonas enteritis   [] Blind   [] Heart Failure (right-sided) [] Pulmonary embolism  [] Cellulitis     [] Heart Failure (systolic) [] Renal dialysis  [] Clostridium difficile  [x] Hemiparesis Left  [] Renal failure  [] Congestive heart failure [x] Hypertension  [] Rheumatoid arthritis  [x] COPD/Emphysema   [] Hypotension  [] Seizure disorder   [] Coronary Artery Disease [] Hypothyroidism  [] Septicemia   [] Deaf   [] Hyperlipidemia  [] Sleep apnea  [x] Depression   [] Morbid obesity  [] Spinal cord injury  [] Diabetes   [] MRSA   [x] Stroke  [] Diabetic nephropathy [] Myocardial infarction [] Tracheostomy  [] Diabetic neuropathy [x] Osteoarthritis  [] Traumatic brain injury   [] Diabetic retinopathy [] Osteoporosis  [] Urinary tract infection  [] DVT   [] Pancytopenia  [] Vocal cord paralysis  []  Spinal stenosis  [x] Acute kidney Injury  [x] Chronic Bronchitis  [] Post op    [x] Breast CA-newly dx    [x] Acute Resp.  Failure with hypoxia       Medical/Functional Conditions requiring inpatient rehabilitation: Patient has functional deficits in ADLS, mobility, speech, swallow and cognition, impaired balance, and needs ongoing medical management   Requires multidisciplinary treatment including PM&R physician daily care, 24 hour rehabilitation nursing, physical therapy, occupational therapy, rehabilitation psychology, recreation therapy and rehabilitation social work, nutrition services due to new deficits     Risk for Medical/Clinical Complications: Falls, injury, pain, skin breakdown, abnormal vitals, abnormal labs, DVT, PE, pneumonia, decreased mobility, neuro changes    CLINICAL DATA:     Height : 5'7\"     Weight:  115lbs   BMI: 18.03       Date: 8/2/2022 Date: 8/3/2022 Date: ***   temperature 98.5 97.7    pulse 63 64    respirations 18 16    Blood pressure 106/59 108/53    Pulse oximeter 97%  on 2 L N/C 100% on 2 L N/C       ALLERGIES: Bee venom    DIET : ADULT DIET; Regular; Low Sodium (2 gm)    Current Lab and Diagnostic Tests:   Recent Results (from the past 24 hour(s))   CBC with Auto Differential    Collection Time: 08/01/22  4:47 AM   Result Value Ref Range    WBC 8.3 4.5 - 11.5 E9/L    RBC 3.20 (L) 3.50 - 5.50 E12/L    Hemoglobin 9.6 (L) 11.5 - 15.5 g/dL    Hematocrit 30.5 (L) 34.0 - 48.0 %    MCV 95.3 80.0 - 99.9 fL    MCH 30.0 26.0 - 35.0 pg    MCHC 31.5 (L) 32.0 - 34.5 %    RDW 14.7 11.5 - 15.0 fL    Platelets 93 (L) 909 - 450 E9/L    MPV 9.9 7.0 - 12.0 fL    Neutrophils % 66.3 43.0 - 80.0 %    Immature Granulocytes % 0.2 0.0 - 5.0 %    Lymphocytes % 19.9 (L) 20.0 - 42.0 %    Monocytes % 11.8 2.0 - 12.0 %    Eosinophils % 1.8 0.0 - 6.0 %    Basophils % 0.0 0.0 - 2.0 %    Neutrophils Absolute 5.48 1.80 - 7.30 E9/L    Immature Granulocytes # 0.02 E9/L    Lymphocytes Absolute 1.65 1.50 - 4.00 E9/L    Monocytes Absolute 0.98 (H) 0.10 - 0.95 E9/L    Eosinophils Absolute 0.15 0.05 - 0.50 E9/L    Basophils Absolute 0.00 0.00 - 0.20 E9/L   Platelet Confirmation    Collection Time: 08/01/22  4:47 AM   Result Value Ref Range    Platelet Confirmation CONFIRMED      CT ABDOMEN PELVIS WO CONTRAST Additional Contrast? None    Result Date: 7/26/2022      1.   The overall study is slightly suboptimal without the administration of IV and oral contrast. 2.  Suspicion for left lower lobe pneumonia. 3.  Cardiomegaly again noted, when compared to the prior CT scan of the abdomen and pelvis performed 07/15/2022 and CTA of the chest performed 1 day previously. 4.  Partially seen right breast mass again noted. 5.  Vicarious excretion of IV contrast from the patient's CTA performed 07/25 into the gallbladder. 6.  Relatively bright enhancement of the renal parenchyma bilaterally, indicating delayed excretion of injected contrast from the CTA performed 1 day prior. IV contrast is noted within the ureters and urinary bladder. The findings indicate mild renal failure. 7.  Small to moderate amount of abdominal and pelvic ascites. CT Head WO Contrast    Result Date: 7/25/2022  ULL:  No acute abnormality of the visualized skull or soft tissues. 1. LIKELY SUBACUTE INFARCTIONS in the right parietal and left occipital lobes. 2. No hemorrhage, significant mass effect or midline shift. CTA NECK W CONTRAST    Result Date: 7/25/2022  EXAMINATION: CTA OF THE HEAD WITH CONTRAST WITH PERFUSION; CTA OF THE NECK; CTA OF THE HEAD WITH CONTRAST    CT PERFUSION OF THE HEAD: 1. Area of ischemia is seen in the right parietal lobe, corresponding to the site of edema identified on the CT of the head. CTA OF THE HEAD: 1. No major arterial stenosis is seen in the brain. CTA OF THE NECK: 1. Approximately 70% stenosis in the proximal right ICA. 2. Less than 50% stenosis in the proximal left ICA. 3. Approximately 50% stenosis of the origin of the left vertebral artery. 4. No significant stenosis in the right vertebral artery. RECOMMENDATIONS: Unavailable     CTA PULMONARY W CONTRAST    Result Date: 7/25/2022  EXAMINATION: CTA OF THE CHEST     1. Redemonstration of right breast mass with right axillary lymphadenopathy. 2. No evidence of pulmonary embolism. 3. Redemonstration of multiple enlarged mediastinal lymph nodes notable in lower paratracheal location.  4. Moderate cardiomegaly with findings suggesting right heart failure. 5. Stable atelectatic changes in left lung base. 6. Peribronchial thickening bilaterally suggesting inflammation with sites of bronchial stenosis notable in posterior segments of the lower lobes. US DUP LOWER EXTREMITY RIGHT ARTERIES    Result Date: 7/26/2022      No evidence of peripheral arterial disease with normal waveforms and velocities throughout the right lower extremity arterial system. RECOMMENDATIONS: Unavailable     US DUP LOWER EXTREMITY RIGHT YAMILET    Result Date: 7/26/2022  EXAMINATION: DUPLEX VENOUS ULTRASOUND OF THE RIGHT LOWER EXTREMITY    No evidence of DVT in the right lower extremity. CT BRAIN PERFUSION    Result Date: 7/25/2022  EXAMINATION: CTA OF THE HEAD WITH CONTRAST WITH PERFUSION; CTA OF THE NECK; CTA OF THE HEAD WITH CONTRAST RECOMMENDATIONS: Unavailable     CTA HEAD W CONTRAST    Result Date: 7/25/2022  EXAMINATION: CTA OF THE HEAD WITH CONTRAST WITH PERFUSION; CTA OF THE NECK; CTA OF THE HEAD WITH CONTRAST     CT PERFUSION OF THE HEAD: 1. Area of ischemia is seen in the right parietal lobe, corresponding to the site of edema identified on the CT of the head. CTA OF THE HEAD: 1. No major arterial stenosis is seen in the brain. CTA OF THE NECK: 1. Approximately 70% stenosis in the proximal right ICA. 2. Less than 50% stenosis in the proximal left ICA. 3. Approximately 50% stenosis of the origin of the left vertebral artery. 4. No significant stenosis in the right vertebral artery.  RECOMMENDATIONS: Unavailable       Additional labs or diagnostic studies needed before admission to rehabilitation unit:      Medications:   polyethylene glycol  17 g Oral Daily    pantoprazole  40 mg Oral QAM AC    cefTRIAXone (ROCEPHIN) IV  1,000 mg IntraVENous Q24H    aspirin  81 mg Oral Daily    apixaban  5 mg Oral BID    budesonide  500 mcg Nebulization BID    Arformoterol Tartrate  15 mcg Nebulization BID    melatonin  5 mg Oral Nightly    ipratropium-albuterol  1 ampule Inhalation Q4H WA    rosuvastatin  20 mg Oral Nightly      dextrose      dextrose       LORazepam, glucose, dextrose bolus **OR** dextrose bolus, glucagon (rDNA), dextrose, glucose, dextrose bolus **OR** dextrose bolus, glucagon (rDNA), dextrose, [COMPLETED] insulin regular **AND** [COMPLETED] dextrose **AND** POCT Glucose **AND** POCT Glucose **AND** dextrose    SPECIAL PRECAUTIONS: [] No current precautions  [] Cardiac  [] Renal [] Sternal [] Respiratory      [x] Neurological           [] Hip  [] Spinal [] Seizure  [x] Aspiration  [] Isolation precautions:    [] Contact   [] Respiratory   [] Protective     [] Droplet    [] Weight Bearing precautions:         [] Non Weight Bearing :         [] Toe Touch Weight Bearing :        [] Partial Weight Bearing :         [] Weight Bearing as Tolerated :         [] Fall Risk:   [] Recent history of falls [x] Falls risk level (Munson Scale): High      [] Bed Alarm    [x] Do not leave alone in the bathroom    [] Chair Alarm    [] Cognitive impairment      [] [facility] supervision  [] [facility]   [] Safety enclosure bed  [] Decreased balance     SPECIAL REHABILITATION NEEDS:   [] IV Therapy: [x] PRN Adapter  [] Midline  [] PICC      [] Central Line    [] TPN       [x] Oxygen: [] Trach [x] Bi-PAP [] CPAP  [] Nasal cannula  [x] Liters: 2N/C     [] Wound Care:   [] Pressure ulcers(stage and location)    [] Wound vac   [] Wound or incision care    [x] Pain Management (level of pain, meds): Oxycodone prn, Tylenol prn.     [] Incontinence Bladder [] [place]  Insertion date:    []Hemodialysis and  Frequency:   [] Incontinence Bowel    [x] Last bowel movement : 8/2/2022    Substance use history: [] Yes  [] No   [x] Tobacco 1 PPD [] Alcohol  [] Other     [] Ethnic  [] Cultural  [] Spiritual  [] Language [] Needs  [] Other than English  [] Hearing Impaired  [] Visually Impaired  [] Speaking Impaired  [] Blind  [] Special equipment:  [] Devices/Splints  [] Type   [] Brace   [] Type  [] Bariatric bed  [] Extra wide commode  [] Extra wide wheelchair [] Extra wide walker  [] Anival walker  [] Anival wheelchair  [] Transfer lift    [] Other equipment     FUNCTIONAL STATUS PT / Alissa Canales / Reyne Schlatter:  FIM / WATSON Discipline Initial: 7/29/2022 Follow Up: 8/2/2022 Current: ***   Eating OT Minimal assist   NPO    Grooming OT Moderate assist   Minimal assist      Bathing OT Maximal assist   Moderate assist      Dressing Upper Extremity OT Moderate assist   Minimal assist      Dressing Lower Extremity OT dependent   Moderate assist      Toileting OT Maximal assist   Moderate assist      Toilet Transfers OT NT NT    Tub/Shower Transfers OT NT NT    Homemaking OT NT NT    Bed Mobility PT Minimal assist   Minimal assist      Bed/Wheelchair Transfers PT Moderate assist   Moderate assist      Locomotion Walk / Wheelchair  Device:  Distance: PT NT Moderate assist  [employer]  20 Feet    Endurance PT - -    Expression SP - -    Social Interaction SP - -    Problem Solving SP Poor -    Memory SP Min cues -    Comprehension SP - -    Swallowing SP - -    Bowel Management NSG      Bladder Management NSG Incontinent  Incontinent      Comments on Functional Status: Able to tolerate 3 hours of therapy per day split into four 45 minute sessions. -    [x] Able to participate a minimum of 3 hours per day of therapy intervention    Required treatments/services: [x] Rehabilitation nursing [] Dietitian / nurtition                 [x] Case management  [] Respiratory Therapy      [x] Social work   [] Other     Required Therapy:  Therapy Hours per Day Days per Week Therapeutic Interventions Required   [x] Physical Therapy 1 5-7 Gait, transfers, Safety, strength, education, endurance   [x] Occupational Therapy 1 5-7 ADLs, IADLs, Safety, strength, education, endurance   [x] Speech Pathology 1 5-7 Speech, swallow, Cognition, and safety   [] Prosthetics / Orthotics       []         Anticipated Discharge Plan:   Anticipated DME Needs:  [x] Home     [] Commode   [] Alone    [] Wheelchair   [x] Supervised    [] Walker   [x] Assist    [] Oxygen        [] Hospital Bed  [] Assisted Living    [] Ramp        [x] To Be Determined    Anticipated Home Health Services:  Anticipated Outpatient Services:  [] PT       [] PT  [] OT      [] OT  [] Speech     [] Speech  [] Nursing     [] Dialysis  [] Aide      [x] To Be Determined  [x] To Be Determined    Anticipated support group:  [] Amputation  [] Multiple Sclerosis  [x] Stroke  [] Brain Injury  [] Spinal cord injury  [] Other     Barriers to discharge: Impaired self care and impaired mobility. Discharge Support: [] Patient lives alone and does not have a caregiver available     [x] Patient has a caregiver available     [x] Discharge plan has been verified with patient's caregiver      [x] Caregiver is in agreement with the discharge plan     Expected functional status for safe discharge: Self care with adaptations/assistance    Patient/support person goals: To go home    Expected length of stay: 1-2 weeks    Discussed expected length of stay and agreeable to IRF plan: [x] Yes   [] No    Impairment Group Category: 01.1    Etiological Diagnosis:   Acute or early subacute infarctions in the right frontal lobe and the left   occipital lobe. Primary Rehabilitation Diagnosis: CVA    Electronically signed by Justyn Lemons RN on 8/1/2022 at 1:50 PM    Prescreen completed __________________________________ (signature of prescreener)    Date:   *** Time:  ***    JUSTIFICATION FOR ADMISSION TO ACUTE REHABILITATION:  Patient has suffered decline in functional abilities for gait, transfers, speech, swallowing, cognition,  ADL's and IADL's as well as endurance. Patient has functional deficits requiring intensive therapy across multiple disciplines in order to return home safely.  Patient will need physician oversight for respiratory issues, abnormal vital signs, nutritional and hydration status, safety issues, medications and therapy modalities. PT, OT and speech will work on deficits as noted in evaluations. Case management and social work will provide services for DME and management of a safe discharge home.          RECOMMEND LEVEL OF CARE  Recommend inpatient rehabilitation: [x] Yes   [] No  If no indicate reason:  [] Functional level too high  [] Unmotivated  [] No insurance carrier approval [] Unlikely to return to community  [] No medical necessity  [] Patient or family chose other facility  [] Too medically complex  [] Inadequate discharge plan  [] Rehabilitation bed unavailable [] Functional level too low  [] patient or family refused ARU    If patient not accepted for IRF admission, recommended level of care:  [] [address]  [] [address]  [] [place]   [] Home Care  [] Other      [] LTAC       Physician Assigned:  [] Dr. Christine Murillo         [x] Dr. Michael Gauthier              [] Dr. Shravan Douglas [] Dr. Chidi Ly  [] Dr. Rebekah Somers:    ____________________________________________________________________  ____________________________________________________________________  ____________________________________________________________________  ____________________________________________________________________  ____________________________________________________________________      Physician Signature:_____________________________________    Print Signature:_________________________________________    Date:   *** Time:    ***

## 2022-08-01 NOTE — PROGRESS NOTES
Hospitalist Progress Note      SYNOPSIS: Patient admitted on 7/25/2022     43-year-old female patient from home with medical history of severe pulmonary hypertension, tobacco use, right breast cancer, COPD, MDD, hypertension who was brought to the ED with concern for stroke. Patient last known normal well was around midnight last night. Family found her today around noon with left-sided weakness and facial droop. Upon EMS arrival at the scene patient had saturation of 56% on room air and placed on nonrebreather mask. Patient was recently diagnosed with breast cancer 10 days ago for which she was discharged from this facility. No report of fever, cough, CP, abdominal pain, n/v.   Stroke alert was activated on arrival to ED. CT scanning of the head showed subacute infarction described as area of ischemia in the right parietal and left occipital lobes. No hemorrhagic, significant mild static, or midline shift. At CTA of the head and neck obtained with no major intracranial arterial stenosis but 70% stenosis of the right proximal ICA. Telestroke was consulted and patient was out of the window for tPA given completed infarct, also per neuro recommendation patient with likely hypercoagulable state this ICA daily managed medically, with DAPT. Patient was given aspirin on the ED  Remaining ED work-up showing normal white blood cells, hemoglobin, elevated hematocrit, potassium 6.4, bicarb 32, BUN 73, creatinine 2.1, ABG showing pH 7.23, PCO2 76.7, PO2 202, troponin 66, and proBNP 17,428. EKG sinus bradycardia 59, normal QT, no ST segment changes. CTA pulmonary with contrast with no evidence of PE.   Redemonstration of right breast mass with right axillary lymphadenopathy and multiple enlarged mediastinal lymph nodes  Patient was admitted for further management of subacute stroke, acute hypercapnic and hypoxic respiratory failure     SUBJECTIVE:    Patient seen and examined at bedside, stated her abdominal pain has improved. Shortness of breath, chest pain  Voiding appropriately     records reviewed. Stable overnight. No other overnight issues reported. Temp (24hrs), Av.3 °F (36.3 °C), Min:97 °F (36.1 °C), Max:97.6 °F (36.4 °C)    DIET: ADULT DIET; Regular; Low Sodium (2 gm)  CODE: Prior  No intake or output data in the 24 hours ending 22 1107      OBJECTIVE:    BP (!) 100/58   Pulse 70   Temp 97.3 °F (36.3 °C) (Temporal)   Resp 19   Ht 5' 7\" (1.702 m)   Wt 115 lb 1.6 oz (52.2 kg)   SpO2 98%   BMI 18.03 kg/m²     General appearance: No apparent distress, appears stated age and cooperative. HEENT: Normal cephalic, atraumatic without obvious deformity. Pupils equal, round, and reactive to light. Extra ocular muscles intact. Conjunctivae/corneas clear. Neck: Supple, with full range of motion. No jugular venous distention. Trachea midline. Respiratory: Creased respiratory effort, on BiPAP, expiratory wheezing, no crackles  Cardiovascular: Bradycardic, no murmur gallops or rubs  Abdomen: Nondistended, normal BS, nontender, no visceromegaly  Musculoskeletal: No clubbing, cyanosis, right foot erythema and edema. Brisk capillary refill. Skin: Normal skin color. No rashes or lesions. Neurologic:  Neurovascularly intact without any focal sensory/motor deficits. Cranial nerves: II-XII intact, grossly non-focal.  Left-sided hemiparesis and left-sided lower facial droop  Psychiatric: Alert and oriented, thought content appropriate, normal insight       ASSESSMENT:    Acute ischemic stroke. MRI : Acute or early subacute infarctions in the right frontal lobe and the left occipital lobe. Patient is already on aspirin and Plavix. Neurology consulted from admission and recommendations appreciated. As patient has multiple location of anterior and posterior stroke embolic source is suspected.   Neurology recommending AUDREY if oncology does not feel that this is a result of a hypercoagulable state, otherwise neurology recommended just to start oral anticoagulants and no further work-up indicated. Patient to be started on Eliquis. Therefore Plavix will be discontinued    Acute hypoxic and hypercapnic respiratory failure. With a history of COPD. Mild exacerbation on admission. Received [place] of the steroids and DuoNeb's. Resolved  Hyper kalemia in the course of DIANN and dehydration. Resolved  Acute respiratory acidosis  DIANN  Elevated troponin in the setting of acute respiratory failure and a stroke. -unlikely ACS, likely demand ischemia  Recent diagnosis of right breast cancer. Status post ultrasound-guided core biopsy of the right axillary lymphadenopathy. 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 Oncology following. Additional tissue diagnosis  Severe pulmonary hypertension  History of tobacco use  History of MDD/and anxiety  Hypertension  Right foot edema. Right lower extremity venous Doppler negative for DVT. A right lower extremity arterial Doppler normal  Sinus bradycardia ( Hypotension and bradycardia. Cosyntropin stimulation test showed normal cortisol but obtained throughout steroids on ED  Deconditioning, impaired gait. Patient needs wheelchair for safety transportation. A cane or a walker are not safe tools at this moment. Increased risk for falls       PLAN:    -Breathing treatments due neb scheduled. Completed short course steroids  -BiPAP QHS and daytime as needed   -Continue aspirin. Plavix discontinued as patient was started on Eliquis for hypercoagulable state  -Oncology following  -ceftriaxone for UTI. Urine culture sent. No growth however sent after she was already on antibiotics. Will complete 5 days of treatment  -Monitor urine output. If Urinary Retention Place, [place] back and  -Proper equipment for home has been ordered.   However family has changed their mind and patient now to go to subacute rehab. Outpatient has been evaluated and deemed appropriate For acute rehab       DISPOSITION: Acute rehab    Medications:  REVIEWED DAILY    Infusion Medications    dextrose      dextrose       Scheduled Medications    pantoprazole  40 mg Oral QAM AC    cefTRIAXone (ROCEPHIN) IV  1,000 mg IntraVENous Q24H    aspirin  81 mg Oral Daily    apixaban  5 mg Oral BID    budesonide  500 mcg Nebulization BID    Arformoterol Tartrate  15 mcg Nebulization BID    melatonin  5 mg Oral Nightly    ipratropium-albuterol  1 ampule Inhalation Q4H WA    rosuvastatin  20 mg Oral Nightly     PRN Meds: LORazepam, glucose, dextrose bolus **OR** dextrose bolus, glucagon (rDNA), dextrose, glucose, dextrose bolus **OR** dextrose bolus, glucagon (rDNA), dextrose, [COMPLETED] insulin regular **AND** [COMPLETED] dextrose **AND** POCT Glucose **AND** POCT Glucose **AND** dextrose    Labs:     Recent Labs     07/30/22  0543 07/31/22  0553 08/01/22  0447   WBC 12.5* 11.1 8.3   HGB 16.4* 13.4 9.6*   HCT 52.5* 43.9 30.5*   PLT 84* 80* 93*       Recent Labs     07/30/22  0543 07/31/22  0553    134   K 4.1 4.6   CL 95* 99   CO2 30* 30*   BUN 23 32*   CREATININE 0.7 0.7   CALCIUM 8.6 7.9*       No results for input(s): PROT, ALB, ALKPHOS, ALT, AST, BILITOT, AMYLASE, LIPASE in the last 72 hours. No results for input(s): INR in the last 72 hours. No results for input(s): Jani Pears in the last 72 hours.       Chronic labs:    Lab Results   Component Value Date    CHOL 170 07/26/2022    TRIG 121 07/26/2022    HDL 36 07/26/2022    LDLCALC 110 (H) 07/26/2022    TSH 2.510 07/14/2022    INR 2.0 07/25/2022    LABA1C 5.8 (H) 07/26/2022       Radiology: REVIEWED DAILY    +++++++++++++++++++++++++++++++++++++++++++++++++  Shashi Jay MD  [facility] Physician - [address] [place], [place]  +++++++++++++++++++++++++++++++++++++++++++++++++  NOTE: This report was transcribed using voice recognition software. Every effort was made to ensure accuracy; however, inadvertent computerized transcription errors may be present.

## 2022-08-01 NOTE — PROGRESS NOTES
Louisa Monroy M.D.,[facility]  Estella Toledo D.O., F.A.C.O.I., Rola Steinberg M.D. Kassy Chacon M.D. Dahiana Solis D.O. Daily Pulmonary Progress Note    Patient:  Maura Brody 78 y.o. female MRN: [id number]     Date of Service: 8/1/2022      Synopsis     We are following patient for respiratory failure with hypoxia and hypercapnia, copd exacerbation    \"CC\" recent stroke    Code status: FULL       Subjective   Patient seen and examined today on room air to determine home oxygen needs. She de-saturated to 87% on room air. She is not ambulatory therefore unable to determine saturations with ambulation. She will need 2L oxygen for home. She will also need a non-invasive ventilator for home. She retains CO2 very easily and will need a higher mode of ventilation. See note below. She has a nebulizer at home but will need medication. Will order. Primary care to order a nicotine patch for smoking cessation. Lung sounds are CTA today. She is still a little disoriented. 8/1/22 feeling ok today. Still with daytime hypersomnolence. oxygen at 2 liters NC. Dc to SNF. Review of Systems:  Not able to obtain due to mentation    24-hour events:  MRI completed    Objective   Vitals: BP (!) 100/58   Pulse 70   Temp 97.3 °F (36.3 °C) (Temporal)   Resp 19   Ht 5' 7\" (1.702 m)   Wt 115 lb 1.6 oz (52.2 kg)   SpO2 98%   BMI 18.03 kg/m²     I/O:  No intake or output data in the 24 hours ending 08/01/22 1253       CPAP/EPAP: 5 cmH2O     CURRENT MEDS :  Scheduled Meds:   pantoprazole  40 mg Oral QAM AC    cefTRIAXone (ROCEPHIN) IV  1,000 mg IntraVENous Q24H    aspirin  81 mg Oral Daily    apixaban  5 mg Oral BID    budesonide  500 mcg Nebulization BID    Arformoterol Tartrate  15 mcg Nebulization BID    melatonin  5 mg Oral Nightly    ipratropium-albuterol  1 ampule Inhalation Q4H WA    rosuvastatin  20 mg Oral Nightly       Physical Exam:  General Appearance: appears comfortable in no acute distress. Frail cachexia  HEENT: Normocephalic atraumatic without obvious abnormality   Neck: Lips, mucosa, and tongue normal.  Supple, symmetrical, trachea midline, no adenopathy;thyroid:  no enlargement/tenderness/nodules or JVD. Lung: Breath sounds diminished. Respirations   unlabored. Symmetrical expansion. Heart: RRR, normal S1, S2. No MRG  Abdomen: Soft, NT, ND. BS present x 4 quadrants. No bruit or organomegaly. Extremities: Pedal pulses 2+ symmetric b/l. Extremities normal, no cyanosis, clubbing, or edema. Musculokeletal: No joint swelling, no muscle tenderness. ROM normal in all joints of extremities. Neurologic: Mental status: hypersomnolence improved confusion. Still with some confusion    Pertinent/ New Labs and Imaging Studies     Imaging Personally Reviewed:      CTA pulmonary 7/25  1. Redemonstration of right breast mass with right axillary lymphadenopathy. 2. No evidence of pulmonary embolism. 3. Redemonstration of multiple enlarged mediastinal lymph nodes notable in   lower paratracheal location. 4. Moderate cardiomegaly with findings suggesting right heart failure. 5. Stable atelectatic changes in left lung base. 6. Peribronchial thickening bilaterally suggesting inflammation with sites of   bronchial stenosis notable in posterior segments of the lower lobes. CT brain perfusion 7/25  1. Area of ischemia is seen in the right parietal lobe, corresponding to the   site of edema identified on the CT of the head. CTA OF THE HEAD:       1. No major arterial stenosis is seen in the brain. CTA OF THE NECK:       1. Approximately 70% stenosis in the proximal right ICA. 2. Less than 50% stenosis in the proximal left ICA. 3. Approximately 50% stenosis of the origin of the left vertebral artery. 4. No significant stenosis in the right vertebral artery      CT head WO contrast 7/25  1. LIKELY SUBACUTE INFARCTIONS in the right parietal and left occipital lobes.    2. No hemorrhage, significant mass effect or midline shift. MRI 7/28  1. Acute or early subacute infarctions in the right frontal lobe and the left   occipital lobe. 2. No hemorrhage, significant mass effect or midline shift. Echo 7/13/22   Summary   Ejection fraction is visually estimated at 50%. Dilated right ventricle and reduced right ventricular function (TAPSE 1.0   cm). Indeterminate diastolic function. Severely dilated right atrium. Mild mitral regurgitation. Mild aortic regurgitation. Moderate tricuspid regurgitation. RV-RA gradient is estimated at 58 mmHg      Labs:  Lab Results   Component Value Date/Time    WBC 8.3 08/01/2022 04:47 AM    HGB 9.6 08/01/2022 04:47 AM    HCT 30.5 08/01/2022 04:47 AM    MCV 95.3 08/01/2022 04:47 AM    MCH 30.0 08/01/2022 04:47 AM    MCHC 31.5 08/01/2022 04:47 AM    RDW 14.7 08/01/2022 04:47 AM    PLT 93 08/01/2022 04:47 AM    MPV 9.9 08/01/2022 04:47 AM     Lab Results   Component Value Date/Time     07/31/2022 05:53 AM    K 4.6 07/31/2022 05:53 AM    K 6.4 07/25/2022 02:41 PM    CL 99 07/31/2022 05:53 AM    CO2 30 07/31/2022 05:53 AM    BUN 32 07/31/2022 05:53 AM    CREATININE 0.7 07/31/2022 05:53 AM    LABALBU 3.1 07/28/2022 06:05 AM    CALCIUM 7.9 07/31/2022 05:53 AM    GFRAA >60 07/31/2022 05:53 AM    LABGLOM >60 07/31/2022 05:53 AM     Lab Results   Component Value Date/Time    PROTIME 22.5 07/25/2022 02:05 PM    INR 2.0 07/25/2022 02:05 PM     No results for input(s): PROBNP in the last 72 hours. No results for input(s): PROCAL in the last 72 hours. This SmartLink has not been configured with any valid records. Micro:  No results for input(s): CULTRESP in the last 72 hours. No results for input(s): LABGRAM in the last 72 hours. No results for input(s): LEGUR in the last 72 hours. No results for input(s): STREPNEUMAGU in the last 72 hours. No results for input(s): LP1UAG in the last 72 hours.     Repeat abgs on AVAPS 400 + 5 EPAP Latest Reference Range & Units 7/26/22 17:59   pH, Blood Gas 7.350 - 7.450  7.327 (L)   PCO2 35.0 - 45.0 mmHg 72.3 (HH)   pO2 75.0 - 100.0 mmHg 92.3   HCO3 22.0 - 26.0 mmol/L 37.0 (H)      Latest Reference Range & Units 7/28/22 10:03   pH, Blood Gas 7.350 - 7.450  7.406   PCO2 35.0 - 45.0 mmHg 65.8 (H)   pO2 75.0 - 100.0 mmHg 108.4 (H)   HCO3 22.0 - 26.0 mmol/L 40.4 (H)   Base Excess -3.0 - 3.0 mmol/L 12.2 (H)   O2 Sat 92.0 - 98.5 % 97.8       Assessment:    Acute respiratory failure with hypoxia and hypercapnia  Stoke in right middle cerebral artery territory v. Tumor metastatic disease  New diagnosis of breast cancer with metastasis. DIANN resolved  Hyperkalemia resolved  Emphysema with chronic scarring  Atelectasis  Possible central ELANA, recent stroke  Pulmonary HTN, RVSP 58  Tobacco abuse       Plan:   Wean oxygen as tolerated to keep SpO2 >90%, on 2 L NC, will need oxygen at 2L continuous via nasal cannula for home  AVAPS 400 + 5 at HS and alternate during day for respiratory support- will need NIV for home when dc from rehab  Trend abg, with improvement  7/28/22  Duonebs Q4H WA,  bronchodilators: Pulmicort/Brovana BID, will order meds for home, (Trelegy, Albuterol nebs)  Chest CT  imaging reviewed, (-) PE, emphysema with chronic scarring noted, bibasilar atelectasis  Steroids completed  Rocephin completed for UTI per primary  Neurology for CVA- Plavix and ASA- did not receive tPa. Hematology/oncology on board  Smoking cessation education   Palliative medicine to assist with goals of care, code status discussion  [facility] Case management being set up through [facility] for [facility]    This patient has chronic respiratory failure due to end stage COPD. Her condition has worsened and her quality of life has deteriorated. A non invasive home ventilator is required at this time as the patient has a significant risk of dying from acute on top of chronic hypercapnic respiratory failure if she doesn't get to the hospital in time. Patient has used Bipap in the past but it has been ineffective in managing her hypercapnic respiratory failure. It is my professional opinion that the patient needs treated with an augmented tidal volume to resolve the CO2 retention that is not available with a Bipap or [address] This patient would benefit from non-invasive therapy, utilizing AVAPS AE to provide a targeted tidal volume. Her last PFT's show severe COPD. It is therefore required that due to the severity of her lung disease a non invasive home ventilator is medically necessary to aid in the management of her severe condition. Without this advanced therapy this patient is at high risk for a life threatening exacerbation of COPD requiring frequent hospital readmissions. This plan of care was reviewed in collaboration with Dr. Yady Sorto  Electronically signed by ANGELITA Tenorio CNP on 8/1/2022 at 12:53 PM       I personally saw, examined, and cared for the patient. Labs, medications, radiographs reviewed. I agree with history exam and plans detailed in NP note.         Jazlyn Mcgovern, DO

## 2022-08-01 NOTE — PROGRESS NOTES
Spoke to Dr. Jennie Prescott. Patient wants to go home with home therapy. Spoke to son, he would like her to come to [facility]. Plans are for her to go home to her daughters house upon discharge. He will be hear later today and will let us know if she is agreeable.    Nicky Valderrama RN  [facility] Pre-screener/case manger

## 2022-08-01 NOTE — PROGRESS NOTES
Subjective:   Chart reviewed, significant H/H decline  Seen at bedside, son and DIL present  Patient will be admitted to ARU  Continued of abdominal bloating sensation and discomfort  Denies any cp, palp, sob, n/v/c/d.     Objective:    BP (!) 100/58   Pulse 70   Temp 97.3 °F (36.3 °C) (Temporal)   Resp 19   Ht 5' 7\" (1.702 m)   Wt 115 lb 1.6 oz (52.2 kg)   SpO2 98%   BMI 18.03 kg/m²     General: NAD, thin  HEENT: left facial droop, mucosa dry without ulcerations,thrush or mucositis, EOMI, sclera anicteric, conjuntiva pink  NECK: supple  Heart:  RRR  Lungs:  respirations easy and nonlabored on O2 via NC  Extrem:  trace, decreased in size  Skin: pale, Intact, no petechia or purpura  Neuro: left sided weakness in arm/leg     CBC with Differential:    Lab Results   Component Value Date/Time    WBC 8.3 08/01/2022 04:47 AM    RBC 3.20 08/01/2022 04:47 AM    HGB 9.6 08/01/2022 04:47 AM    HCT 30.5 08/01/2022 04:47 AM    PLT 93 08/01/2022 04:47 AM    MCV 95.3 08/01/2022 04:47 AM    MCH 30.0 08/01/2022 04:47 AM    MCHC 31.5 08/01/2022 04:47 AM    RDW 14.7 08/01/2022 04:47 AM    NRBC 0.9 07/12/2022 05:54 PM    LYMPHOPCT 19.9 08/01/2022 04:47 AM    MONOPCT 11.8 08/01/2022 04:47 AM    BASOPCT 0.0 08/01/2022 04:47 AM    MONOSABS 0.98 08/01/2022 04:47 AM    LYMPHSABS 1.65 08/01/2022 04:47 AM    EOSABS 0.15 08/01/2022 04:47 AM    BASOSABS 0.00 08/01/2022 04:47 AM     CMP:    Lab Results   Component Value Date/Time     07/31/2022 05:53 AM    K 4.6 07/31/2022 05:53 AM    K 6.4 07/25/2022 02:41 PM    CL 99 07/31/2022 05:53 AM    CO2 30 07/31/2022 05:53 AM    BUN 32 07/31/2022 05:53 AM    CREATININE 0.7 07/31/2022 05:53 AM    GFRAA >60 07/31/2022 05:53 AM    LABGLOM >60 07/31/2022 05:53 AM    GLUCOSE 89 07/31/2022 05:53 AM    PROT 5.5 07/28/2022 06:05 AM    LABALBU 3.1 07/28/2022 06:05 AM    CALCIUM 7.9 07/31/2022 05:53 AM    BILITOT 1.2 07/28/2022 06:05 AM    ALKPHOS 77 07/28/2022 06:05 AM    AST 43 07/28/2022 06:05 AM  07/28/2022 06:05 AM          Current Facility-Administered Medications:     pantoprazole (PROTONIX) tablet 40 mg, 40 mg, Oral, [facility] AC, Tere Zuniga MD, 40 mg at 08/01/22 2438    cefTRIAXone (ROCEPHIN) 1,000 mg in sterile water 10 mL IV syringe, 1,000 mg, IntraVENous, Q24H, Michael Cartwright MD, 1,000 mg at 07/31/22 1429    aspirin chewable tablet 81 mg, 81 mg, Oral, Daily, ANGELITA Michaud NP, 81 mg at 08/01/22 3152    apixaban (ELIQUIS) tablet 5 mg, 5 mg, Oral, BID, ALLA Chakraborty, 5 mg at 08/01/22 6700    LORazepam (ATIVAN) tablet 1 mg, 1 mg, Oral, Q6H PRN, Michael Cartwright MD, 1 mg at 07/31/22 2100    budesonide (PULMICORT) nebulizer suspension 500 mcg, 500 mcg, Nebulization, BID, ANGELITA Steel - CNP, 500 mcg at 08/01/22 9509    Arformoterol Tartrate (BROVANA) nebulizer solution 15 mcg, 15 mcg, Nebulization, BID, ANGELITA Steel CNP, 15 mcg at 08/01/22 4276    melatonin disintegrating tablet 5 mg, 5 mg, Oral, Nightly, Michael Cartwright MD, 5 mg at 07/31/22 2059    glucose chewable tablet 16 g, 4 tablet, Oral, PRN, Michael Cartwright MD    dextrose bolus 10% 125 mL, 125 mL, IntraVENous, PRN **OR** dextrose bolus 10% 250 mL, 250 mL, IntraVENous, PRN, Michael Cartwright MD    glucagon (rDNA) injection 1 mg, 1 mg, SubCUTAneous, PRN, Michael Cartwright MD    dextrose 10 % infusion, , IntraVENous, Continuous PRN, Michael Cartwright MD    glucose chewable tablet 16 g, 4 tablet, Oral, PRN, Britany Hernandez MD    dextrose bolus 10% 125 mL, 125 mL, IntraVENous, PRN **OR** dextrose bolus 10% 250 mL, 250 mL, IntraVENous, PRN, Britany Hernandez MD    glucagon (rDNA) injection 1 mg, 1 mg, SubCUTAneous, PRN, Britany Hernandez MD    dextrose 10 % infusion, , IntraVENous, Continuous PRN, Britany Hernandez MD    [COMPLETED] insulin regular (HUMULIN R;NOVOLIN R) injection 5 Units, 5 Units, IntraVENous, Once, 5 Units at 07/25/22 1708 **AND** [COMPLETED] dextrose 50 % IV solution, 25 g, IntraVENous, Once, 25 g at 07/25/22 1708 **AND** POCT Glucose, , , Q30 Min **AND** POCT Glucose, , , 1 Time **AND** dextrose 50 % IV solution, 25 g, IntraVENous, PRN, Northville Prows, DO    ipratropium-albuterol (DUONEB) nebulizer solution 1 ampule, 1 ampule, Inhalation, Q4H WA, Claudeen Quarry, MD, 1 ampule at 08/01/22 0853    rosuvastatin (CRESTOR) tablet 20 mg, 20 mg, Oral, Nightly, Claudeen Quarry, MD, 20 mg at 07/31/22 2100    MRI C/ Darcy 29   Final Result   1. Acute or early subacute infarctions in the right frontal lobe and the left   occipital lobe. 2. No hemorrhage, significant mass effect or midline shift. RECOMMENDATIONS:   Unavailable         XR CHEST PORTABLE   Final Result   Likely scarring or chronic atelectasis at the left lower lobe. Cardiomegaly. Obstructive airways disease. US DUP UPPER EXTREMITY RIGHT VENOUS   Final Result   Within the visualized vessels there is no evidence for deep venous   thrombosis               XR ABDOMEN (KUB) (SINGLE AP VIEW)   Final Result   No active process. Very small amount of residual contrast in the distal   small bowel and right colon. US DUP LOWER EXTREMITY RIGHT ARTERIES   Final Result   No evidence of peripheral arterial disease with normal waveforms and   velocities throughout the right lower extremity arterial system. RECOMMENDATIONS:   Unavailable         US DUP LOWER EXTREMITY RIGHT YAMILET   Final Result   No evidence of DVT in the right lower extremity. CT ABDOMEN PELVIS WO CONTRAST Additional Contrast? None   Final Result   1. The overall study is slightly suboptimal without the administration of IV   and oral contrast.      2.  Suspicion for left lower lobe pneumonia. 3.  Cardiomegaly again noted, when compared to the prior CT scan of the   abdomen and pelvis performed 07/15/2022 and CTA of the chest performed 1 day   previously. 4.  Partially seen right breast mass again noted.       5. the proximal right ICA. 2. Less than 50% stenosis in the proximal left ICA. 3. Approximately 50% stenosis of the origin of the left vertebral artery. 4. No significant stenosis in the right vertebral artery. RECOMMENDATIONS:   Unavailable         CT BRAIN PERFUSION   Final Result   CT PERFUSION OF THE HEAD:      1. Area of ischemia is seen in the right parietal lobe, corresponding to the   site of edema identified on the CT of the head. CTA OF THE HEAD:      1. No major arterial stenosis is seen in the brain. CTA OF THE NECK:      1. Approximately 70% stenosis in the proximal right ICA. 2. Less than 50% stenosis in the proximal left ICA. 3. Approximately 50% stenosis of the origin of the left vertebral artery. 4. No significant stenosis in the right vertebral artery. RECOMMENDATIONS:   Unavailable           79 yo female with recent breast biopsy positive for invasive poorly differentiated carcinoma with squamous differentiation, ER/MI/HER2 negative, HARSHAD-3 positive suggestive of breat primary, morphology described as spindle cell and keratinizing squamous differentiation. Right axillary node with similar report. CT chest significant for mediastinal adenopathy. Additional diagnoses CHF (BNP 35844), acute hypercapnic respiratory failure, LFTs elevated, urine with moderate bacteria, possible pneumonia, DIANN and agitation. Found to have R frontal and L occipital infarcts concerning for embolic source per neurology recommendations, eliquis started. 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 She has metastatic disease.    -If has further improvement in PS then can discuss possible treatment as outpatient.    -Can continue with anticoagulation as long as platelets > 39B and no signs of bleeding. Thrombocytopenia is currently mild.     -PPI   -check stool for blood, repeat LFTs  -Follow CBC diff plt daily -suppository, abd xray today, pending results  -We will continue to follow supportively and she can follow-up 3-4 weeks after discharge to check on recovery/PS    Electronically signed by ALLA Mary on 8/1/2022 at 11:01 AM

## 2022-08-01 NOTE — CARE COORDINATION
Care Coordination: Following for discharge planning. PM and R consult was written on Friday but patient did not cooperate with eval.  RN states patient is better today, and suggested a re evaluation may be appropriate. SW called ARU and requested Dr. Jean Paul Barger to see patient again today. Also following for a discharge plan to home. Plan would be to [facility]  [phone] [facility]  [id number]. Home health with [facility]  . Dr. Ron Thomas to follow as no PCP at this time. Must have NIV prior to discharge home. Per Jessica this am, prior auth request  or NIV was sent on Friday. They still need the signed script faxed when complete. It is in soft chart. They also will set up 02  and bring portable to unit . Kely CORTEZ has order for  [facility],  hospital bed, wheelchair and can set up delivery as needed.

## 2022-08-01 NOTE — CONSULTS
[facility] [place]                  [address] [place],  [facility]                                  CONSULTATION    PATIENT NAME: Mk Zendejas                       :        1955  MED REC NO:   [id number]                            ROOM:       [facility]  ACCOUNT NO:   [de-identified]                           ADMIT DATE: 2022  PROVIDER:     Juana Patel MD    CONSULT DATE:  2022    REHAB CONSULT    CHIEF COMPLAINT:  CVA. HISTORY OF PRESENT ILLNESS:  The patient was admitted to [address] [place] [address]  on 2022 with an apparent CVA. She also has underlying lung  cancer. I saw her initially and she was confused and uncooperative, but  she has settled down much better and is doing better now and  participating better overall. I was asked to reevaluate her. PAST MEDICAL HISTORY:  1. Congestive heart failure. 2.  Breast cancer. 3.  COPD. 4.  CVA. ALLERGIES:  BEE VENOM. CURRENT MEDICATIONS:  Brovana, aspirin, Pulmicort, Rocephin, Plavix,  Lovenox, and Crestor. PHYSICAL EXAMINATION:  GENERAL:  A thin, frail white female in no acute distress at rest.  HEENT:  Head:  Normocephalic, atraumatic. Eyes:  Pupils equal, round,  reactive to light. Pharynx normal.  LUNGS:  Decreased breath sounds with scattered rales. HEART:  Regular rate and rhythm. S1, S2 normal.  No murmurs or gallops. ABDOMEN:  Bowel sounds normal.  Soft, nontender. No masses or  organomegaly. EXTREMITIES:  Without clubbing, cyanosis or edema. NEUROLOGIC:  Mental status, alert. She is oriented to person only,  partially to place and time. Sensation grossly intact. Neuromuscular,  3/5 at the left upper, 4- at the left lower. DIAGNOSES:  1. CVA with left hemiparesis. 2.  Lung cancer. RECOMMENDATIONS:  The patient still has significant functional deficits.   She is moderate assist for transfers, moderate to max assist to ambulate  about 20 feet, moderate assist for her ADLs. She is appropriate for  acute rehab. She tells me that she wants to go home and have outpatient  therapy. She would require 24-hour hands on assistance at home which  she is really minimizing at this point. I would recommend that she come  to acute rehab for a while if she is willing to do so, and we will  discuss further with family and see if they can address these issues  with her.         Jam San MD    D: 08/01/2022 11:22:27       T: 08/01/2022 11:25:54     [email or web address]  Job#: [id number]     Doc#: [id number]    CC:

## 2022-08-01 NOTE — PROGRESS NOTES
Comprehensive Nutrition Assessment    Type and Reason for Visit:  Initial, RD Nutrition Re-Screen/LOS    Nutrition Recommendations/Plan:   Continue Diet. Will Start ONS and monitor. Malnutrition Assessment:  Malnutrition Status:  Severe malnutrition (08/01/22 1418)    Context:  Chronic Illness     Findings of the 6 clinical characteristics of malnutrition:  Energy Intake:  75% or less estimated energy requirements for 1 month or longer  Weight Loss:  Unable to assess (2/2 CHF hx w/ possible fluid shifts pta)     Body Fat Loss:  Severe body fat loss Orbital, Triceps, Buccal region   Muscle Mass Loss:  Severe muscle mass loss Temples (temporalis), Clavicles (pectoralis & deltoids), Scapula (trapezius)  Fluid Accumulation:  Unable to assess     Strength:  Not Performed    Nutrition Assessment:    Pt adm w/ Lt Hemiparesis x ~1d pta. PMHx CHF, COPD, MDD, Recently Dx Rt Breast CA w/ suspected Mets (dx 7/2022), not currently on chemo/XRT. Adm w/ CVA, ARF, DIANN. Pt at nutritional risk d/t +Severe Malnutrition w/ noted wasting, poor intake and possible wt loss 2/2 Breast CA w/ COPD hx. Will Start ONS and monitor. Nutrition Related Findings:    AMSx2, U/L dentures, distended/non-tender Abd, Hypo BS, +2 RUE edema, -I/O's Wound Type: None       Current Nutrition Intake & Therapies:    Average Meal Intake: 26-50% (sporadic at times)  Average Supplements Intake: None Ordered  ADULT DIET; Regular; Low Sodium (2 gm)    Anthropometric Measures:  Height: 5' 7\" (170.2 cm)  Ideal Body Weight (IBW): 135 lbs (61 kg)    Admission Body Weight: 110 lb (49.9 kg) (actual 7/25)  Current Body Weight: 115 lb (52.2 kg) (bed 7/29),   IBW.  Weight Source: Bed Scale  Current BMI (kg/m2): 18  Usual Body Weight: 122 lb (55.3 kg) (bed 7/15/22 per EMR; noted possible ~10% loss x ~2wks however unable to confirm wt loss properly d/t pt w/ CHF hx and likely w/ possible fluid shifts pta)  % Weight Change (Calculated): -5.7 BMI Categories: Underweight (BMI less than 22) age over 72    Estimated Daily Nutrient Needs:  Energy Requirements Based On: Formula  Weight Used for Energy Requirements: Admission  Energy (kcal/day): 30-35kcal/kg Adm wt=   Weight Used for Protein Requirements: Admission  Protein (g/day): 1.5-1.8gm/kg Adm Wt= 75-90  Method Used for Fluid Requirements: 1 ml/kcal  Fluid (ml/day):     Nutrition Diagnosis:   Severe malnutrition, In context of chronic illness related to catabolic illness (2/2 Rt Breast CA and COPD hx) as evidenced by Criteria as identified in malnutrition assessment    Nutrition Interventions:   Food and/or Nutrient Delivery: Continue Current Diet, Start Oral Nutrition Supplement (Continue Diet. Will Start ONS and monitor.)  Nutrition Education/Counseling: No recommendation at this time  Coordination of Nutrition Care: Continue to monitor while inpatient       Goals:     Goals: PO intake 75% or greater, by next RD assessment       Nutrition Monitoring and Evaluation:   Behavioral-Environmental Outcomes: None Identified  Food/Nutrient Intake Outcomes: Food and Nutrient Intake, Supplement Intake  Physical Signs/Symptoms Outcomes: Biochemical Data, Chewing or Swallowing, GI Status, Fluid Status or Edema, Nutrition Focused Physical Findings, Skin, Weight    Discharge Planning:     Too soon to determine     Deborra MARIA Ruiz, LD  Contact: ext [phone]

## 2022-08-01 NOTE — PROGRESS NOTES
Date: 8/1/2022    Time: 12:16 AM    Patient Placed On BIPAP/CPAP/ Non-Invasive Ventilation? Yes    If no must comment. Facial area red/color change? No           If YES are Blister/Lesion present? No   If yes must notify nursing staff  BIPAP/CPAP skin barrier?   Yes    Skin barrier type:mepilexlite       Comments:        Belen Parham RCP

## 2022-08-02 ENCOUNTER — APPOINTMENT (OUTPATIENT)
Dept: ULTRASOUND IMAGING | Age: 67
DRG: 064 | End: 2022-08-02
Payer: MEDICARE

## 2022-08-02 LAB
BASOPHILS ABSOLUTE: 0.01 E9/L (ref 0–0.2)
BASOPHILS RELATIVE PERCENT: 0.2 % (ref 0–2)
EOSINOPHILS ABSOLUTE: 0.19 E9/L (ref 0.05–0.5)
EOSINOPHILS RELATIVE PERCENT: 3 % (ref 0–6)
HAPTOGLOBIN: 89 MG/DL (ref 30–200)
HCT VFR BLD CALC: 29.3 % (ref 34–48)
HEMOGLOBIN: 9 G/DL (ref 11.5–15.5)
IMMATURE GRANULOCYTES #: 0.03 E9/L
IMMATURE GRANULOCYTES %: 0.5 % (ref 0–5)
IMMATURE RETIC FRACT: 3.8 % (ref 3–15.9)
LACTATE DEHYDROGENASE: 278 U/L (ref 135–214)
LIPASE: 32 U/L (ref 13–60)
LYMPHOCYTES ABSOLUTE: 1.29 E9/L (ref 1.5–4)
LYMPHOCYTES RELATIVE PERCENT: 20.5 % (ref 20–42)
MCH RBC QN AUTO: 29.6 PG (ref 26–35)
MCHC RBC AUTO-ENTMCNC: 30.7 % (ref 32–34.5)
MCV RBC AUTO: 96.4 FL (ref 80–99.9)
METER GLUCOSE: 107 MG/DL (ref 74–99)
METER GLUCOSE: 110 MG/DL (ref 74–99)
METER GLUCOSE: 112 MG/DL (ref 74–99)
METER GLUCOSE: 90 MG/DL (ref 74–99)
MONOCYTES ABSOLUTE: 0.94 E9/L (ref 0.1–0.95)
MONOCYTES RELATIVE PERCENT: 14.9 % (ref 2–12)
NEUTROPHILS ABSOLUTE: 3.84 E9/L (ref 1.8–7.3)
NEUTROPHILS RELATIVE PERCENT: 60.9 % (ref 43–80)
PATHOLOGIST REVIEW: NORMAL
PDW BLD-RTO: 14.7 FL (ref 11.5–15)
PLATELET # BLD: 108 E9/L (ref 130–450)
PMV BLD AUTO: 10.1 FL (ref 7–12)
RBC # BLD: 3.04 E12/L (ref 3.5–5.5)
RETIC HGB EQUIVALENT: 33.4 PG (ref 28.2–36.6)
RETICULOCYTE ABSOLUTE COUNT: 0.04 E12/L
RETICULOCYTE COUNT PCT: 1.2 % (ref 0.4–1.9)
WBC # BLD: 6.3 E9/L (ref 4.5–11.5)

## 2022-08-02 PROCEDURE — 83690 ASSAY OF LIPASE: CPT

## 2022-08-02 PROCEDURE — 6370000000 HC RX 637 (ALT 250 FOR IP): Performed by: NURSE PRACTITIONER

## 2022-08-02 PROCEDURE — 2700000000 HC OXYGEN THERAPY PER DAY

## 2022-08-02 PROCEDURE — 97129 THER IVNTJ 1ST 15 MIN: CPT

## 2022-08-02 PROCEDURE — 36415 COLL VENOUS BLD VENIPUNCTURE: CPT

## 2022-08-02 PROCEDURE — 94660 CPAP INITIATION&MGMT: CPT

## 2022-08-02 PROCEDURE — 85045 AUTOMATED RETICULOCYTE COUNT: CPT

## 2022-08-02 PROCEDURE — 76705 ECHO EXAM OF ABDOMEN: CPT

## 2022-08-02 PROCEDURE — 97530 THERAPEUTIC ACTIVITIES: CPT

## 2022-08-02 PROCEDURE — 85025 COMPLETE CBC W/AUTO DIFF WBC: CPT

## 2022-08-02 PROCEDURE — 97535 SELF CARE MNGMENT TRAINING: CPT

## 2022-08-02 PROCEDURE — 6370000000 HC RX 637 (ALT 250 FOR IP): Performed by: STUDENT IN AN ORGANIZED HEALTH CARE EDUCATION/TRAINING PROGRAM

## 2022-08-02 PROCEDURE — 6360000002 HC RX W HCPCS: Performed by: STUDENT IN AN ORGANIZED HEALTH CARE EDUCATION/TRAINING PROGRAM

## 2022-08-02 PROCEDURE — 82962 GLUCOSE BLOOD TEST: CPT

## 2022-08-02 PROCEDURE — 6360000002 HC RX W HCPCS

## 2022-08-02 PROCEDURE — 2580000003 HC RX 258: Performed by: STUDENT IN AN ORGANIZED HEALTH CARE EDUCATION/TRAINING PROGRAM

## 2022-08-02 PROCEDURE — 83010 ASSAY OF HAPTOGLOBIN QUANT: CPT

## 2022-08-02 PROCEDURE — 97130 THER IVNTJ EA ADDL 15 MIN: CPT

## 2022-08-02 PROCEDURE — 83615 LACTATE (LD) (LDH) ENZYME: CPT

## 2022-08-02 PROCEDURE — 2060000000 HC ICU INTERMEDIATE R&B

## 2022-08-02 PROCEDURE — 94640 AIRWAY INHALATION TREATMENT: CPT

## 2022-08-02 PROCEDURE — 6370000000 HC RX 637 (ALT 250 FOR IP): Performed by: PHYSICIAN ASSISTANT

## 2022-08-02 PROCEDURE — 6370000000 HC RX 637 (ALT 250 FOR IP): Performed by: INTERNAL MEDICINE

## 2022-08-02 RX ORDER — BISACODYL 10 MG
10 SUPPOSITORY, RECTAL RECTAL ONCE
Status: DISCONTINUED | OUTPATIENT
Start: 2022-08-02 | End: 2022-08-04 | Stop reason: HOSPADM

## 2022-08-02 RX ORDER — MINERAL OIL 100 G/100G
1 OIL RECTAL ONCE
Status: DISCONTINUED | OUTPATIENT
Start: 2022-08-02 | End: 2022-08-04 | Stop reason: HOSPADM

## 2022-08-02 RX ADMIN — LORAZEPAM 1 MG: 1 TABLET ORAL at 21:16

## 2022-08-02 RX ADMIN — APIXABAN 5 MG: 5 TABLET, FILM COATED ORAL at 08:29

## 2022-08-02 RX ADMIN — LORAZEPAM 1 MG: 1 TABLET ORAL at 15:01

## 2022-08-02 RX ADMIN — IPRATROPIUM BROMIDE AND ALBUTEROL SULFATE 1 AMPULE: .5; 2.5 SOLUTION RESPIRATORY (INHALATION) at 21:07

## 2022-08-02 RX ADMIN — ROSUVASTATIN 20 MG: 20 TABLET, FILM COATED ORAL at 21:16

## 2022-08-02 RX ADMIN — IPRATROPIUM BROMIDE AND ALBUTEROL SULFATE 1 AMPULE: .5; 2.5 SOLUTION RESPIRATORY (INHALATION) at 09:01

## 2022-08-02 RX ADMIN — PANTOPRAZOLE SODIUM 40 MG: 40 TABLET, DELAYED RELEASE ORAL at 06:20

## 2022-08-02 RX ADMIN — APIXABAN 5 MG: 5 TABLET, FILM COATED ORAL at 21:16

## 2022-08-02 RX ADMIN — WATER 1000 MG: 1 INJECTION INTRAMUSCULAR; INTRAVENOUS; SUBCUTANEOUS at 13:40

## 2022-08-02 RX ADMIN — BUDESONIDE 500 MCG: 0.5 SUSPENSION RESPIRATORY (INHALATION) at 09:01

## 2022-08-02 RX ADMIN — IPRATROPIUM BROMIDE AND ALBUTEROL SULFATE 1 AMPULE: .5; 2.5 SOLUTION RESPIRATORY (INHALATION) at 12:23

## 2022-08-02 RX ADMIN — LORAZEPAM 1 MG: 1 TABLET ORAL at 00:42

## 2022-08-02 RX ADMIN — BUDESONIDE 500 MCG: 0.5 SUSPENSION RESPIRATORY (INHALATION) at 21:07

## 2022-08-02 RX ADMIN — ARFORMOTEROL TARTRATE 15 MCG: 15 SOLUTION RESPIRATORY (INHALATION) at 09:01

## 2022-08-02 RX ADMIN — ARFORMOTEROL TARTRATE 15 MCG: 15 SOLUTION RESPIRATORY (INHALATION) at 21:07

## 2022-08-02 RX ADMIN — IPRATROPIUM BROMIDE AND ALBUTEROL SULFATE 1 AMPULE: .5; 2.5 SOLUTION RESPIRATORY (INHALATION) at 16:26

## 2022-08-02 RX ADMIN — ASPIRIN 81 MG CHEWABLE TABLET 81 MG: 81 TABLET CHEWABLE at 08:29

## 2022-08-02 RX ADMIN — Medication 5 MG: at 21:16

## 2022-08-02 ASSESSMENT — PAIN SCALES - GENERAL
PAINLEVEL_OUTOF10: 0
PAINLEVEL_OUTOF10: 0

## 2022-08-02 NOTE — PROGRESS NOTES
Patient refusing to wear BiPAP at this time. Stated \"I've worn this long enough! \". Patient educated on importance of wearing mask at night, patient refused teaching. Patient placed back on NC at 2L. Patient SpO2 98%. Will monitor patient closely.

## 2022-08-02 NOTE — PROGRESS NOTES
Hospitalist Progress Note      SYNOPSIS: Patient admitted on 7/25/2022     71-year-old female patient from home with medical history of severe pulmonary hypertension, tobacco use, right breast cancer, COPD, MDD, hypertension who was brought to the ED with concern for stroke. Patient last known normal well was around midnight last night. Family found her today around noon with left-sided weakness and facial droop. Upon EMS arrival at the scene patient had saturation of 56% on room air and placed on nonrebreather mask. Patient was recently diagnosed with breast cancer 10 days ago for which she was discharged from this facility. No report of fever, cough, CP, abdominal pain, n/v.   Stroke alert was activated on arrival to ED. CT scanning of the head showed subacute infarction described as area of ischemia in the right parietal and left occipital lobes. No hemorrhagic, significant mild static, or midline shift. At CTA of the head and neck obtained with no major intracranial arterial stenosis but 70% stenosis of the right proximal ICA. Telestroke was consulted and patient was out of the window for tPA given completed infarct, also per neuro recommendation patient with likely hypercoagulable state this ICA daily managed medically, with DAPT. Patient was given aspirin on the ED  Remaining ED work-up showing normal white blood cells, hemoglobin, elevated hematocrit, potassium 6.4, bicarb 32, BUN 73, creatinine 2.1, ABG showing pH 7.23, PCO2 76.7, PO2 202, troponin 66, and proBNP 17,428. EKG sinus bradycardia 59, normal QT, no ST segment changes. CTA pulmonary with contrast with no evidence of PE.   Redemonstration of right breast mass with right axillary lymphadenopathy and multiple enlarged mediastinal lymph nodes  Patient was admitted for further management of subacute stroke, acute hypercapnic and hypoxic respiratory failure     SUBJECTIVE:    Patient seen and examined at bedside, continues to endorse epigastric pain, no nausea or vomiting  Able to void urine without trouble     records reviewed. Stable overnight. No other overnight issues reported. Temp (24hrs), Av.8 °F (36.6 °C), Min:97 °F (36.1 °C), Max:98.5 °F (36.9 °C)    DIET: Diet NPO  CODE: Prior    Intake/Output Summary (Last 24 hours) at 2022 1239  Last data filed at 2022 2330  Gross per 24 hour   Intake 200 ml   Output 150 ml   Net 50 ml         OBJECTIVE:    BP (!) 106/59   Pulse 63   Temp 98.5 °F (36.9 °C) (Oral)   Resp 18   Ht 5' 7\" (1.702 m)   Wt 115 lb 1.6 oz (52.2 kg)   SpO2 97%   BMI 18.03 kg/m²     General appearance: No apparent distress, appears stated age and cooperative. HEENT: Normal cephalic, atraumatic without obvious deformity. Pupils equal, round, and reactive to light. Extra ocular muscles intact. Conjunctivae/corneas clear. Neck: Supple, with full range of motion. No jugular venous distention. Trachea midline. Respiratory: Creased respiratory effort, on BiPAP, expiratory wheezing, no crackles  Cardiovascular: Bradycardic, no murmur gallops or rubs  Abdomen: Nondistended, normal BS, nontender, no visceromegaly  Musculoskeletal: No clubbing, cyanosis, right foot erythema and edema. Brisk capillary refill. Skin: Normal skin color. No rashes or lesions. Neurologic:  Neurovascularly intact without any focal sensory/motor deficits. Cranial nerves: II-XII intact, grossly non-focal.  Left-sided hemiparesis and left-sided lower facial droop  Psychiatric: Alert and oriented, thought content appropriate, normal insight       ASSESSMENT:    Acute ischemic stroke. MRI : Acute or early subacute infarctions in the right frontal lobe and the left occipital lobe. Patient is already on aspirin and Plavix. Neurology consulted from admission and recommendations appreciated. As patient has multiple location of anterior and posterior stroke embolic source is suspected.   Neurology recommending AUDREY if oncology does not feel that this is a result of a hypercoagulable state, otherwise neurology recommended just to start oral anticoagulants and no further work-up indicated. Patient to be started on Eliquis. Therefore Plavix will be discontinued    Acute hypoxic and hypercapnic respiratory failure. With a history of COPD. Mild exacerbation on admission. Received [place] of the steroids and DuoNeb's. Resolved  Hyper kalemia in the course of DIANN and dehydration. Resolved  Acute respiratory acidosis  DIANN  Elevated troponin in the setting of acute respiratory failure and a stroke. -unlikely ACS, likely demand ischemia  Recent diagnosis of right breast cancer. Status post ultrasound-guided core biopsy of the right axillary lymphadenopathy. 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 Oncology following. Additional tissue diagnosis  Severe pulmonary hypertension  History of tobacco use  History of MDD/and anxiety  Hypertension  Right foot edema. Right lower extremity venous Doppler negative for DVT. A right lower extremity arterial Doppler normal  Sinus bradycardia ( Hypotension and bradycardia. Cosyntropin stimulation test showed normal cortisol but obtained throughout steroids on ED  Deconditioning, impaired gait. Patient needs wheelchair for safety transportation. A cane or a walker are not safe tools at this moment. Increased risk for falls  -Abdominal pain. Right upper quadrant ultrasound demonstrated cholelithiasis with no sonographic signs of cholecystitis. Lipase is normal       PLAN:    -Breathing treatments due neb scheduled. Completed short course steroids  -BiPAP QHS and daytime as needed   -Continue aspirin.   Plavix discontinued as patient was started on Eliquis for hypercoagulable state  -Oncology following and plan to go for outpatient treatment  -ceftriaxone for UTI completed  -Continue PPI  -Proper equipment for home has been ordered. However family has changed their mind and patient now to go to subacute rehab. Outpatient has been evaluated and deemed appropriate For acute rehab. Awaiting for final decision whether patient is going to acute rehab or being discharged home       DISPOSITION: Acute rehab    Medications:  REVIEWED DAILY    Infusion Medications    dextrose      dextrose       Scheduled Medications    mineral oil  1 enema Rectal Once    Followed by    bisacodyl  10 mg Rectal Once    pantoprazole  40 mg Oral QAM AC    cefTRIAXone (ROCEPHIN) IV  1,000 mg IntraVENous Q24H    aspirin  81 mg Oral Daily    apixaban  5 mg Oral BID    budesonide  500 mcg Nebulization BID    Arformoterol Tartrate  15 mcg Nebulization BID    melatonin  5 mg Oral Nightly    ipratropium-albuterol  1 ampule Inhalation Q4H WA    rosuvastatin  20 mg Oral Nightly     PRN Meds: polyethylene glycol, bisacodyl, aluminum & magnesium hydroxide-simethicone, LORazepam, glucose, dextrose bolus **OR** dextrose bolus, glucagon (rDNA), dextrose, glucose, dextrose bolus **OR** dextrose bolus, glucagon (rDNA), dextrose, [COMPLETED] insulin regular **AND** [COMPLETED] dextrose **AND** POCT Glucose **AND** POCT Glucose **AND** dextrose    Labs:     Recent Labs     08/01/22 0447 08/01/22  1251 08/02/22  1131   WBC 8.3 7.7 6.3   HGB 9.6* 9.5* 9.0*   HCT 30.5* 30.2* 29.3*   PLT 93* 95* 108*       Recent Labs     07/31/22  0553      K 4.6   CL 99   CO2 30*   BUN 32*   CREATININE 0.7   CALCIUM 7.9*       Recent Labs     08/01/22 0447 08/02/22  1131   PROT 4.3*  --    ALKPHOS 54  --    ALT 61*  --    AST 30  --    BILITOT 0.7  --    LIPASE  --  32         No results for input(s): INR in the last 72 hours. No results for input(s): Assunta Kaplan in the last 72 hours.       Chronic labs:    Lab Results   Component Value Date    CHOL 170 07/26/2022    TRIG 121 07/26/2022    HDL 36 07/26/2022    LDLCALC 110 (H) 07/26/2022    TSH 2.510 07/14/2022    INR 2.0 07/25/2022    LABA1C 5.8 (H) 07/26/2022       Radiology: REVIEWED DAILY    +++++++++++++++++++++++++++++++++++++++++++++++++  Dusty Benson MD  [facility] Phys[facility] [address]  +++++++++++++++++++++++++++++++++++++++++++++++++  NOTE: This report was transcribed using voice recognition software. Every effort was made to ensure accuracy; however, inadvertent computerized transcription errors may be present.

## 2022-08-02 NOTE — PROGRESS NOTES
Physical Therapy  Physical Therapy  rx     Name: Jesus Ramos  : 1955  MRN: [id number]      Date of Service: 2022    Evaluating PT:  Priscilla Goode PT, DPT [id number]    Room #:  8368/1001-N  Diagnosis:  Hyperkalemia [E87.5]  Acute ischemic stroke [facility]) [I63.9]  Acute kidney injury ([facility] [N17.9]  Acute respiratory failure with hypoxia and hypercapnia ([facility] [J96.01, J96.02]  Cerebrovascular accident (CVA), unspecified mechanism ([facility] [I63.9]  PMHx/PSHx:    Past Medical History:   Diagnosis Date    Anxiety     Arthritis     Cancer ([facility]     Chronic bronchitis ([facility]     Emphysema     according to xray report 2013    Walking pneumonia      Precautions:  Fall risk, Alarms, Cognition, L hemiparesis, Mets  Equipment Needs:  TBD    SUBJECTIVE:  *Subjective information limited secondary to confusion  Pt lives with her [de-identified] year old grandson but states that she will be discharging to her sisters home where she will have first floor living accommodations. OBJECTIVE:   Initial Evaluation  Date: 22 Treatment   22 Short Term/ Long Term   Goals   AM-PAC 6 Clicks 97/45 69/52    Was pt agreeable to Eval/treatment? Yes Yes     Does pt have pain? No Abdomen pain     Bed Mobility  Rolling: NT  Supine to sit: Min A  Sit to supine: SBA  Scooting: Mod A Rolling  nt  Supine to sit min A  Sit to supine min A  Scooting min A Rolling: Independent  Supine to sit: Independent  Sit to supine: Independent  Scooting: Independent   Transfers Sit to stand:  Mod A  Stand to sit: Mod A  Stand pivot: NT Sit to stand min A   Stand to sit  min A  Stand pivot with ww with mod A  Sit to stand: Supervision  Stand to sit: Supervision  Stand pivot: Supervision   Ambulation    NT  20 feet with ww with mod A    50 feet with AAD supervision   Stair negotiation: ascended and descended  NT NT 2 steps with bilateral rail SBA   ROM BUE:  Refer to OT  BLE:  WFL     Strength BUE:  Refer to OT  BLE:  4/5  4+/5   Balance Sitting EOB:  Mod A static and dynamic  Dynamic Standing:  NT  Sitting EOB:  Independent  Dynamic Standing:  Supervision       Patient education  Pt educated on hand placement with transfers     Patient response to education:   Pt verbalized understanding Pt demonstrated skill Pt requires further education in this area   x With assist  x     ASSESSMENT:    Comments:  Nursing cleared pt for physical therapy . Pt in bed upon arrival and agreed to participate in therapy with minimal encouragement. Pt reported abdomen pain and nursing notified. Pt completed functional mobility as noted above. Pt use a ww with gait this date due pt able to  walker however pt fatigues quickly. Decreased coordination , strength and L ankle instability noted with gait. Assistance with walker management with increased fatigue. Encouraged pt to sit in chair however pt requested to return to supine despite encouragement. Pt left in supine with call light in reach. Pt on room air with activity and O2 levels were 88-92%. Treatment:  Patient practiced and was instructed in the following treatment:    Bed mobility- verbal instruction for technique with bed mobility. Assistance required to complete task. Transfers  Verbal instruction for hand placement and technique to improve safety and balance. Assistance required to complete task. Ambulation  - Verbal instruction for walker management and L  on ww to improve safety and balance. Assistance required to complete task. PLAN:    Patient is making  progress towards established goals. Will continue with current POC.       Time in 1145  Time out 1215    Total Treatment Time  30 minutes       CPT codes:  [x] Therapeutic activities 04020  30minutes  [] Therapeutic exercises 03166 0 minutes      Amara Figueroa [id number]

## 2022-08-02 NOTE — PLAN OF CARE
Problem: Chronic Conditions and Co-morbidities  Goal: Patient's chronic conditions and co-morbidity symptoms are monitored and maintained or improved  Outcome: Progressing     Problem: Discharge Planning  Goal: Discharge to home or other facility with appropriate resources  Outcome: Progressing     Problem: Confusion  Goal: Confusion, delirium, dementia, or psychosis is improved or at baseline  Outcome: Progressing     Problem: Skin/Tissue Integrity  Goal: Absence of new skin breakdown  Outcome: Progressing     Problem: Safety - Adult  Goal: Free from fall injury  Outcome: Progressing     Problem: Pain  Goal: Verbalizes/displays adequate comfort level or baseline comfort level  Outcome: Progressing     Problem: Cardiovascular - Adult  Goal: Maintains optimal cardiac output and hemodynamic stability  Outcome: Progressing     Problem: Metabolic/Fluid and Electrolytes - Adult  Goal: Hemodynamic stability and optimal renal function maintained  Outcome: Progressing     Problem: Nutrition Deficit:  Goal: Optimize nutritional status  Outcome: Progressing

## 2022-08-02 NOTE — PROGRESS NOTES
Rozina Powers M.D.,[facility]  Richie Perez D.O., F.A.C.OLEIGHA., Gini Jordan M.D. Jennifer Pool M.D. Rosa Maria Barrett D.O. Daily Pulmonary Progress Note    Patient:  Jennifer Newman 78 y.o. female MRN: [id number]     Date of Service: 8/2/2022      Synopsis     We are following patient for respiratory failure with hypoxia and hypercapnia, copd exacerbation    \"CC\" recent stroke    Code status: FULL       Subjective   Patient seen and examined today on room air to determine home oxygen needs. She de-saturated to 87% on room air. She is not ambulatory therefore unable to determine saturations with ambulation. She will need 2L oxygen for home. She will also need a non-invasive ventilator for home. She retains CO2 very easily and will need a higher mode of ventilation. See note below. She has a nebulizer at home but will need medication. Will order. Primary care to order a nicotine patch for smoking cessation. Lung sounds are CTA today. She is still a little disoriented. 8/1/22 feeling ok today. Still with daytime hypersomnolence. oxygen at 2 liters NC. Dc to SNF.  8/2/2022-patient did not use NIV for very long last night due to nausea and anxiety. Currently on oxygen at 3 L nasal cannula. Awaiting rehab placement. Continues to complain of dyspepsia.   Review of Systems:  Not able to obtain due to mentation    24-hour events:  MRI completed    Objective   Vitals: BP (!) 106/59   Pulse 63   Temp 98.5 °F (36.9 °C) (Oral)   Resp 18   Ht 5' 7\" (1.702 m)   Wt 115 lb 1.6 oz (52.2 kg)   SpO2 97%   BMI 18.03 kg/m²     I/O:    Intake/Output Summary (Last 24 hours) at 8/2/2022 1228  Last data filed at 8/1/2022 2330  Gross per 24 hour   Intake 200 ml   Output 150 ml   Net 50 ml          CPAP/EPAP: 5 cmH2O     CURRENT MEDS :  Scheduled Meds:   mineral oil  1 enema Rectal Once    Followed by    bisacodyl  10 mg Rectal Once    pantoprazole  40 mg Oral QAM AC    cefTRIAXone (ROCEPHIN) IV  1,000 mg IntraVENous Q24H    aspirin  81 mg Oral Daily    apixaban  5 mg Oral BID    budesonide  500 mcg Nebulization BID    Arformoterol Tartrate  15 mcg Nebulization BID    melatonin  5 mg Oral Nightly    ipratropium-albuterol  1 ampule Inhalation Q4H WA    rosuvastatin  20 mg Oral Nightly       Physical Exam:  General Appearance: appears comfortable in no acute distress. Frail cachexia  HEENT: Normocephalic atraumatic without obvious abnormality   Neck: Lips, mucosa, and tongue normal.  Supple, symmetrical, trachea midline, no adenopathy;thyroid:  no enlargement/tenderness/nodules or JVD. Lung: Breath sounds diminished. Respirations   unlabored. Symmetrical expansion. Heart: RRR, normal S1, S2. No MRG  Abdomen: Soft, NT, ND. BS present x 4 quadrants. No bruit or organomegaly. Extremities: Pedal pulses 2+ symmetric b/l. Extremities normal, no cyanosis, clubbing, or edema. Musculokeletal: No joint swelling, no muscle tenderness. ROM normal in all joints of extremities. Neurologic: Mental status: hypersomnolence improved confusion. Still with some confusion    Pertinent/ New Labs and Imaging Studies     Imaging Personally Reviewed:  KUB 8/1/2022  FINDINGS:   Nonspecific bowel gas pattern without evidence of obstruction. No abnormal   calcifications. No acute osseous abnormality. Some high density stool is   noted in the left colon and rectosigmoid colon. No evidence of intestinal   obstruction or ileus. Mild degenerative changes in the mid lumbar spine. No   free air. Impression   No evidence of mechanical obstruction or significant ileus. Mild amount of   high density stool in the rectosigmoid colon. CTA pulmonary 7/25  1. Redemonstration of right breast mass with right axillary lymphadenopathy. 2. No evidence of pulmonary embolism. 3. Redemonstration of multiple enlarged mediastinal lymph nodes notable in   lower paratracheal location.    4. Moderate cardiomegaly with findings suggesting right heart failure. 5. Stable atelectatic changes in left lung base. 6. Peribronchial thickening bilaterally suggesting inflammation with sites of   bronchial stenosis notable in posterior segments of the lower lobes. CT brain perfusion 7/25  1. Area of ischemia is seen in the right parietal lobe, corresponding to the   site of edema identified on the CT of the head. CTA OF THE HEAD:       1. No major arterial stenosis is seen in the brain. CTA OF THE NECK:       1. Approximately 70% stenosis in the proximal right ICA. 2. Less than 50% stenosis in the proximal left ICA. 3. Approximately 50% stenosis of the origin of the left vertebral artery. 4. No significant stenosis in the right vertebral artery      CT head WO contrast 7/25  1. LIKELY SUBACUTE INFARCTIONS in the right parietal and left occipital lobes. 2. No hemorrhage, significant mass effect or midline shift. MRI 7/28  1. Acute or early subacute infarctions in the right frontal lobe and the left   occipital lobe. 2. No hemorrhage, significant mass effect or midline shift. Echo 7/13/22   Summary   Ejection fraction is visually estimated at 50%. Dilated right ventricle and reduced right ventricular function (TAPSE 1.0   cm). Indeterminate diastolic function. Severely dilated right atrium. Mild mitral regurgitation. Mild aortic regurgitation. Moderate tricuspid regurgitation.    RV-RA gradient is estimated at 58 mmHg      Labs:  Lab Results   Component Value Date/Time    WBC 6.3 08/02/2022 11:31 AM    HGB 9.0 08/02/2022 11:31 AM    HCT 29.3 08/02/2022 11:31 AM    MCV 96.4 08/02/2022 11:31 AM    MCH 29.6 08/02/2022 11:31 AM    MCHC 30.7 08/02/2022 11:31 AM    RDW 14.7 08/02/2022 11:31 AM     08/02/2022 11:31 AM    MPV 10.1 08/02/2022 11:31 AM     Lab Results   Component Value Date/Time     07/31/2022 05:53 AM    K 4.6 07/31/2022 05:53 AM    K 6.4 07/25/2022 02:41 PM    CL 99 07/31/2022 05:53 AM CO2 30 07/31/2022 05:53 AM    BUN 32 07/31/2022 05:53 AM    CREATININE 0.7 07/31/2022 05:53 AM    LABALBU 2.5 08/01/2022 04:47 AM    CALCIUM 7.9 07/31/2022 05:53 AM    GFRAA >60 07/31/2022 05:53 AM    LABGLOM >60 07/31/2022 05:53 AM     Lab Results   Component Value Date/Time    PROTIME 22.5 07/25/2022 02:05 PM    INR 2.0 07/25/2022 02:05 PM     No results for input(s): PROBNP in the last 72 hours. No results for input(s): PROCAL in the last 72 hours. This SmartLink has not been configured with any valid records. Micro:  No results for input(s): CULTRESP in the last 72 hours. No results for input(s): LABGRAM in the last 72 hours. No results for input(s): LEGUR in the last 72 hours. No results for input(s): STREPNEUMAGU in the last 72 hours. No results for input(s): LP1UAG in the last 72 hours. Repeat abgs on AVAPS 400 + 5 EPAP Latest Reference Range & Units 7/26/22 17:59   pH, Blood Gas 7.350 - 7.450  7.327 (L)   PCO2 35.0 - 45.0 mmHg 72.3 (HH)   pO2 75.0 - 100.0 mmHg 92.3   HCO3 22.0 - 26.0 mmol/L 37.0 (H)      Latest Reference Range & Units 7/28/22 10:03   pH, Blood Gas 7.350 - 7.450  7.406   PCO2 35.0 - 45.0 mmHg 65.8 (H)   pO2 75.0 - 100.0 mmHg 108.4 (H)   HCO3 22.0 - 26.0 mmol/L 40.4 (H)   Base Excess -3.0 - 3.0 mmol/L 12.2 (H)   O2 Sat 92.0 - 98.5 % 97.8       Assessment:    Acute respiratory failure with hypoxia and hypercapnia  Stoke in right middle cerebral artery territory v. Tumor metastatic disease  New diagnosis of breast cancer with metastasis.    DIANN resolved  Hyperkalemia resolved  Emphysema with chronic scarring  Atelectasis  Possible central ELANA, recent stroke  Pulmonary HTN, RVSP 58  Tobacco abuse       Plan:   Wean oxygen as tolerated to keep SpO2 >90%, on 2 L NC, will need oxygen at 2L continuous via nasal cannula for home  AVAPS 400 + 5 at HS and alternate during day for respiratory support- will need NIV for home when dc from rehab  Trend abg, with improvement 7/28/22  Duonelewis Q4H WA,  bronchodilators: Pulmicort/Brovana BID, will order meds for home, (Trelegy, Albuterol nebs)  Chest CT  imaging reviewed, (-) PE, emphysema with chronic scarring noted, bibasilar atelectasis  Steroids completed  Rocephin completed for UTI per primary  Neurology for CVA- Plavix and ASA- did not receive tPa. Hematology/oncology on board  Smoking cessation education   Palliative medicine to assist with goals of care, code status discussion  [facility] Case management being set up through [address] for NIV  Okay to discharge from a pulmonary perspective when okay with all others  This patient has chronic respiratory failure due to end stage COPD. Her condition has worsened and her quality of life has deteriorated. A non invasive home ventilator is required at this time as the patient has a significant risk of dying from acute on top of chronic hypercapnic respiratory failure if she doesn't get to the hospital in time. Patient has used Bipap in the past but it has been ineffective in managing her hypercapnic respiratory failure. It is my professional opinion that the patient needs treated with an augmented tidal volume to resolve the CO2 retention that is not available with a Bipap or [address] This patient would benefit from non-invasive therapy, utilizing AVAPS AE to provide a targeted tidal volume. Her last PFT's show severe COPD. It is therefore required that due to the severity of her lung disease a non invasive home ventilator is medically necessary to aid in the management of her severe condition. Without this advanced therapy this patient is at high risk for a life threatening exacerbation of COPD requiring frequent hospital readmissions. This plan of care was reviewed in collaboration with Dr. Dick Ochoa  Electronically signed by ANGELITA Vaz CNP on 8/2/2022 at 12:28 PM      I personally saw, examined, and cared for the patient. Labs, medications, radiographs reviewed.  I agree

## 2022-08-02 NOTE — CARE COORDINATION
Care Coordination: Patient re evaluated by ARU yesterday and accepted . Patient agrees to plan for ARU. PT OT updates requested and plan is that ARU will submit for pre cert today.

## 2022-08-02 NOTE — PROGRESS NOTES
JODYW met with pt to review needs. She is alert, states she does not feel any better. JODYW spoke with her son Tri Fox, he will be in this evening and will provide updated contact information for pts other children. Meanwhile he says the family did discuss code status and would like full code. Their goal is for pt to attend rehab. Code status information left at bedside.

## 2022-08-02 NOTE — PLAN OF CARE
Problem: Chronic Conditions and Co-morbidities  Goal: Patient's chronic conditions and co-morbidity symptoms are monitored and maintained or improved  8/2/2022 1516 by David Betancur RN  Outcome: Progressing  8/2/2022 0218 by Gabriella Myers RN  Outcome: Progressing     Problem: Confusion  Goal: Confusion, delirium, dementia, or psychosis is improved or at baseline  Description: INTERVENTIONS:  1. Assess for possible contributors to thought disturbance, including medications, impaired vision or hearing, underlying metabolic abnormalities, dehydration, psychiatric diagnoses, and notify attending LIP  2. [place] high risk fall precautions, as indicated  3. Provide frequent short contacts to provide reality reorientation, refocusing and direction  4. Decrease environmental stimuli, including noise as appropriate  5. Monitor and intervene to maintain adequate nutrition, hydration, elimination, sleep and activity  6. If unable to ensure safety without constant attention obtain sitter and review sitter guidelines with assigned personnel  7.  Initiate Psychosocial CNS and Spiritual Care consult, as indicated  8/2/2022 1516 by David Betancur RN  Outcome: Progressing  8/2/2022 0218 by Gabriella Myers RN  Outcome: Progressing     Problem: Safety - Adult  Goal: Free from fall injury  8/2/2022 1516 by David Betancur RN  Outcome: Progressing  8/2/2022 0218 by Gabriella Myers RN  Outcome: Progressing

## 2022-08-02 NOTE — PRE-CERTIFICATION NOTE
[facility] initiated and clinicals faxed.

## 2022-08-02 NOTE — PROGRESS NOTES
Occupational Therapy  OT BEDSIDE TREATMENT NOTE   [facility] [address]  123 [facility], [address]  Patient Name: Issac Neal  MRN: [id number]  : 1955  Room: [address] [place] [address]     Per OT Eval:    Evaluating [address], OTR/L #[id number]     Referring Provider: Elenita Harris MD  Specific Provider Orders/Date: OT eval and treat 22     Diagnosis: Hyperkalemia [E87.5]  Acute ischemic stroke [facility]) [I63.9]  Acute kidney injury ([facility] [address] [N17.9]  Acute respiratory failure with hypoxia and hypercapnia ([facility] [address] [J96.01, J96.02]  Cerebrovascular accident (CVA), unspecified mechanism ([facility] [address] [I63.9]   Pt admitted to hospital on 22 for global L side weakness and facial droop. Respiratory  Per chart, recently diagnosed w/ breast CA ~10 days prior to admission     Pertinent Medical History:  has a past medical history of Anxiety, Arthritis, Cancer ([address] Chronic bronchitis ([address] Emphysema, and Walking pneumonia.       Precautions:  Fall Risk, cognition, L hemiparesis, O2, bed alarm  Recent diagnosis of breast CA w/ mets     Assessment of current deficits   [x] Functional mobility         [x]ADLs           [x] Strength                  [x]Cognition   [x] Functional transfers       [x] IADLs         [x] Safety Awareness   [x]Endurance   [x] Fine Coordination                      [x] Balance      [] Vision/perception   []Sensation      []Gross Motor Coordination          [x] ROM           [] Delirium                   [] Motor Control     OT PLAN OF CARE   OT POC based on physician orders, patient diagnosis and results of clinical assessment     Frequency/Duration 2-3 days/wk for 2 weeks PRN  Specific OT Treatment Interventions to include:   * Instruction/training on adapted ADL techniques and AE recommendations to increase functional independence within precautions       * Training on energy conservation strategies, correct breathing pattern and techniques to improve independence/tolerance for self-care routine  * Functional transfer/mobility training/DME recommendations for increased independence, safety, and fall prevention  * Patient/Family education to increase follow through with safety techniques and functional independence  * Recommendation of environmental modifications for increased safety with functional transfers/mobility and ADLs  * Cognitive retraining/development of therapeutic activities to improve problem solving, judgement, memory, and attention for increased safety/participation in ADL/IADL tasks  * Therapeutic exercise to improve motor endurance, ROM, and functional strength for ADLs/functional transfers  * Therapeutic activities to facilitate/challenge dynamic balance, stand tolerance for increased safety and independence with ADLs  * Therapeutic activities to facilitate gross/fine motor skills for increased independence with ADLs  * Positioning to improve skin integrity, interaction with environment and functional independence     OTMRS   Modified Amanda Scale (MRS)  Score     Description  0             No symptoms  1             No significant disability despite symptoms  2             Slight disability; able to look after own affairs  3             Moderate disability; able to ambulate without assist/ requires assist with ADLs  4             Moderate/Severe disability;requires assist to ambulate/assist with ADLs  5             Severe disability;bedridden/incontinent  6               Score:   4     Recommended Adaptive Equipment: TBD     Home Living: Pt lives with grandson (4 y/o) however pt verbalized will be staying at sister's 2 floor home (1st floor setup). Pt is a very questionable historian. Prior Level of Function:  unable to obtain accurate PLOF from pt d/t cognition.  Family/caregiver not present     Pain Level: abdominal pain, nsg aware, chronic per pt      Cognition: A&O: 3/4; Follows 1-2 step directions, pleasant & cooperative very fatigued           Memory:  fair            Sequencing:  fair            Problem solving: fair-           Judgement/safety: fair-             Functional Assessment:  AM-PAC Daily Activity Raw Score: 15/24    Initial Eval Status  Date: 7/29/22 Treatment Status  Date: 8/2/22 STGs = LTGs  Time frame: 10-14 days   Feeding Minimal Assist NPO  For test this date Supervision    Grooming Moderate Assist   Washed hands while seated EOB Min A  Using R UE, but able to use L UE as a \"helper\" Supervision    UB Dressing Moderate Assist Min A   Doff/mazin gown seated at EOB, good recall of andrea-dressing techniques  Stand by Assist    LB Dressing Dependent Mod A  Due to weakness overall, B LE, kanu with L LE Moderate Assist    Bathing Maximal Assist Mod A  simulated Minimal Assist    Toileting Maximal Assist Mod A  Using BSC, assist with hygiene due to decreased standing balance, using R UE for stability, unable to use L UE to perform hygiene  Minimal Assist    Bed Mobility Supine to sit: Minimal Assist   Sit to supine: Minimal Assist  Min A  Supine<>EOB    HOB slightly elevated Supine to sit: Supervision   Sit to supine: Supervision    Functional Transfers Moderate Assist Min A  Sit to Stand  Stand to Sit  Mod A  Stand pivota  Stand Pivot Transfer EOB<>BSC Stand by Assist    Functional Mobility NT  Secondary to safety concerns Mod A  With walker, mildly unsteady, assist to hold L hand onto walker & manager walker as pt fatigue, able to complete short household assist Stand by Assist    Balance Sitting:    Static:  Mod A (posterolateral lean to R)    Dynamic:Mod A  Standing: Mod A w/ HHA  Sitting EOB:  SBA    Standing:  Min/Mod A     Activity Tolerance Fair- Fair-   Overall weakness,  Fair+   Visual/  Perceptual Glasses: yes  L inattention noted during functional tasks.  Unable to formally assess d/t cognition                          Hand Dominance R    AROM (PROM) Strength Additional Info:   JOSEPH GLORIA 4-/5 good  and wfl FMC/dexterity noted during ADL tasks         LUE Distal, proximal AROM: limited  PROM: WFL    Shoulder flexion: 2-/5  Elbow flexion: 2/5  Hand: 2/5 Poor  and poor FMC/dexterity noted during ADL tasks  Impaired proprioception L UE      Hearing: WFL  Sensation:  c/o numbness L hand  Tone: WFL  Edema: none noted    Education:  Pt was educated on role of OT, goals to be reached, importance of OOB activity, safety and hand placement with transfers, safety/balance with functional mobility, and andrea dressing techniques to assist with UB/LB dressing tasks    Comments: Upon arrival pt supine in bed, agreeable to therapy, speaking with nursing okaying pt to be seen this session. Pt completed of bed mobility, functional mobility, transfers and ADL tasks this session. At end of session, pt declined to sit up in chair due to belly pain, nsg present, therefore was returned to bed, all lines and tubes intact, call light within reach. Bed alarm set. Pt has made fair progress towards set goals. Continue with current plan of care focusing on increasing of independency with transfers and ADL tasks      Treatment Time In: 11:45am           Treatment Time Out: 12:15am               Treatment Charges: Mins Units   Ther Ex  29988     Manual Therapy 52501     Thera Activities 89933 10 1   ADL/Home Mgt 48418 20 1   Neuro Re-ed 75430     Group Therapy      Orthotic manage/training  74613     Non-Billable Time     Total Timed Treatment 30 2        Deena DE LA [address], [address] [place] [address]

## 2022-08-03 PROBLEM — G89.3 PAIN DUE TO NEOPLASM: Status: ACTIVE | Noted: 2022-08-03

## 2022-08-03 PROCEDURE — 2700000000 HC OXYGEN THERAPY PER DAY

## 2022-08-03 PROCEDURE — 6360000002 HC RX W HCPCS

## 2022-08-03 PROCEDURE — 6370000000 HC RX 637 (ALT 250 FOR IP): Performed by: STUDENT IN AN ORGANIZED HEALTH CARE EDUCATION/TRAINING PROGRAM

## 2022-08-03 PROCEDURE — 6370000000 HC RX 637 (ALT 250 FOR IP)

## 2022-08-03 PROCEDURE — 6370000000 HC RX 637 (ALT 250 FOR IP): Performed by: NURSE PRACTITIONER

## 2022-08-03 PROCEDURE — 6370000000 HC RX 637 (ALT 250 FOR IP): Performed by: INTERNAL MEDICINE

## 2022-08-03 PROCEDURE — 97129 THER IVNTJ 1ST 15 MIN: CPT

## 2022-08-03 PROCEDURE — 6370000000 HC RX 637 (ALT 250 FOR IP): Performed by: PHYSICIAN ASSISTANT

## 2022-08-03 PROCEDURE — 1200000000 HC SEMI PRIVATE

## 2022-08-03 PROCEDURE — 6370000000 HC RX 637 (ALT 250 FOR IP): Performed by: CLINICAL NURSE SPECIALIST

## 2022-08-03 PROCEDURE — 94660 CPAP INITIATION&MGMT: CPT

## 2022-08-03 PROCEDURE — 97130 THER IVNTJ EA ADDL 15 MIN: CPT

## 2022-08-03 PROCEDURE — 6360000002 HC RX W HCPCS: Performed by: STUDENT IN AN ORGANIZED HEALTH CARE EDUCATION/TRAINING PROGRAM

## 2022-08-03 PROCEDURE — 94640 AIRWAY INHALATION TREATMENT: CPT

## 2022-08-03 PROCEDURE — 99232 SBSQ HOSP IP/OBS MODERATE 35: CPT | Performed by: CLINICAL NURSE SPECIALIST

## 2022-08-03 PROCEDURE — 2580000003 HC RX 258: Performed by: STUDENT IN AN ORGANIZED HEALTH CARE EDUCATION/TRAINING PROGRAM

## 2022-08-03 RX ORDER — OXYCODONE HYDROCHLORIDE 5 MG/1
5 TABLET ORAL EVERY 4 HOURS PRN
Status: DISCONTINUED | OUTPATIENT
Start: 2022-08-03 | End: 2022-08-03

## 2022-08-03 RX ORDER — ACETAMINOPHEN 325 MG/1
650 TABLET ORAL EVERY 4 HOURS PRN
Status: DISCONTINUED | OUTPATIENT
Start: 2022-08-03 | End: 2022-08-04 | Stop reason: HOSPADM

## 2022-08-03 RX ORDER — SUCRALFATE 1 G/1
1 TABLET ORAL EVERY 6 HOURS SCHEDULED
Status: DISCONTINUED | OUTPATIENT
Start: 2022-08-03 | End: 2022-08-04 | Stop reason: HOSPADM

## 2022-08-03 RX ORDER — OXYCODONE HYDROCHLORIDE 5 MG/1
5 TABLET ORAL EVERY 6 HOURS PRN
Status: DISCONTINUED | OUTPATIENT
Start: 2022-08-03 | End: 2022-08-03 | Stop reason: SDUPTHER

## 2022-08-03 RX ORDER — OXYCODONE HYDROCHLORIDE 5 MG/1
5 TABLET ORAL EVERY 4 HOURS PRN
Status: DISCONTINUED | OUTPATIENT
Start: 2022-08-03 | End: 2022-08-04 | Stop reason: HOSPADM

## 2022-08-03 RX ADMIN — OXYCODONE 5 MG: 5 TABLET ORAL at 15:47

## 2022-08-03 RX ADMIN — WATER 1000 MG: 1 INJECTION INTRAMUSCULAR; INTRAVENOUS; SUBCUTANEOUS at 13:27

## 2022-08-03 RX ADMIN — IPRATROPIUM BROMIDE AND ALBUTEROL SULFATE 1 AMPULE: .5; 2.5 SOLUTION RESPIRATORY (INHALATION) at 16:18

## 2022-08-03 RX ADMIN — APIXABAN 5 MG: 5 TABLET, FILM COATED ORAL at 21:55

## 2022-08-03 RX ADMIN — IPRATROPIUM BROMIDE AND ALBUTEROL SULFATE 1 AMPULE: .5; 2.5 SOLUTION RESPIRATORY (INHALATION) at 08:17

## 2022-08-03 RX ADMIN — Medication 5 MG: at 21:55

## 2022-08-03 RX ADMIN — ASPIRIN 81 MG CHEWABLE TABLET 81 MG: 81 TABLET CHEWABLE at 10:01

## 2022-08-03 RX ADMIN — ACETAMINOPHEN 650 MG: 325 TABLET ORAL at 03:43

## 2022-08-03 RX ADMIN — ARFORMOTEROL TARTRATE 15 MCG: 15 SOLUTION RESPIRATORY (INHALATION) at 08:16

## 2022-08-03 RX ADMIN — ARFORMOTEROL TARTRATE 15 MCG: 15 SOLUTION RESPIRATORY (INHALATION) at 20:01

## 2022-08-03 RX ADMIN — BUDESONIDE 500 MCG: 0.5 SUSPENSION RESPIRATORY (INHALATION) at 08:17

## 2022-08-03 RX ADMIN — IPRATROPIUM BROMIDE AND ALBUTEROL SULFATE 1 AMPULE: .5; 2.5 SOLUTION RESPIRATORY (INHALATION) at 20:00

## 2022-08-03 RX ADMIN — LORAZEPAM 1 MG: 1 TABLET ORAL at 15:47

## 2022-08-03 RX ADMIN — BUDESONIDE 500 MCG: 0.5 SUSPENSION RESPIRATORY (INHALATION) at 20:02

## 2022-08-03 RX ADMIN — ROSUVASTATIN 20 MG: 20 TABLET, FILM COATED ORAL at 21:55

## 2022-08-03 RX ADMIN — PANTOPRAZOLE SODIUM 40 MG: 40 TABLET, DELAYED RELEASE ORAL at 06:36

## 2022-08-03 RX ADMIN — ACETAMINOPHEN 650 MG: 325 TABLET ORAL at 06:32

## 2022-08-03 RX ADMIN — SUCRALFATE 1 G: 1 TABLET ORAL at 13:27

## 2022-08-03 RX ADMIN — OXYCODONE 5 MG: 5 TABLET ORAL at 21:55

## 2022-08-03 RX ADMIN — OXYCODONE 5 MG: 5 TABLET ORAL at 09:58

## 2022-08-03 RX ADMIN — APIXABAN 5 MG: 5 TABLET, FILM COATED ORAL at 09:59

## 2022-08-03 ASSESSMENT — PAIN DESCRIPTION - DESCRIPTORS
DESCRIPTORS: THROBBING
DESCRIPTORS: THROBBING;STABBING;SHOOTING
DESCRIPTORS: STABBING;THROBBING;SHOOTING

## 2022-08-03 ASSESSMENT — PAIN DESCRIPTION - LOCATION
LOCATION: GENERALIZED
LOCATION: GENERALIZED

## 2022-08-03 ASSESSMENT — PAIN DESCRIPTION - PAIN TYPE: TYPE: ACUTE PAIN

## 2022-08-03 ASSESSMENT — PAIN SCALES - GENERAL
PAINLEVEL_OUTOF10: 10
PAINLEVEL_OUTOF10: 9
PAINLEVEL_OUTOF10: 10
PAINLEVEL_OUTOF10: 8

## 2022-08-03 ASSESSMENT — PAIN DESCRIPTION - FREQUENCY: FREQUENCY: CONTINUOUS

## 2022-08-03 ASSESSMENT — PAIN DESCRIPTION - ONSET: ONSET: ON-GOING

## 2022-08-03 ASSESSMENT — PAIN - FUNCTIONAL ASSESSMENT: PAIN_FUNCTIONAL_ASSESSMENT: PREVENTS OR INTERFERES SOME ACTIVE ACTIVITIES AND ADLS

## 2022-08-03 NOTE — PROGRESS NOTES
Date: 8/2/2022    Time: 11:21 PM    Patient Placed On BIPAP/CPAP/ Non-Invasive Ventilation? Yes    If no must comment. Facial area red/color change? No           If YES are Blister/Lesion present? No   If yes must notify nursing staff  BIPAP/CPAP skin barrier?   Yes    Skin barrier type:mepilexlite       Comments:        Dayday Haddad RCP

## 2022-08-03 NOTE — PROGRESS NOTES
Palliative care LSW met at bedside with pt to discuss goals of care as she had expressed to care coordinator that she no longer wants treatment. Pt is alert and oriented x 4. She tells me her children do not know that her cancer has advanced, she found this out before having her stroke. She linares snot want LSW to call her children to discuss this and will do so herself this evening, or tomorrow. Further she states she spoke with Pio Fairly about assistance at her daughters home. Staff has no knowledge of any outside agency speaking with pt today. She did meet with [facility].  LSW will follow up with pt in am .

## 2022-08-03 NOTE — PROGRESS NOTES
Pt is requesting something for pain other than tylenol. Messaged Dr Billie Bowen regarding this. Awaiting response.

## 2022-08-03 NOTE — PLAN OF CARE
Problem: Chronic Conditions and Co-morbidities  Goal: Patient's chronic conditions and co-morbidity symptoms are monitored and maintained or improved  8/2/2022 2231 by Carolynn Medina RN  Outcome: Progressing  8/2/2022 1516 by Doug Ly RN  Outcome: Progressing     Problem: Discharge Planning  Goal: Discharge to home or other facility with appropriate resources  8/2/2022 2231 by Carolynn Medina RN  Outcome: Progressing  8/2/2022 1516 by Doug Ly RN  Outcome: Progressing     Problem: Confusion  Goal: Confusion, delirium, dementia, or psychosis is improved or at baseline  8/2/2022 2231 by Carolynn Medina RN  Outcome: Progressing  8/2/2022 1516 by Doug Ly RN  Outcome: Progressing     Problem: Skin/Tissue Integrity  Goal: Absence of new skin breakdown  8/2/2022 2231 by Carolynn Medina RN  Outcome: Progressing  8/2/2022 1516 by Doug Ly RN  Outcome: Progressing     Problem: Safety - Adult  Goal: Free from fall injury  8/2/2022 2231 by Carolynn Medina RN  Outcome: Progressing  8/2/2022 1516 by Doug Ly RN  Outcome: Progressing     Problem: Pain  Goal: Verbalizes/displays adequate comfort level or baseline comfort level  8/2/2022 2231 by Carolynn Medina RN  Outcome: Progressing  8/2/2022 1516 by Doug Ly RN  Outcome: Progressing     Problem: Cardiovascular - Adult  Goal: Maintains optimal cardiac output and hemodynamic stability  8/2/2022 2231 by Carolynn Medina RN  Outcome: Progressing  8/2/2022 1516 by Doug Ly RN  Outcome: Progressing     Problem: Metabolic/Fluid and Electrolytes - Adult  Goal: Hemodynamic stability and optimal renal function maintained  8/2/2022 2231 by Carolynn Medina RN  Outcome: Progressing  8/2/2022 1516 by Doug Ly RN  Outcome: Progressing     Problem: Nutrition Deficit:  Goal: Optimize nutritional status  8/2/2022 2231 by Carolynn Medina RN  Outcome: Progressing  8/2/2022 1516 by Doug Ly RN  Outcome: Progressing

## 2022-08-03 NOTE — CARE COORDINATION
8/3/22 Update CM note: Patient called CM to the room and stated she wishes to be kept comfortable and go home. She does not wish to go to acute rehab. She has planned on returning home to her daughter, Cathleen Sarabia, to live out her last days. She states she does not want any treatment and does not plan on coming back to the hospital. She is asking to talk with palliative care and hospice. She is requesting to return home by tomorrow if able. Palliative care and Dr Ghazala Izquierdo perfect serve sent to update on above conversation. Will await response.  Electronically signed by Michi Caro RN CM on 8/3/2022 at 12:54 PM

## 2022-08-03 NOTE — PROGRESS NOTES
met with patient in room and reviewed acute rehab with her. Explained that we will admit her once approval is back from insurance company. Voiced understanding.

## 2022-08-03 NOTE — PROGRESS NOTES
Hospitalist Progress Note      SYNOPSIS: Patient admitted on 7/25/2022     64-year-old female patient from home with medical history of severe pulmonary hypertension, tobacco use, right breast cancer, COPD, MDD, hypertension who was brought to the ED with concern for stroke. Patient last known normal well was around midnight last night. Family found her today around noon with left-sided weakness and facial droop. Upon EMS arrival at the scene patient had saturation of 56% on room air and placed on nonrebreather mask. Patient was recently diagnosed with breast cancer 10 days ago for which she was discharged from this facility. No report of fever, cough, CP, abdominal pain, n/v.   Stroke alert was activated on arrival to ED. CT scanning of the head showed subacute infarction described as area of ischemia in the right parietal and left occipital lobes. No hemorrhagic, significant mild static, or midline shift. At CTA of the head and neck obtained with no major intracranial arterial stenosis but 70% stenosis of the right proximal ICA. Telestroke was consulted and patient was out of the window for tPA given completed infarct, also per neuro recommendation patient with likely hypercoagulable state this ICA daily managed medically, with DAPT. Patient was given aspirin on the ED  Remaining ED work-up showing normal white blood cells, hemoglobin, elevated hematocrit, potassium 6.4, bicarb 32, BUN 73, creatinine 2.1, ABG showing pH 7.23, PCO2 76.7, PO2 202, troponin 66, and proBNP 17,428. EKG sinus bradycardia 59, normal QT, no ST segment changes. CTA pulmonary with contrast with no evidence of PE.   Redemonstration of right breast mass with right axillary lymphadenopathy and multiple enlarged mediastinal lymph nodes  Patient was admitted for further management of subacute stroke, acute hypercapnic and hypoxic respiratory failure     SUBJECTIVE:    Patient seen and examined at bedside, complained earlier of abdominal pain, better now after received medication  Denies other concerns  This is communication that patient now is refusing to go to acute rehab and use 1 to have palliative care and hospice care evaluation and, be discharged home and does not want to pursue any oncological treatment for her breast cancer     records reviewed. Stable overnight. No other overnight issues reported. Temp (24hrs), Av °F (36.7 °C), Min:97.6 °F (36.4 °C), Max:98.4 °F (36.9 °C)    DIET: ADULT DIET; Regular; Low Sodium (2 gm)  ADULT ORAL NUTRITION SUPPLEMENT; Breakfast, AM Snack, Dinner, PM Snack, Lunch, HS Snack; Standard High Calorie/High Protein Oral Supplement  CODE: Prior    Intake/Output Summary (Last 24 hours) at 8/3/2022 1332  Last data filed at 8/3/2022 0937  Gross per 24 hour   Intake 360 ml   Output --   Net 360 ml         OBJECTIVE:    BP (!) 108/53   Pulse 64   Temp 97.7 °F (36.5 °C) (Temporal)   Resp 16   Ht 5' 7\" (1.702 m)   Wt 107 lb 8 oz (48.8 kg)   SpO2 100%   BMI 16.84 kg/m²     General appearance: No apparent distress, appears stated age and cooperative. HEENT: Normal cephalic, atraumatic without obvious deformity. Pupils equal, round, and reactive to light. Extra ocular muscles intact. Conjunctivae/corneas clear. Neck: Supple, with full range of motion. No jugular venous distention. Trachea midline. Respiratory: Creased respiratory effort, on BiPAP, expiratory wheezing, no crackles  Cardiovascular: Bradycardic, no murmur gallops or rubs  Abdomen: Nondistended, normal BS, nontender, no visceromegaly  Musculoskeletal: No clubbing, cyanosis, right foot erythema and edema. Brisk capillary refill. Skin: Normal skin color. No rashes or lesions. Neurologic:  Neurovascularly intact without any focal sensory/motor deficits.  Cranial nerves: II-XII intact, grossly non-focal.  Left-sided hemiparesis and left-sided lower facial droop  Psychiatric: Alert and oriented, thought content appropriate, demonstrated cholelithiasis with no sonographic signs of cholecystitis. Lipase is normal       PLAN:    -Breathing treatments due neb scheduled. Completed short course steroids  -BiPAP QHS and daytime as needed   -Continue aspirin. Plavix discontinued as patient was started on Eliquis for hypercoagulable state  -Oncology following and plan to go for outpatient treatment  -ceftriaxone for UTI  -Continue PPI  -Proper equipment for home has been ordered. However family has changed their mind and patient now to go to subacute rehab.    -Palliative care hospice evaluation    DISPOSITION: Planning for going home. Awaiting palliative and hospice evaluation    Medications:  REVIEWED DAILY    Infusion Medications    dextrose      dextrose       Scheduled Medications    sucralfate  1 g Oral 4 times per day    mineral oil  1 enema Rectal Once    Followed by    bisacodyl  10 mg Rectal Once    pantoprazole  40 mg Oral QAM AC    cefTRIAXone (ROCEPHIN) IV  1,000 mg IntraVENous Q24H    aspirin  81 mg Oral Daily    apixaban  5 mg Oral BID    budesonide  500 mcg Nebulization BID    Arformoterol Tartrate  15 mcg Nebulization BID    melatonin  5 mg Oral Nightly    ipratropium-albuterol  1 ampule Inhalation Q4H WA    rosuvastatin  20 mg Oral Nightly     PRN Meds: acetaminophen, oxyCODONE, polyethylene glycol, bisacodyl, aluminum & magnesium hydroxide-simethicone, LORazepam, glucose, dextrose bolus **OR** dextrose bolus, glucagon (rDNA), dextrose, glucose, dextrose bolus **OR** dextrose bolus, glucagon (rDNA), dextrose, [COMPLETED] insulin regular **AND** [COMPLETED] dextrose **AND** POCT Glucose **AND** POCT Glucose **AND** dextrose    Labs:     Recent Labs     08/01/22  0447 08/01/22  1251 08/02/22  1131   WBC 8.3 7.7 6.3   HGB 9.6* 9.5* 9.0*   HCT 30.5* 30.2* 29.3*   PLT 93* 95* 108*       No results for input(s): NA, K, CL, CO2, BUN, CREATININE, CALCIUM, PHOS in the last 72 hours.     Invalid input(s): Tanya0 Fiorella Saenze N 08/01/22  0447 08/02/22  1131   PROT 4.3*  --    ALKPHOS 54  --    ALT 61*  --    AST 30  --    BILITOT 0.7  --    LIPASE  --  32         No results for input(s): INR in the last 72 hours. No results for input(s): [facility] in the last 72 hours. Chronic labs:    Lab Results   Component Value Date    CHOL 170 07/26/2022    TRIG 121 07/26/2022    HDL 36 07/26/2022    LDLCALC 110 (H) 07/26/2022    TSH 2.510 07/14/2022    INR 2.0 07/25/2022    LABA1C 5.8 (H) 07/26/2022       Radiology: REVIEWED DAILY    +++++++++++++++++++++++++++++++++++++++++++++++++  Elena Fulton MD  [employer] - [address], [place]  +++++++++++++++++++++++++++++++++++++++++++++++++  NOTE: This report was transcribed using voice recognition software. Every effort was made to ensure accuracy; however, inadvertent computerized transcription errors may be present.

## 2022-08-03 NOTE — CARE COORDINATION
8/3/22 Update CM Note; Patient now on general medical floor. She is pending precert with [facility] and it was started yesterday. She is continued on iv rocephin qd. Per notes [employer] has been following and prior auth started for NIV for home and [address] for hospital bed/bsc and wheelchair for home. Patient is currently planning on ARU at [facility] if approval is obtained by insurance. She will require a covid test prior to ARU. Will follow.  Electronically signed by Armida Pedraza RN CM on 8/3/2022 at 10:37 AM

## 2022-08-03 NOTE — PROGRESS NOTES
Consult received chart reviewed. Met with patient at bedside no family members present. Patient is very concerned that none of her medical information is shared with her family. Discussed Hospice services and philosophy. All questions answered. Patient states that she is unsure if she is ready for Hospice, because she is not \"dying tomorrow\". This nurse explained that Hospice would be appropriate due to the fact that she is refusing all aggressive treatment. Patient states that she is having a meeting with her family today. HOTV will follow up tomorrow. CM updated on conversation.

## 2022-08-03 NOTE — PROGRESS NOTES
Palliative Care Department  [phone]  Palliative Care Progress Note  Provider Blaise Severino APRN - CNS      PATIENT: Paz Lam  : 1955  MRN: [id number]  ADMISSION DATE: 2022  1:54 PM  Referring Provider: Miguel Gastelum MD    Palliative Medicine was consulted on hospital day 9 for assistance with Goals of care, Code Status Discussion. HPI:     Aba Hernandez is a 78 y.o. y/o female with a history of pulmonary hypertension, tobacco use, right breast cancer, COPD, hypertension who presented to [facility] on 2022 with concern for stroke. Per family, patient was experiencing strokelike symptoms, upon EMS arrival patient was found to be hypoxic with oxygen saturation of 56% on room air, she was placed under nonrebreather mask, currently on following nasal cannula. CT scan shows subacute infarction with area of ischemia in the right parietal and left occipital lobes. CT of the head and neck shows 70% stenosis of the right proximal ICA. Patient was out of the window for tPA, aspirin was given in the emergency room. Oncology, pulmonology, and neurology consulted. Palliative medicine consulted to discuss goal of care and CODE STATUS discussion. ASSESSMENT/PLAN:     Pertinent Hospital Diagnoses     Breast CA; Biopsy positive for invasive poorly differentiated carcinoma with squamous differentiation  CVA    Palliative Care Encounter / Counseling Regarding Goals of Care  Please see detailed goals of care discussion as below  At this time, Paz Lam, Does Not have capacity for medical decision-making.  Capacity is time limited and situation/question specific  During encounter no one was surrogate medical decision-maker  Outcome of goals of care meeting:  Await MRI brain  Awaiting call back from son  Continue current medical management   Code status Full Code  Advanced Directives: no POA or living will in [facility]  Surrogate/Legal NOK:  Lazaro Tran (Child) [address] (Child) [phone]     Spiritual assessment: no spiritual distress identified  Bereavement and grief: to be determined  Referrals to: none today      SUBJECTIVE:     Details of Conversation:   8/3/22 Contacted by staff-stating pt is crying and in pain. Had oxycodone 5 mg q 6hrs; changed frequency to oxycodone 5 mg q 4 hrs prn for mod/severe pain. States pain in right side of chest and under arm 10/10. States oxycodone helps and brings pain down to 2/10 but 6 hrs is too long of a time to wait. Very anxious and crying. Requested pt be medicated with both oxycodone and ativan at this time. Has oxygen on 2L per n/c; SPO2 100%. No family members currently at bedside. Pt considering hospice and is requesting that no information be shared with her children. She is refusing all aggressive treatment at this time. Pt plans to speak with her children herself. Chart reviewed. Patient seen sitting up in wheelchair. Alert and confused. Call placed to son, Tracy Dinh, with no answer. Left message and awaiting a callback. Will follow for ongoing goals of care and CODE STATUS discussions as well as support for the patient and family.     Prognosis: Guarded    OBJECTIVE:     BP (!) 108/53   Pulse 64   Temp 97.7 °F (36.5 °C) (Temporal)   Resp 16   Ht 5' 7\" (1.702 m)   Wt 107 lb 8 oz (48.8 kg)   SpO2 100%   BMI 16.84 kg/m²     Physical Examination: Per chart reviewed    Gen: awake, alert, seems oriented but some confusion per staff  HEENT: normocephalic, atraumatic, PERRL, EOMI,   Lungs: Expiratory wheezes, on BiPAP   heart: regular rate and rhythm, distant heart tones,   Abdomen: normoactive bowel sounds, soft, non-tender  Extremities: Right foot erythema and edema   skin: warm, dry without rashes, lesions, bruising  Neuro: Left-sided hemiparesis and left-sided lower facial droop    Objective data reviewed: labs, images, records, medication use, vitals, and chart    Time/Communication  Greater than 50% of time spent, total 25 minutes in counseling and coordination of care at the bedside regarding  CODE STATUS discussion and goals of care. Thank you for allowing Palliative Medicine to participate in the care of Sebastien Villareal.

## 2022-08-04 VITALS
RESPIRATION RATE: 18 BRPM | HEIGHT: 67 IN | HEART RATE: 69 BPM | OXYGEN SATURATION: 100 % | WEIGHT: 104.6 LBS | SYSTOLIC BLOOD PRESSURE: 120 MMHG | TEMPERATURE: 98.8 F | DIASTOLIC BLOOD PRESSURE: 59 MMHG | BODY MASS INDEX: 16.42 KG/M2

## 2022-08-04 LAB
BASOPHILS ABSOLUTE: 0 E9/L (ref 0–0.2)
BASOPHILS RELATIVE PERCENT: 0 % (ref 0–2)
EOSINOPHILS ABSOLUTE: 0.13 E9/L (ref 0.05–0.5)
EOSINOPHILS RELATIVE PERCENT: 2.4 % (ref 0–6)
HCT VFR BLD CALC: 28.1 % (ref 34–48)
HEMOGLOBIN: 8.5 G/DL (ref 11.5–15.5)
IMMATURE GRANULOCYTES #: 0.01 E9/L
IMMATURE GRANULOCYTES %: 0.2 % (ref 0–5)
LYMPHOCYTES ABSOLUTE: 1.59 E9/L (ref 1.5–4)
LYMPHOCYTES RELATIVE PERCENT: 29.1 % (ref 20–42)
MCH RBC QN AUTO: 29.2 PG (ref 26–35)
MCHC RBC AUTO-ENTMCNC: 30.2 % (ref 32–34.5)
MCV RBC AUTO: 96.6 FL (ref 80–99.9)
MONOCYTES ABSOLUTE: 0.75 E9/L (ref 0.1–0.95)
MONOCYTES RELATIVE PERCENT: 13.7 % (ref 2–12)
NEUTROPHILS ABSOLUTE: 2.99 E9/L (ref 1.8–7.3)
NEUTROPHILS RELATIVE PERCENT: 54.6 % (ref 43–80)
PDW BLD-RTO: 14.6 FL (ref 11.5–15)
PLATELET # BLD: 141 E9/L (ref 130–450)
PMV BLD AUTO: 10.2 FL (ref 7–12)
RBC # BLD: 2.91 E12/L (ref 3.5–5.5)
WBC # BLD: 5.5 E9/L (ref 4.5–11.5)

## 2022-08-04 PROCEDURE — 6370000000 HC RX 637 (ALT 250 FOR IP): Performed by: CLINICAL NURSE SPECIALIST

## 2022-08-04 PROCEDURE — 94660 CPAP INITIATION&MGMT: CPT

## 2022-08-04 PROCEDURE — 6370000000 HC RX 637 (ALT 250 FOR IP): Performed by: PHYSICIAN ASSISTANT

## 2022-08-04 PROCEDURE — 6360000002 HC RX W HCPCS

## 2022-08-04 PROCEDURE — 6370000000 HC RX 637 (ALT 250 FOR IP): Performed by: STUDENT IN AN ORGANIZED HEALTH CARE EDUCATION/TRAINING PROGRAM

## 2022-08-04 PROCEDURE — 97129 THER IVNTJ 1ST 15 MIN: CPT

## 2022-08-04 PROCEDURE — 85025 COMPLETE CBC W/AUTO DIFF WBC: CPT

## 2022-08-04 PROCEDURE — 36415 COLL VENOUS BLD VENIPUNCTURE: CPT

## 2022-08-04 PROCEDURE — 6370000000 HC RX 637 (ALT 250 FOR IP): Performed by: NURSE PRACTITIONER

## 2022-08-04 PROCEDURE — 6370000000 HC RX 637 (ALT 250 FOR IP): Performed by: INTERNAL MEDICINE

## 2022-08-04 PROCEDURE — 6360000002 HC RX W HCPCS: Performed by: STUDENT IN AN ORGANIZED HEALTH CARE EDUCATION/TRAINING PROGRAM

## 2022-08-04 PROCEDURE — 97130 THER IVNTJ EA ADDL 15 MIN: CPT

## 2022-08-04 PROCEDURE — 2580000003 HC RX 258: Performed by: STUDENT IN AN ORGANIZED HEALTH CARE EDUCATION/TRAINING PROGRAM

## 2022-08-04 PROCEDURE — 2700000000 HC OXYGEN THERAPY PER DAY

## 2022-08-04 PROCEDURE — 94640 AIRWAY INHALATION TREATMENT: CPT

## 2022-08-04 RX ORDER — GLYCOPYRROLATE 1 MG/1
1 TABLET ORAL 3 TIMES DAILY PRN
Qty: 9 TABLET | Refills: 0 | Status: SHIPPED | OUTPATIENT
Start: 2022-08-04 | End: 2022-08-07

## 2022-08-04 RX ORDER — MAGNESIUM HYDROXIDE/ALUMINUM HYDROXICE/SIMETHICONE 120; 1200; 1200 MG/30ML; MG/30ML; MG/30ML
30 SUSPENSION ORAL EVERY 6 HOURS PRN
Qty: 355 ML | Refills: 0 | Status: SHIPPED | OUTPATIENT
Start: 2022-08-04 | End: 2022-09-03

## 2022-08-04 RX ORDER — PANTOPRAZOLE SODIUM 40 MG/1
40 TABLET, DELAYED RELEASE ORAL
Qty: 30 TABLET | Refills: 0 | Status: SHIPPED | OUTPATIENT
Start: 2022-08-05

## 2022-08-04 RX ORDER — ASPIRIN 81 MG/1
81 TABLET, CHEWABLE ORAL DAILY
Qty: 30 TABLET | Refills: 0 | Status: SHIPPED | OUTPATIENT
Start: 2022-08-05

## 2022-08-04 RX ORDER — OXYCODONE HYDROCHLORIDE 5 MG/1
5 TABLET ORAL EVERY 4 HOURS PRN
Qty: 18 TABLET | Refills: 0 | Status: SHIPPED | OUTPATIENT
Start: 2022-08-04 | End: 2022-08-05 | Stop reason: SDUPTHER

## 2022-08-04 RX ORDER — POLYETHYLENE GLYCOL 3350 17 G/17G
17 POWDER, FOR SOLUTION ORAL DAILY PRN
Qty: 527 G | Refills: 0 | Status: SHIPPED | OUTPATIENT
Start: 2022-08-04 | End: 2022-09-03

## 2022-08-04 RX ORDER — MIRTAZAPINE 7.5 MG/1
7.5 TABLET, FILM COATED ORAL NIGHTLY
Qty: 10 TABLET | Refills: 0 | Status: SHIPPED | OUTPATIENT
Start: 2022-08-04 | End: 2022-08-14

## 2022-08-04 RX ORDER — DRONABINOL 2.5 MG/1
2.5 CAPSULE ORAL
Qty: 30 CAPSULE | Refills: 0 | Status: SHIPPED | OUTPATIENT
Start: 2022-08-04 | End: 2022-08-19

## 2022-08-04 RX ORDER — LORAZEPAM 1 MG/1
1 TABLET ORAL EVERY 4 HOURS PRN
Qty: 18 TABLET | Refills: 0 | Status: SHIPPED | OUTPATIENT
Start: 2022-08-04 | End: 2022-08-05 | Stop reason: SDUPTHER

## 2022-08-04 RX ADMIN — SUCRALFATE 1 G: 1 TABLET ORAL at 06:26

## 2022-08-04 RX ADMIN — SUCRALFATE 1 G: 1 TABLET ORAL at 12:36

## 2022-08-04 RX ADMIN — IPRATROPIUM BROMIDE AND ALBUTEROL SULFATE 1 AMPULE: .5; 2.5 SOLUTION RESPIRATORY (INHALATION) at 12:26

## 2022-08-04 RX ADMIN — IPRATROPIUM BROMIDE AND ALBUTEROL SULFATE 1 AMPULE: .5; 2.5 SOLUTION RESPIRATORY (INHALATION) at 08:41

## 2022-08-04 RX ADMIN — OXYCODONE 5 MG: 5 TABLET ORAL at 15:00

## 2022-08-04 RX ADMIN — LORAZEPAM 1 MG: 1 TABLET ORAL at 08:32

## 2022-08-04 RX ADMIN — OXYCODONE 5 MG: 5 TABLET ORAL at 02:03

## 2022-08-04 RX ADMIN — APIXABAN 5 MG: 5 TABLET, FILM COATED ORAL at 08:33

## 2022-08-04 RX ADMIN — SUCRALFATE 1 G: 1 TABLET ORAL at 01:41

## 2022-08-04 RX ADMIN — PANTOPRAZOLE SODIUM 40 MG: 40 TABLET, DELAYED RELEASE ORAL at 06:26

## 2022-08-04 RX ADMIN — WATER 1000 MG: 1 INJECTION INTRAMUSCULAR; INTRAVENOUS; SUBCUTANEOUS at 12:36

## 2022-08-04 RX ADMIN — ARFORMOTEROL TARTRATE 15 MCG: 15 SOLUTION RESPIRATORY (INHALATION) at 08:41

## 2022-08-04 RX ADMIN — OXYCODONE 5 MG: 5 TABLET ORAL at 08:32

## 2022-08-04 RX ADMIN — BUDESONIDE 500 MCG: 0.5 SUSPENSION RESPIRATORY (INHALATION) at 08:41

## 2022-08-04 RX ADMIN — ASPIRIN 81 MG CHEWABLE TABLET 81 MG: 81 TABLET CHEWABLE at 08:32

## 2022-08-04 ASSESSMENT — PAIN SCALES - GENERAL
PAINLEVEL_OUTOF10: 7
PAINLEVEL_OUTOF10: 8
PAINLEVEL_OUTOF10: 9

## 2022-08-04 ASSESSMENT — PAIN DESCRIPTION - FREQUENCY: FREQUENCY: CONTINUOUS

## 2022-08-04 ASSESSMENT — PAIN DESCRIPTION - LOCATION
LOCATION: GENERALIZED
LOCATION: GENERALIZED

## 2022-08-04 ASSESSMENT — PAIN DESCRIPTION - ONSET: ONSET: ON-GOING

## 2022-08-04 ASSESSMENT — PAIN DESCRIPTION - DESCRIPTORS: DESCRIPTORS: SHARP;DISCOMFORT;ACHING

## 2022-08-04 ASSESSMENT — PAIN DESCRIPTION - ORIENTATION: ORIENTATION: RIGHT;LEFT;MID

## 2022-08-04 ASSESSMENT — PAIN - FUNCTIONAL ASSESSMENT: PAIN_FUNCTIONAL_ASSESSMENT: PREVENTS OR INTERFERES SOME ACTIVE ACTIVITIES AND ADLS

## 2022-08-04 ASSESSMENT — PAIN DESCRIPTION - PAIN TYPE: TYPE: ACUTE PAIN

## 2022-08-04 NOTE — DISCHARGE SUMMARY
Hospitalist Discharge Summary    Patient ID: Clover Velasquez   Patient : 1955  Patient's PCP: No primary care provider on file. Admit Date: 2022   Admitting Physician: Adolfo Chrsitianson MD    Discharge Date:  2022   Discharge Physician: Louisa Goodman MD   Discharge Condition: Stable  Discharge Disposition: [facility] course in brief:  (Please refer to daily progress notes for a comprehensive review of the hospitalization by requesting medical records)  73-year-old female patient from home who was brought to the ED with concern for stroke. Patient last known normal well was around midnight last night. Family found her today around noon with left-sided weakness and facial droop. Upon EMS arrival at the scene patient had saturation of 56% on room air and placed on nonrebreather mask. Patient was recently diagnosed with breast cancer 10 days ago for which she was discharged from this facility. No report of fever, cough, CP, abdominal pain, n/v.   Stroke alert was activated on arrival to ED. CT scanning of the head showed subacute infarction described as area of ischemia in the right parietal and left occipital lobes. No hemorrhagic, significant mild static, or midline shift. At CTA of the head and neck obtained with no major intracranial arterial stenosis but 70% stenosis of the right proximal ICA. Telestroke was consulted and patient was out of the window for tPA given completed infarct, also per neuro recommendation patient with likely hypercoagulable state this ICA daily managed medically, with DAPT. Patient was given aspirin on the ED  Remaining ED work-up showing normal white blood cells, hemoglobin, elevated hematocrit, potassium 6.4, bicarb 32, BUN 73, creatinine 2.1, ABG showing pH 7.23, PCO2 76.7, PO2 202, troponin 66, and proBNP 17,428. EKG sinus bradycardia 59, normal QT, no ST segment changes.   CTA pulmonary with contrast with no evidence of PE. Redemonstration of right breast mass with right axillary lymphadenopathy and multiple enlarged mediastinal lymph nodes. Patient was admitted for further management of subacute stroke, acute hypercapnic and hypoxic respiratory failure. Acute ischemic stroke. MRI 7/29: Acute or early subacute infarctions in the right frontal lobe and the left occipital lobe. Patient is already on aspirin and Plavix. Neurology consulted from admission and recommendations appreciated. As patient has multiple location of anterior and posterior stroke embolic source is suspected. Neurology recommending AUDREY if oncology does not feel that this is a result of a hypercoagulable state, otherwise neurology recommended just to start oral anticoagulants and no further work-up indicated. Patient to be started on Eliquis. Therefore Plavix will be discontinued     Acute hypoxic and hypercapnic respiratory failure. With a history of COPD. Mild exacerbation on admission. Received [place] of the steroids and DuoNeb's. Resolved  Hyper kalemia in the course of DIANN and dehydration. Resolved  Acute respiratory acidosis  DIANN  Elevated troponin in the setting of acute respiratory failure and a stroke. -unlikely ACS, likely demand ischemia  Recent diagnosis of right breast cancer. Status post ultrasound-guided core biopsy of the right axillary lymphadenopathy. 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 Oncology following. Additional tissue diagnosis  Severe pulmonary hypertension  History of tobacco use  History of MDD/and anxiety  Hypertension  Right foot edema. Right lower extremity venous Doppler negative for DVT. A right lower extremity arterial Doppler normal  Sinus bradycardia ( Hypotension and bradycardia.   Cosyntropin stimulation test showed normal cortisol but obtained throughout steroids on ED  Deconditioning, impaired gait. Patient needs wheelchair for safety transportation. A cane or a walker are not safe tools at this moment. Increased risk for falls  -Abdominal pain. Right upper quadrant ultrasound demonstrated cholelithiasis with no sonographic signs of cholecystitis. Lipase is normal     Patient decided to stop all medical treatment and further testing. Patient wishes not to pursue cancer treatment and requested hospice. Patient was evaluated by the hospice qualified for this service. Comfort medications were sent to the pharmacy. Patient seen and evaluated at bedside, she is discharge home in stable condition    Physical exam    General appearance: No apparent distress, appears stated age and cooperative. HEENT: Normal cephalic, atraumatic without obvious deformity. Pupils equal, round, and reactive to light. Extra ocular muscles intact. Conjunctivae/corneas clear. Neck: Supple, with full range of motion. No jugular venous distention. Trachea midline. Respiratory: Creased respiratory effort, on BiPAP, expiratory wheezing, no crackles  Cardiovascular:  no murmur gallops or rubs  Abdomen: Nondistended, normal BS, nontender, no visceromegaly  Musculoskeletal: No clubbing, cyanosis, right foot erythema and edema. Brisk capillary refill. Skin: Normal skin color. No rashes or lesions. Neurologic:  Neurovascularly intact without any focal sensory/motor deficits.  Cranial nerves: II-XII intact, grossly non-focal.  Left-sided hemiparesis and left-sided lower facial droop  Psychiatric: Alert and oriented, thought content appropriate, normal insight     Consults:   IP CONSULT TO INTERNAL MEDICINE  IP CONSULT TO NEUROLOGY  IP CONSULT TO ONCOLOGY  IP CONSULT TO PULMONOLOGY  IP CONSULT TO PALLIATIVE CARE  TEST MOCK CON71  IP CONSULT TO PHYSICAL MEDICINE REHAB  IP CONSULT TO HOSPICE  IP CONSULT TO PALLIATIVE CARE    Discharge Diagnoses:        Discharge Instructions / Follow up:    Future Appointments   Date Time Provider Erum Salinas   8/8/2022 10:30 AM [facility] ROOM 1 [facility] [place]   8/17/2022  2:30 PM ANGELITA Summers CNP [facility]       Continued appropriate risk factor modification of blood pressure, diabetes and serum lipids will remain essential to reducing risk of future atherosclerotic development    Activity: activity as tolerated    Significant labs:  CBC:   Recent Labs     08/02/22  1131 08/04/22  0629   WBC 6.3 5.5   RBC 3.04* 2.91*   HGB 9.0* 8.5*   HCT 29.3* 28.1*   MCV 96.4 96.6   RDW 14.7 14.6   * 141     BMP: No results for input(s): NA, K, CL, CO2, BUN, CREATININE, CA, MG, PHOS in the last 72 hours. LFT:  Recent Labs     08/02/22  1131   LIPASE 32     PT/INR: No results for input(s): INR, APTT in the last 72 hours. BNP: No results for input(s): BNP in the last 72 hours.   Hgb A1C:   Lab Results   Component Value Date    LABA1C 5.8 (H) 07/26/2022     Folate and B12: No results found for: KNSTQTQR64, No results found for: FOLATE  Thyroid Studies:   Lab Results   Component Value Date    TSH 2.510 07/14/2022       Urinalysis:    Lab Results   Component Value Date/Time    NITRU Negative 07/25/2022 05:06 PM    WBCUA 2-5 07/25/2022 05:06 PM    BACTERIA MODERATE 07/25/2022 05:06 PM    RBCUA NONE 07/25/2022 05:06 PM    BLOODU Negative 07/25/2022 05:06 PM    SPECGRAV 1.015 07/25/2022 05:06 PM    GLUCOSEU Negative 07/25/2022 05:06 PM       Imaging:  Echo Complete    Result Date: 7/13/2022  Transthoracic Echocardiography Report (TTE)  Demographics   Patient Name        Valentín Muniz Gender                Female   Medical Record      [id number]     Room Number           21  Number   Account #           [de-identified]    Procedure Date        07/13/2022   Corporate ID                     Ordering Physician    Makayla Cruz MD   Accession Number    [id number]   Referring Physician   Date of Birth       1955   Sonographer           Leartis Giraldo RDCS   Age                 78 year(s) Interpreting          David Way MD                                   Physician                                    Any Other  Procedure Type of Study   TTE procedure:Echo Complete W/Doppler & Color Flow. Procedure Date Date: 07/13/2022 Start: 03:50 PM Study Location: Portable Technical Quality: Adequate visualization Indications:Preop cardiac evaluation. Patient Status: Routine Height: 67 inches Weight: 115 pounds BSA: 1.6 m^2 BMI: 18.01 kg/m^2 HR: 83 bpm BP: 172/93 mmHg  Findings   Left Ventricle  Left ventricle size is normal.  Normal left ventricular wall thickness. Ejection fraction is visually estimated at 50%. Indeterminate diastolic function. Right Ventricle  Dilated right ventricle and reduced right ventricular function (TAPSE 1.0  cm). Left Atrium  Normal sized left atrium by volume index. Right Atrium  Severely dilated right atrium. Mitral Valve  Mild mitral regurgitation. No evidence of hemodynamically significant mitral stenosis. Tricuspid Valve  Moderate tricuspid regurgitation. RV-RA gradient is estimated at 58 mmHg. Aortic Valve  No evidence of hemodynamically significant aortic stenosis. Mild aortic regurgitation. Pulmonic Valve  Mild pulmonic regurgitation. Pericardial Effusion  No evidence of a hemodynamically significant pericardial effusion. Aorta  Aortic root dimension within normal limits. Conclusions   Summary  Ejection fraction is visually estimated at 50%. Dilated right ventricle and reduced right ventricular function (TAPSE 1.0  cm). Indeterminate diastolic function. Severely dilated right atrium. Mild mitral regurgitation. Mild aortic regurgitation. Moderate tricuspid regurgitation. RV-RA gradient is estimated at 58 mmHg.    Signature   ----------------------------------------------------------------  Electronically signed by David Way MD(Interpreting  physician) on 07/13/2022 05:48 PM  ---------------------------------------------------------------- M-Mode/2D Measurements & Calculations   LV Diastolic       LV Systolic       AV Cusp Separation: 1.7 cmLA  Dimension: 4.2 cm  Dimension: 3 cm   Dimension: 3.7 cmAO Root Dimension: 3  LV FS:28.6 %                         cm  LV PW Diastolic:  0.8 cm             LV Mass Index: 63  Septum Diastolic:  l/min*m^2  0.8 cm                               RV Diastolic Dimension: 3.1 cm  CO: 3.55 l/min  CI: 2.22 l/m*m^2   LVOT: 1.9 cm      Ascending Aorta: 1.8 cm  LV Mass: 101.29 g                    LA volume/Index: 50.2 ml /31.35ml/m^2                                       RA Area: 19.7 cm^2  Doppler Measurements & Calculations   MV Peak E-Wave: 0.7  AV Peak Velocity: 1.39 LVOT Peak Velocity: 0.82 m/s  m/s                  m/s                    LVOT Mean Velocity: 0.49 m/s  MV Peak A-Wave: 0.55 AV Peak Gradient: 7.7  LVOT Peak Gradient: 2.7  m/s                  mmHg                   mmHgLVOT Mean Gradient: 1.1  MV E/A Ratio: 1.26   AV Mean Velocity: 0.83 mmHg  MV Peak Gradient:    m/s                    Estimated RVSP: 60.6 mmHg  4.2 mmHg             AV Mean Gradient: 3.3  Estimated RAP:3 mmHg  MV Mean Gradient:    mmHg  1.8 mmHg             AV VTI: 22.2 cm  MV Mean Velocity:    AV Area                TR Velocity:3.8 m/s  0.62 m/s             (Continuity):1.93 cm^2 TR Gradient:57.64 mmHg  MV Deceleration      AV Deceleration Time:  PV Peak Velocity: 0.75 m/s  Time: 112.8 msec     1460 msec              PV Peak Gradient: 2.24 mmHg  MV P1/2t: 40.8 msec  LVOT VTI: 15.1 cm      PV Mean Velocity: 0.55 m/s  MVA by PHT:5.39 cm^2                        PV Mean Gradient: 1.3 mmHg  MV Area              Estimated PASP: 60.64  (continuity): 2.4    mmHg                   NV ED Velocity: 1.65 m/s  cm^2  MV E' Septal  Velocity: 0.09 m/s  MV E' Lateral  Velocity: 10 m/s  [email or web address] [id number]    CT ABDOMEN PELVIS WO CONTRAST Additional Contrast? None    Result Date: 7/26/2022  EXAMINATION: CT OF THE ABDOMEN AND PELVIS WITHOUT CONTRAST 7/26/2022 9:07 am TECHNIQUE: CT of the abdomen and pelvis was performed without the administration of intravenous contrast. Multiplanar reformatted images are provided for review. Automated exposure control, iterative reconstruction, and/or weight based adjustment of the mA/kV was utilized to reduce the radiation dose to as low as reasonably achievable. COMPARISON: The previous study performed 07/15/2022. Correlation is made with CTA of the chest performed 07/25/2022. HISTORY: ORDERING SYSTEM PROVIDED HISTORY: reanl failure TECHNOLOGIST PROVIDED HISTORY: Reason for exam:->reanl failure Additional Contrast?->None Decision Support Exception - unselect if not a suspected or confirmed emergency medical condition->Emergency Medical Condition (MA) What reading provider will be dictating this exam?->CRC FINDINGS: Lower Chest: Ill-defined opacities are now noted in the left lower lobe, suspicious for pneumonia. Slightly less significant, but similar findings are noted involving the right lower lobe. This probably represents atelectasis. Cardiomegaly is again present. A partially seen right breast mass is noted, which is described in full detail on the report from the CTA of the chest. Organs: The liver, spleen, biliary tree, pancreas, and adrenal glands are unremarkable for a non IV contrast study. There is enhancement of the gallbladder, indicating vicarious excretion from the patient's CTA performed the day before. There is still relatively bright enhancement of the renal parenchyma bilaterally, indicating delayed excretion of injected contrast. There is contrast noted within the ureters and urinary bladder. The findings indicate mild renal failure. GI/Bowel: The stomach is grossly unremarkable for a non oral contrast study.  A mild amount of high density material is seen within a portion of the small bowel, as well as ascending colon. This could represent residual oral contrast from the prior CT, however, this could also represent the minutes stray shin of other materials, including medicines. Further evaluation of the small and large bowel is suboptimal without the administration of oral contrast.  No gross bowel abnormality is otherwise identified. Pelvis: The uterus and both adnexa are grossly unremarkable. The previously noted prominent bilateral pelvic veins are not as well visualized and is probably secondary to the lack of IV contrast administration. A Valencia catheter is identified within the urinary bladder. A small amount of air is noted within the urinary bladder, most likely as result of the placement of the Valencia catheter. No other gross urinary bladder abnormality is noted. Peritoneum/Retroperitoneum: No gross retroperitoneal or pelvic lymphadenopathy is identified. There has been the interval appearance of a small to moderate amount of abdominal and pelvic ascites since the CT scan of the abdomen and pelvis. A small amount of ascites was noted in the upper abdomen on the CTA performed yesterday. No gross abdominal or pelvic soft tissue mass is seen. The abdominal aorta is again atherosclerotic. Bones/Soft Tissues: The visualized osseous structures are without significant interval change. 1.  The overall study is slightly suboptimal without the administration of IV and oral contrast. 2.  Suspicion for left lower lobe pneumonia. 3.  Cardiomegaly again noted, when compared to the prior CT scan of the abdomen and pelvis performed 07/15/2022 and CTA of the chest performed 1 day previously. 4.  Partially seen right breast mass again noted. 5.  Vicarious excretion of IV contrast from the patient's CTA performed 07/25 into the gallbladder.  6.  Relatively bright enhancement of the renal parenchyma bilaterally, indicating delayed excretion of injected contrast from the CTA performed 1 day prior.  IV contrast is noted within the ureters and urinary bladder. The findings indicate mild renal failure. 7.  Small to moderate amount of abdominal and pelvic ascites. XR ABDOMEN (KUB) (SINGLE AP VIEW)    Result Date: 8/1/2022  EXAMINATION: ONE SUPINE XRAY VIEW(S) OF THE ABDOMEN 8/1/2022 2:06 pm COMPARISON: None. HISTORY: ORDERING SYSTEM PROVIDED HISTORY: abdominal pain bloating TECHNOLOGIST PROVIDED HISTORY: Reason for exam:->abdominal pain bloating What reading provider will be dictating this exam?->CRC FINDINGS: Nonspecific bowel gas pattern without evidence of obstruction. No abnormal calcifications. No acute osseous abnormality. Some high density stool is noted in the left colon and rectosigmoid colon. No evidence of intestinal obstruction or ileus. Mild degenerative changes in the mid lumbar spine. No free air. No evidence of mechanical obstruction or significant ileus. Mild amount of high density stool in the rectosigmoid colon. XR ABDOMEN (KUB) (SINGLE AP VIEW)    Result Date: 7/27/2022  EXAMINATION: ONE SUPINE XRAY VIEW(S) OF THE ABDOMEN 7/27/2022 7:19 am COMPARISON: Abdominopelvic CT 26 July 2022 HISTORY: ORDERING SYSTEM PROVIDED HISTORY: assess movement of constrast and evaluate for obstruction and stool burden TECHNOLOGIST PROVIDED HISTORY: Reason for exam:->assess movement of constrast and evaluate for obstruction and stool burden What reading provider will be dictating this exam?->CRC FINDINGS: No free air, bowel wall pneumatosis or dilated bowel. Very small amount of residual contrast material in the distal small bowel and right colon. No abnormal calcifications. No abnormal accumulation of fecal material in the lower GI tract. No active process. Very small amount of residual contrast in the distal small bowel and right colon.      CT Head WO Contrast    Result Date: 7/25/2022  EXAMINATION: CT OF THE HEAD WITHOUT CONTRAST  7/25/2022 2:12 pm TECHNIQUE: CT of the head was performed without the administration of intravenous contrast. Automated exposure control, iterative reconstruction, and/or weight based adjustment of the mA/kV was utilized to reduce the radiation dose to as low as reasonably achievable. COMPARISON: None. HISTORY: ORDERING SYSTEM PROVIDED HISTORY: matthew TECHNOLOGIST PROVIDED HISTORY: Reason for exam:->matthew Has a \"code stroke\" or \"stroke alert\" been called? ->Yes Decision Support Exception - unselect if not a suspected or confirmed emergency medical condition->Emergency Medical Condition (MA) FINDINGS: BRAIN/VENTRICLES: Hypodensity likely representing cytotoxic edema from a subacute infarction is seen in the right parietal lobe. Small hypodensity in the left occipital lobe may also represent a subacute infarction. No hemorrhage, significant mass effect or midline shift is seen. No hydrocephalus or extra-axial fluid is seen. ORBITS: The visualized portion of the orbits demonstrate no acute abnormality. SINUSES: The visualized paranasal sinuses and mastoid air cells demonstrate no acute abnormality. SOFT TISSUES/SKULL:  No acute abnormality of the visualized skull or soft tissues. 1. LIKELY SUBACUTE INFARCTIONS in the right parietal and left occipital lobes. 2. No hemorrhage, significant mass effect or midline shift. CT CHEST W CONTRAST    Result Date: 7/15/2022  EXAMINATION: CT OF THE ABDOMEN AND PELVIS WITH CONTRAST; CT OF THE CHEST WITH CONTRAST 7/15/2022 3:43 pm TECHNIQUE: CT of the abdomen and pelvis was performed with the administration of intravenous contrast. Multiplanar reformatted images are provided for review. Automated exposure control, iterative reconstruction, and/or weight based adjustment of the mA/kV was utilized to reduce the radiation dose to as low as reasonably achievable.; CT of the chest was performed with the administration of intravenous contrast. Multiplanar reformatted images are provided for review.  Automated exposure control, iterative reconstruction, and/or weight based adjustment of the mA/kV was utilized to reduce the radiation dose to as low as reasonably achievable. COMPARISON: CTA chest 07/13/2022 HISTORY: ORDERING SYSTEM PROVIDED HISTORY: breast cancer staging TECHNOLOGIST PROVIDED HISTORY: Additional Contrast?->Oral Reason for exam:->breast cancer staging What reading provider will be dictating this exam?->CRC FINDINGS: CHEST: Oncologic: Right breast mass again noted. Right axillary lymphadenopathy again noted. No pathologically enlarged the mediastinal or hilar lymph nodes. A 9 mm right lower paratracheal (series 2, image 71) lymph node is prominent but nonspecific. No pulmonary mass or suspicious pulmonary nodules. No lytic or blastic osseous metastases are seen. Non oncologic: Unremarkable thyroid. Atherosclerotic disease including coronary disease. No thoracic aortic aneurysm or dissection. No pulmonary embolism. Cardiomegaly. No pericardial effusion. Unremarkable esophagus. Trace right pleural effusion. Mild pleural thickening in the left lower lobe periphery, with associated mild round atelectasis. There are areas of scarring and/or linear atelectasis in both lungs. Mild bronchial wall thickening in the bilateral lower lobes. Scarring in the bilateral lung apices. Pulmonary emphysema. Degenerative changes of the spine. Right chest wall edema. Unusual appearance of the cortex and endosteal portion of the visualized left humerus (series 2, image 19). ABDOMEN/PELVIS: Oncologic: No solid organ metastasis. No lymphadenopathy. No lytic or blastic osseous metastases are seen. Non oncologic: Cholelithiasis. Unremarkable appearance of the liver, spleen, pancreas, and kidneys. Bilateral adrenal gland thickening. No free air. Mild perihepatic, bilateral pericolic gutter, and pelvic free fluid. No bowel obstruction. Status post appendectomy per history. Unremarkable bladder.  Prominent bilateral pelvic veins, which are nonspecific but can be seen in the setting pelvic congestion syndrome. No abdominal aortic aneurysm. Atherosclerotic disease. Degenerative changes and mild scoliosis of the spine. Mild degenerative changes of the hips. Anasarca, greater in the right abdominal wall. CHEST: Oncologic: Right breast mass and right axillary lymphadenopathy again noted. A right paratracheal 9 mm short axis lymph node is nonspecific. Otherwise, no evidence of metastatic disease in the chest. Non oncologic: Right chest wall edema. Pulmonary emphysema. Mild bronchitis bilaterally. Pulmonary scarring bilaterally. Trace pleural effusions. Cardiomegaly. Atherosclerotic disease. Unusual thickened appearance of the cortex in the visualized left humerus; recommend x-rays of the left humerus for further evaluation. ABDOMEN/PELVIS: Oncologic: No evidence of metastatic disease in the abdomen/pelvis. Non oncologic: Right abdominal wall edema. Cholelithiasis. Small amount of generalized free fluid. No free air. CT ABDOMEN PELVIS W IV CONTRAST Additional Contrast? Oral    Result Date: 7/15/2022  EXAMINATION: CT OF THE ABDOMEN AND PELVIS WITH CONTRAST; CT OF THE CHEST WITH CONTRAST 7/15/2022 3:43 pm TECHNIQUE: CT of the abdomen and pelvis was performed with the administration of intravenous contrast. Multiplanar reformatted images are provided for review. Automated exposure control, iterative reconstruction, and/or weight based adjustment of the mA/kV was utilized to reduce the radiation dose to as low as reasonably achievable.; CT of the chest was performed with the administration of intravenous contrast. Multiplanar reformatted images are provided for review. Automated exposure control, iterative reconstruction, and/or weight based adjustment of the mA/kV was utilized to reduce the radiation dose to as low as reasonably achievable.  COMPARISON: CTA chest 07/13/2022 HISTORY: ORDERING SYSTEM PROVIDED HISTORY: breast cancer staging TECHNOLOGIST PROVIDED HISTORY: Additional Contrast?->Oral Reason for exam:->breast cancer staging What reading provider will be dictating this exam?->CRC FINDINGS: CHEST: Oncologic: Right breast mass again noted. Right axillary lymphadenopathy again noted. No pathologically enlarged the mediastinal or hilar lymph nodes. A 9 mm right lower paratracheal (series 2, image 71) lymph node is prominent but nonspecific. No pulmonary mass or suspicious pulmonary nodules. No lytic or blastic osseous metastases are seen. Non oncologic: Unremarkable thyroid. Atherosclerotic disease including coronary disease. No thoracic aortic aneurysm or dissection. No pulmonary embolism. Cardiomegaly. No pericardial effusion. Unremarkable esophagus. Trace right pleural effusion. Mild pleural thickening in the left lower lobe periphery, with associated mild round atelectasis. There are areas of scarring and/or linear atelectasis in both lungs. Mild bronchial wall thickening in the bilateral lower lobes. Scarring in the bilateral lung apices. Pulmonary emphysema. Degenerative changes of the spine. Right chest wall edema. Unusual appearance of the cortex and endosteal portion of the visualized left humerus (series 2, image 19). ABDOMEN/PELVIS: Oncologic: No solid organ metastasis. No lymphadenopathy. No lytic or blastic osseous metastases are seen. Non oncologic: Cholelithiasis. Unremarkable appearance of the liver, spleen, pancreas, and kidneys. Bilateral adrenal gland thickening. No free air. Mild perihepatic, bilateral pericolic gutter, and pelvic free fluid. No bowel obstruction. Status post appendectomy per history. Unremarkable bladder. Prominent bilateral pelvic veins, which are nonspecific but can be seen in the setting pelvic congestion syndrome. No abdominal aortic aneurysm. Atherosclerotic disease. Degenerative changes and mild scoliosis of the spine. Mild degenerative changes of the hips.   Anasarca, greater in the right abdominal wall. CHEST: Oncologic: Right breast mass and right axillary lymphadenopathy again noted. A right paratracheal 9 mm short axis lymph node is nonspecific. Otherwise, no evidence of metastatic disease in the chest. Non oncologic: Right chest wall edema. Pulmonary emphysema. Mild bronchitis bilaterally. Pulmonary scarring bilaterally. Trace pleural effusions. Cardiomegaly. Atherosclerotic disease. Unusual thickened appearance of the cortex in the visualized left humerus; recommend x-rays of the left humerus for further evaluation. ABDOMEN/PELVIS: Oncologic: No evidence of metastatic disease in the abdomen/pelvis. Non oncologic: Right abdominal wall edema. Cholelithiasis. Small amount of generalized free fluid. No free air. US GALLBLADDER RUQ    Result Date: 8/2/2022  EXAMINATION: RIGHT UPPER QUADRANT ULTRASOUND 8/2/2022 9:31 am COMPARISON: None. HISTORY: ORDERING SYSTEM PROVIDED HISTORY: Epigastric pain TECHNOLOGIST PROVIDED HISTORY: Reason for exam:->Epigastric pain What reading provider will be dictating this exam?->[facility] FINDINGS: LIVER:  Visualized portions of the liver appear within normal limits. BILIARY SYSTEM:  Gallbladder is not distended and contains mobile echogenic foci compatible with sludge ball/early stone formation. No evidence of gallbladder wall thickening. No tenderness in the gallbladder fossa. Common bile duct is 5 mm. RIGHT KIDNEY: Not evaluated PANCREAS:  Not evaluated OTHER: No evidence of right upper quadrant ascites. There is evidence of cholelithiasis without ultrasound evidence of acute cholecystitis. Common bile duct is normal at 5 mm. No ascites.      XR CHEST PORTABLE    Result Date: 7/28/2022  EXAMINATION: ONE XRAY VIEW OF THE CHEST 7/28/2022 6:53 am COMPARISON: Chest CT angiogram 25 July 2022 and chest radiograph 6 June 2013 HISTORY: ORDERING SYSTEM PROVIDED HISTORY: hypoxia TECHNOLOGIST PROVIDED HISTORY: Reason for exam:->hypoxia What reading provider will be dictating this exam?->CRC FINDINGS: Suspected scarring or chronic atelectasis at the left lower lobe seen on multiple prior studies. Heart is enlarged. Pulmonary vascularity is normal. There is obstructive airways disease. Likely scarring or chronic atelectasis at the left lower lobe. Cardiomegaly. Obstructive airways disease. CTA NECK W CONTRAST    Result Date: 7/25/2022  EXAMINATION: CTA OF THE HEAD WITH CONTRAST WITH PERFUSION; CTA OF THE NECK; CTA OF THE HEAD WITH CONTRAST 7/25/2022 2:12 pm: TECHNIQUE: CTA of the head/brain was performed with the administration of intravenous contrast. Multiplanar reformatted images are provided for review. MIP images are provided for review. Automated exposure control, iterative reconstruction, and/or weight based adjustment of the mA/kV was utilized to reduce the radiation dose to as low as reasonably achievable.; CTA of the neck was performed with the administration of intravenous contrast. Multiplanar reformatted images are provided for review. MIP images are provided for review. Stenosis of the internal carotid arteries measured using NASCET criteria. Automated exposure control, iterative reconstruction, and/or weight based adjustment of the mA/kV was utilized to reduce the radiation dose to as low as reasonably achievable. COMPARISON: None. HISTORY: ORDERING SYSTEM PROVIDED HISTORY: matthew TECHNOLOGIST PROVIDED HISTORY: Reason for exam:->matthew Has a \"code stroke\" or \"stroke alert\" been called? ->Yes Decision Support Exception - unselect if not a suspected or confirmed emergency medical condition->Emergency Medical Condition (MA) FINDINGS: CTA NECK: AORTIC ARCH/ARCH VESSELS: No dissection or arterial injury. No significant stenosis of the brachiocephalic or subclavian arteries. CAROTID ARTERIES: Prominent atherosclerotic in the proximal right ICA results in approximately 70% maximal stenosis.   Mild calcified atherosclerosis in the left carotid bulb and proximal ICA results in less than 50% stenosis. VERTEBRAL ARTERIES: Approximately 50% stenotic plaque is seen at the origin of the left vertebral artery (coronal reformats, image series 614, image 51). The remainder of the left vertebral artery is unremarkable. The right vertebral artery is unremarkable. The SOFT TISSUES: The lung apices are clear. No cervical or superior mediastinal lymphadenopathy. The larynx and pharynx are unremarkable. No acute abnormality of the salivary and thyroid glands. BONES: No acute osseous abnormality. CTA HEAD: ANTERIOR CIRCULATION: No significant stenosis of the intracranial internal carotid, anterior cerebral, or middle cerebral arteries. No aneurysm. POSTERIOR CIRCULATION: No significant stenosis of the vertebral, basilar, or posterior cerebral arteries. No aneurysm. OTHER: No dural venous sinus thrombosis on this non-dedicated study. BRAIN: No mass effect or midline shift. No extra-axial fluid collection. The gray-white differentiation is maintained. CT PERFUSION: EXAM QUALITY: The examination is diagnostic with appropriate arterial inflow and venous outflow curves, and diagnostic perfusion maps. CORE INFARCT: The total area of ischemic core is 0 mL (CBF<30% volume). TOTAL HYPOPERFUSION: The total area of hypoperfusion is 0 mL (Tmax>6s volume). On the color images, there is suggestion of mild ischemia in the right parietal lobe, with T-max greater than 4 seconds, corresponding to the site of edema identified in the right parietal lobe. PENUMBRA: The penumbra (mismatch) volume is 0 mL and is located in the n/a. The mismatch ratio is n/a.     CT PERFUSION OF THE HEAD: 1. Area of ischemia is seen in the right parietal lobe, corresponding to the site of edema identified on the CT of the head. CTA OF THE HEAD: 1. No major arterial stenosis is seen in the brain. CTA OF THE NECK: 1. Approximately 70% stenosis in the proximal right ICA.  2. Less than 50% stenosis in the proximal left ICA. 3. Approximately 50% stenosis of the origin of the left vertebral artery. 4. No significant stenosis in the right vertebral artery. RECOMMENDATIONS: Unavailable     CTA PULMONARY W CONTRAST    Result Date: 7/25/2022  EXAMINATION: CTA OF THE CHEST 7/25/2022 2:47 pm TECHNIQUE: CTA of the chest was performed after the administration of intravenous contrast.  Multiplanar reformatted images are provided for review. MIP images are provided for review. Automated exposure control, iterative reconstruction, and/or weight based adjustment of the mA/kV was utilized to reduce the radiation dose to as low as reasonably achievable. COMPARISON: None. HISTORY: ORDERING SYSTEM PROVIDED HISTORY: hypoxic TECHNOLOGIST PROVIDED HISTORY: Reason for exam:->hypoxic Decision Support Exception - unselect if not a suspected or confirmed emergency medical condition->Emergency Medical Condition (MA) What reading provider will be dictating this exam?->CRC FINDINGS: No filling defect in the pulmonary arteries to suggest pulmonary arterial embolism. The heart is moderately enlarged. No pericardial effusion. Redemonstration of enlarged lower paratracheal lymph node measuring approximately 13 x 12 mm. Redemonstration of prominent lymph nodes adjacent to left margin of ascending thoracic aorta and at level of AP window. No airspace opacity or pleural effusion. Peribronchial thickening bilaterally. Few areas of bronchial stenosis involving right and left lower lobes. Redemonstration of atelectatic changes in left lung base with volume loss. Scarring again demonstrated at level of lung apices. View of the upper abdomen shows reflux of contrast material into the hepatic veins. Redemonstration of mass associated with the right breast.  Redemonstration of enlarged right axillary lymph nodes. 1. Redemonstration of right breast mass with right axillary lymphadenopathy. 2. No evidence of pulmonary embolism.  3. Redemonstration of multiple enlarged mediastinal lymph nodes notable in lower paratracheal location. 4. Moderate cardiomegaly with findings suggesting right heart failure. 5. Stable atelectatic changes in left lung base. 6. Peribronchial thickening bilaterally suggesting inflammation with sites of bronchial stenosis notable in posterior segments of the lower lobes. CTA PULMONARY W CONTRAST    Result Date: 7/13/2022  EXAMINATION: CTA OF THE CHEST 7/13/2022 11:52 am TECHNIQUE: CTA of the chest was performed after the administration of intravenous contrast.  Multiplanar reformatted images are provided for review. MIP images are provided for review. Automated exposure control, iterative reconstruction, and/or weight based adjustment of the mA/kV was utilized to reduce the radiation dose to as low as reasonably achievable. COMPARISON: None. HISTORY: ORDERING SYSTEM PROVIDED HISTORY: Right breast mass, swelling of b/l lower extremities, swelling of R UE, elevated troponin & BNP, hx of smoking TECHNOLOGIST PROVIDED HISTORY: Reason for exam:->Right breast mass, swelling of b/l lower extremities, swelling of R UE, elevated troponin & BNP, hx of smoking Decision Support Exception - unselect if not a suspected or confirmed emergency medical condition->Emergency Medical Condition (MA) What reading provider will be dictating this exam?->CRC FINDINGS: Pulmonary Arteries: Pulmonary arteries are adequately opacified for evaluation. No evidence of intraluminal filling defect to suggest pulmonary embolism. Main pulmonary artery is normal in caliber. Mediastinum: No evidence of mediastinal lymphadenopathy. The heart is enlarged. There is significant right atrial enlargement. Lungs/pleura: There are emphysematous changes. There is no pulmonary infiltrate, mass or suspicious pulmonary nodule. Significant biapical pleuroparenchymal scarring is noted. There is pleuroparenchymal scarring and atelectasis seen within the left lower lobe.  There is no right or left pleural effusion. There are no findings of paul pulmonary edema. Upper Abdomen: Limited images of the upper abdomen are unremarkable. Soft Tissues/Bones: There is a irregular soft tissue mass seen within the right breast which is irregular. The right breast mass measures approximately 6 cm x 4 cm by 5.4 cm. There is right lateral chest wall edema. There is no adjacent bony erosion. There is skin thickening of the right breast.     1. There is no pulmonary embolus. 2. Irregular soft tissue mass within the right breast measuring 6 cm x 4 cm x 5.4 cm. There is adjacent skin thickening as well as soft tissue edema within the right lateral chest wall. There are enlarged lymph nodes within the right axilla. This finding is highly suggestive of breast malignancy. 3. There is no appreciable adjacent metastatic disease to the right ribs. 4. Emphysematous changes. There is no pulmonary infiltrate 5. Pleuroparenchymal scarring and atelectasis seen within the left lung base. 6. Cardiomegaly with significant right atrial enlargement. US DUP UPPER EXTREMITY RIGHT VENOUS    Result Date: 2022  Patient MRN:  [id number] : 1955 Age: 78 years Gender: Female Order Date:  2022 5:02 PM EXAM: US DUP UPPER EXTREMITY RIGHT VENOUS NUMBER OF IMAGES: INDICATION:  Edema, erythema, likely superficial thrombophlebitis but r/o DVT Edema, erythema, likely superficial thrombophlebitis but r/o DVT What reading provider will be dictating this exam?->[facility] COMPARISON: None Within the visualized vessels, there is no evidence for deep venous thrombosis There is good compressibility, there is good augmentation, there is good color flow.      Within the visualized vessels there is no evidence for deep venous thrombosis     US DUP UPPER EXTREMITY RIGHT VENOUS    Result Date: 2022  Patient MRN:  [id number] : 1955 Age: 78 years Gender: Female Order Date:  2022 6:32 PM EXAM: US DUP UPPER EXTREMITY RIGHT VENOUS NUMBER OF IMAGES:  20 INDICATION:  pain, swelling pain, swelling What reading provider will be dictating this exam?->[facility] COMPARISON: None The right cephalic vein is not seen Within the visualized vessels, there is no evidence for deep venous thrombosis There is good compressibility, there is good augmentation, there is good color flow. Within the visualized vessels there is no evidence for deep venous thrombosis     US DUP LOWER EXTREMITY RIGHT ARTERIES    Result Date: 7/26/2022  EXAMINATION: ARTERIAL DUPLEX ULTRASOUND OF THE RIGHT LOWER EXTREMITY  7/26/2022 12:58 pm TECHNIQUE: Duplex ultrasound using B-mode/gray scaled imaging, Doppler spectral analysis and color flow Doppler was obtained of the lower right extremity. COMPARISON: None. HISTORY: ORDERING SYSTEM PROVIDED HISTORY: Coldness and pain on right foot, petechia, erythema TECHNOLOGIST PROVIDED HISTORY: Reason for exam:->Coldness and pain on right foot, petechia, erythema What reading provider will be dictating this exam?->CRC FINDINGS: Flow velocities were measured as follows: Com. Fem. 171.7 cm/sec Prof.           82.6 cm/sec SFA Prox. 113.7 cm/sec SFA Mid.     142.9 cm/sec SFA Dist.     93.6 cm/sec Pop.           62.5 cm/sec PTA            69.5 cm/sec Peroneal artery: 40.9 centimeters/second SONY            61.8 cm/sec Biphasic and triphasic waveforms are seen throughout the right lower extremity arterial system without without evidence of flow-limiting stenosis or significant peripheral arterial disease. No evidence of peripheral arterial disease with normal waveforms and velocities throughout the right lower extremity arterial system.  RECOMMENDATIONS: Unavailable     US DUP LOWER EXTREMITY RIGHT YAMILET    Result Date: 7/26/2022  EXAMINATION: DUPLEX VENOUS ULTRASOUND OF THE RIGHT LOWER EXTREMITY 7/26/2022 12:58 pm TECHNIQUE: Duplex ultrasound using B-mode/gray scaled imaging and Doppler spectral analysis and color flow was obtained of the deep venous structures of the right lower extremity. COMPARISON: None. HISTORY: ORDERING SYSTEM PROVIDED HISTORY: edema, pain of right foot TECHNOLOGIST PROVIDED HISTORY: Reason for exam:->edema, pain of right foot FINDINGS: The visualized veins of the right lower extremity are patent and free of echogenic thrombus. The veins demonstrate good compressibility with normal color flow study and spectral analysis. No evidence of DVT in the right lower extremity. CT BRAIN PERFUSION    Result Date: 7/25/2022  EXAMINATION: CTA OF THE HEAD WITH CONTRAST WITH PERFUSION; CTA OF THE NECK; CTA OF THE HEAD WITH CONTRAST 7/25/2022 2:12 pm: TECHNIQUE: CTA of the head/brain was performed with the administration of intravenous contrast. Multiplanar reformatted images are provided for review. MIP images are provided for review. Automated exposure control, iterative reconstruction, and/or weight based adjustment of the mA/kV was utilized to reduce the radiation dose to as low as reasonably achievable.; CTA of the neck was performed with the administration of intravenous contrast. Multiplanar reformatted images are provided for review. MIP images are provided for review. Stenosis of the internal carotid arteries measured using NASCET criteria. Automated exposure control, iterative reconstruction, and/or weight based adjustment of the mA/kV was utilized to reduce the radiation dose to as low as reasonably achievable. COMPARISON: None. HISTORY: ORDERING SYSTEM PROVIDED HISTORY: matthew TECHNOLOGIST PROVIDED HISTORY: Reason for exam:->matthew Has a \"code stroke\" or \"stroke alert\" been called? ->Yes Decision Support Exception - unselect if not a suspected or confirmed emergency medical condition->Emergency Medical Condition (MA) FINDINGS: CTA NECK: AORTIC ARCH/ARCH VESSELS: No dissection or arterial injury. No significant stenosis of the brachiocephalic or subclavian arteries.  CAROTID ARTERIES: Prominent atherosclerotic in the proximal right ICA results in approximately 70% maximal stenosis. Mild calcified atherosclerosis in the left carotid bulb and proximal ICA results in less than 50% stenosis. VERTEBRAL ARTERIES: Approximately 50% stenotic plaque is seen at the origin of the left vertebral artery (coronal reformats, image series 614, image 51). The remainder of the left vertebral artery is unremarkable. The right vertebral artery is unremarkable. The SOFT TISSUES: The lung apices are clear. No cervical or superior mediastinal lymphadenopathy. The larynx and pharynx are unremarkable. No acute abnormality of the salivary and thyroid glands. BONES: No acute osseous abnormality. CTA HEAD: ANTERIOR CIRCULATION: No significant stenosis of the intracranial internal carotid, anterior cerebral, or middle cerebral arteries. No aneurysm. POSTERIOR CIRCULATION: No significant stenosis of the vertebral, basilar, or posterior cerebral arteries. No aneurysm. OTHER: No dural venous sinus thrombosis on this non-dedicated study. BRAIN: No mass effect or midline shift. No extra-axial fluid collection. The gray-white differentiation is maintained. CT PERFUSION: EXAM QUALITY: The examination is diagnostic with appropriate arterial inflow and venous outflow curves, and diagnostic perfusion maps. CORE INFARCT: The total area of ischemic core is 0 mL (CBF<30% volume). TOTAL HYPOPERFUSION: The total area of hypoperfusion is 0 mL (Tmax>6s volume). On the color images, there is suggestion of mild ischemia in the right parietal lobe, with T-max greater than 4 seconds, corresponding to the site of edema identified in the right parietal lobe. PENUMBRA: The penumbra (mismatch) volume is 0 mL and is located in the n/a. The mismatch ratio is n/a.     CT PERFUSION OF THE HEAD: 1. Area of ischemia is seen in the right parietal lobe, corresponding to the site of edema identified on the CT of the head. CTA OF THE HEAD: 1. No major arterial stenosis is seen in the brain.  CTA OF THE NECK: 1. Approximately 70% stenosis in the proximal right ICA. 2. Less than 50% stenosis in the proximal left ICA. 3. Approximately 50% stenosis of the origin of the left vertebral artery. 4. No significant stenosis in the right vertebral artery. RECOMMENDATIONS: Unavailable     NM BONE SCAN WHOLE BODY    Result Date: 2022  Patient MRN: [id number] : 1955 Age:  78 years Gender: Female Order Date: 2022 9:37 AM Exam: NM BONE SCAN WHOLE BODY Number of Images: 2 views Indication:   breast cancer breast cancer What reading provider will be dictating this exam?->[facility] Comparison: CT chest 7/15/2022 Findings: Bone scan was performed following the injection of 20 mCi of MDP. Tracer uptake is seen compatible with degenerative changes. Tracer uptake is seen in both involving both knees and to lesser extent the shoulders and wrists consistent with degenerative changes. Similar findings are seen involving the left foot. Tracer uptake is seen in the right anterior ribs. There appear to be 4 sequential regions of tracer uptake in the right anterior ribs beginning with the third and involving the fourth fifth and sixth ribs. When compared with the previous CT scan of the chest there are blastic lesions in this location with associated fractures. Pathologic fracture could give this appearance. This could be posttraumatic. Metastatic disease is not excluded. The remaining biodistribution of radiotracer appears to be within normal limits     Findings compatible with degenerative changes , tracer uptake in the right anterior ribs which within comparing with the CT of the chest is concerning for pathologic fracture.     US BREAST LIMITED LEFT    Result Date: 7/15/2022  EXAMINATION: DIAGNOSTIC DIGITAL BILATERAL BREASTS MAMMOGRAM WITH TOMOSYNTHESIS; TARGETED ULTRASOUND OF THE RIGHT BREAST; TARGETED ULTRASOUND OF THE LEFT BREAST, 7/15/2022 10:35 am TECHNIQUE: Diagnostic mammography of the bilateral breasts was performed with tomosynthesis. 2D standard and 3D tomosynthesis combination imaging performed through both breasts. Computer aided detection was utilized in the interpretation of this exam.; Targeted ultrasound of the right breast was performed.; Target ultrasound of the left breast was performed. Views: Right cc and bilateral MLO views with tomosynthesis COMPARISON: Multiple prior studies, the most recent dated July 13, 2022 HISTORY: ORDERING SYSTEM PROVIDED HISTORY: Breast mass, right TECHNOLOGIST PROVIDED HISTORY: [id number] Patient in 0B Reason for exam:->Irregular soft tissue mass within the right breast measuring 6 cm x 4 cm x FINDINGS: Bilateral diagnostic mammogram: Exam is suboptimal due to lack of left CC view. The right breast is smaller than the left. There is diffuse subcutaneous edema and skin thickening on the right. There are bilateral focal asymmetries. Bilateral diagnostic ultrasound: Targeted bilateral breast ultrasound was performed utilizing high-resolution, real-time scanning. In the right breast 12 o'clock 1 cm from the nipple, there is a 4.9 x 3.5 x 3.9 cm irregular, hypoechoic mass with angular margins. In the right axilla, there are two abnormal right axillary lymph nodes. There are scattered complicated cysts of varying size in the left breast.  No suspicious cystic or solid mass identified on the left. 1.  Irregular mass in the right breast 12 o'clock and right axillary lymphadenopathy are suspicious. 2.  Benign findings with no mammographic or sonographic evidence of malignancy in the left breast. Recommendation: Ultrasound-guided core biopsies of the right breast and right axilla are advised. BIRADS: BIRADS - CATEGORY 4 Suspicious Abnormality. Biopsy should be considered at this time. OVERALL ASSESSMENT - SUSPICIOUS A letter of notification will be sent to the patient regarding the results.  RISK ASSESSMENT: During this patient's visit, information obtained was used to generate a lifetime risk assessment using the Tyrer-Cuzick model (also called the [employer] Breast Cancer Intervention study Breast Cancer Risk Evaluation Tool). LIFETIME RISK: Patient has a Tyrer-Cuzick score of: 3.5% BREAST TISSUE DENSITY Heterogeneously Dense (grade C) AVERAGE RISK ( < 15% Lifetime Risk) Yearly screening mammograms starting at age 36 and older. Regardless of breast density, tomosynthesis is suggested. Monthly self-breast exams are recommended for women age 27 and older. Note: Mammography is not 100% accurate in the detection of breast cancer. Accuracy decreases as mammographic density of the breast increases. A negative mammogram should not deter further evaluation (including biopsy) of suspicious physical findings. Recommendations are based on NCCN ([address]) and ACR [employer] of Radiology) guidelines. Thank you for sending your patient to this ACR and FDA approved facility. If there are physician concerns regarding this report, a Radiologist can be reached by calling the following number [phone]. US BREAST LIMITED RIGHT    Result Date: 7/15/2022  EXAMINATION: DIAGNOSTIC DIGITAL BILATERAL BREASTS MAMMOGRAM WITH TOMOSYNTHESIS; TARGETED ULTRASOUND OF THE RIGHT BREAST; TARGETED ULTRASOUND OF THE LEFT BREAST, 7/15/2022 10:35 am TECHNIQUE: Diagnostic mammography of the bilateral breasts was performed with tomosynthesis. 2D standard and 3D tomosynthesis combination imaging performed through both breasts. Computer aided detection was utilized in the interpretation of this exam.; Targeted ultrasound of the right breast was performed.; Target ultrasound of the left breast was performed.  Views: Right cc and bilateral MLO views with tomosynthesis COMPARISON: Multiple prior studies, the most recent dated July 13, 2022 HISTORY: ORDERING SYSTEM PROVIDED HISTORY: Breast mass, right TECHNOLOGIST PROVIDED HISTORY: [address] Patient in 0B Reason for exam:->Irregular soft tissue mass within the right breast measuring 6 cm x 4 cm x FINDINGS: Bilateral diagnostic mammogram: Exam is suboptimal due to lack of left CC view. The right breast is smaller than the left. There is diffuse subcutaneous edema and skin thickening on the right. There are bilateral focal asymmetries. Bilateral diagnostic ultrasound: Targeted bilateral breast ultrasound was performed utilizing high-resolution, real-time scanning. In the right breast 12 o'clock 1 cm from the nipple, there is a 4.9 x 3.5 x 3.9 cm irregular, hypoechoic mass with angular margins. In the right axilla, there are two abnormal right axillary lymph nodes. There are scattered complicated cysts of varying size in the left breast.  No suspicious cystic or solid mass identified on the left. 1.  Irregular mass in the right breast 12 o'clock and right axillary lymphadenopathy are suspicious. 2.  Benign findings with no mammographic or sonographic evidence of malignancy in the left breast. Recommendation: Ultrasound-guided core biopsies of the right breast and right axilla are advised. BIRADS: BIRADS - CATEGORY 4 Suspicious Abnormality. Biopsy should be considered at this time. OVERALL ASSESSMENT - SUSPICIOUS A letter of notification will be sent to the patient regarding the results. RISK ASSESSMENT: During this patient's visit, information obtained was used to generate a lifetime risk assessment using the Tyrer-Cuzick model (also called the ADORE International Breast Cancer Intervention study Breast Cancer Risk Evaluation Tool). LIFETIME RISK: Patient has a Tyrer-Cuzick score of: 3.5% BREAST TISSUE DENSITY Heterogeneously Dense (grade C) AVERAGE RISK ( < 15% Lifetime Risk) Yearly screening mammograms starting at age 36 and older. Regardless of breast density, tomosynthesis is suggested. Monthly self-breast exams are recommended for women age 27 and older.  Note: Mammography is not 100% accurate in the detection of breast cancer. Accuracy decreases as mammographic density of the breast increases. A negative mammogram should not deter further evaluation (including biopsy) of suspicious physical findings. Recommendations are based on NCCN ([address]) and ACR [employer]) guidelines. Thank you for sending your patient to this ACR and FDA approved facility. If there are physician concerns regarding this report, a Radiologist can be reached by calling the following number [phone]. CTA HEAD W CONTRAST    Result Date: 7/25/2022  EXAMINATION: CTA OF THE HEAD WITH CONTRAST WITH PERFUSION; CTA OF THE NECK; CTA OF THE HEAD WITH CONTRAST 7/25/2022 2:12 pm: TECHNIQUE: CTA of the head/brain was performed with the administration of intravenous contrast. Multiplanar reformatted images are provided for review. MIP images are provided for review. Automated exposure control, iterative reconstruction, and/or weight based adjustment of the mA/kV was utilized to reduce the radiation dose to as low as reasonably achievable.; CTA of the neck was performed with the administration of intravenous contrast. Multiplanar reformatted images are provided for review. MIP images are provided for review. Stenosis of the internal carotid arteries measured using NASCET criteria. Automated exposure control, iterative reconstruction, and/or weight based adjustment of the mA/kV was utilized to reduce the radiation dose to as low as reasonably achievable. COMPARISON: None. HISTORY: ORDERING SYSTEM PROVIDED HISTORY: matthew TECHNOLOGIST PROVIDED HISTORY: Reason for exam:->matthew Has a \"code stroke\" or \"stroke alert\" been called? ->Yes Decision Support Exception - unselect if not a suspected or confirmed emergency medical condition->Emergency Medical Condition (MA) FINDINGS: CTA NECK: AORTIC ARCH/ARCH VESSELS: No dissection or arterial injury. No significant stenosis of the brachiocephalic or subclavian arteries. CAROTID ARTERIES: Prominent atherosclerotic in the proximal right ICA results in approximately 70% maximal stenosis. Mild calcified atherosclerosis in the left carotid bulb and proximal ICA results in less than 50% stenosis. VERTEBRAL ARTERIES: Approximately 50% stenotic plaque is seen at the origin of the left vertebral artery (coronal reformats, image series 614, image 51). The remainder of the left vertebral artery is unremarkable. The right vertebral artery is unremarkable. The SOFT TISSUES: The lung apices are clear. No cervical or superior mediastinal lymphadenopathy. The larynx and pharynx are unremarkable. No acute abnormality of the salivary and thyroid glands. BONES: No acute osseous abnormality. CTA HEAD: ANTERIOR CIRCULATION: No significant stenosis of the intracranial internal carotid, anterior cerebral, or middle cerebral arteries. No aneurysm. POSTERIOR CIRCULATION: No significant stenosis of the vertebral, basilar, or posterior cerebral arteries. No aneurysm. OTHER: No dural venous sinus thrombosis on this non-dedicated study. BRAIN: No mass effect or midline shift. No extra-axial fluid collection. The gray-white differentiation is maintained. CT PERFUSION: EXAM QUALITY: The examination is diagnostic with appropriate arterial inflow and venous outflow curves, and diagnostic perfusion maps. CORE INFARCT: The total area of ischemic core is 0 mL (CBF<30% volume). TOTAL HYPOPERFUSION: The total area of hypoperfusion is 0 mL (Tmax>6s volume). On the color images, there is suggestion of mild ischemia in the right parietal lobe, with T-max greater than 4 seconds, corresponding to the site of edema identified in the right parietal lobe. PENUMBRA: The penumbra (mismatch) volume is 0 mL and is located in the n/a. The mismatch ratio is n/a.     CT PERFUSION OF THE HEAD: 1. Area of ischemia is seen in the right parietal lobe, corresponding to the site of edema identified on the CT of the head.  CTA OF THE poorly differentiated carcinoma with squamous differentiation. These findings are concordant with imaging. Recommend surgical consultation and review by the [facility] interdisciplinary team with generation of multidisciplinary recommendations. Diagnostic mammogram is also advised in one year unless earlier is clinically indicated. Result Date: 7/22/2022  PROCEDURE: ULTRASOUND GUIDED CORE BIOPSY LYMPH NODE Ultrasound-guided clip placement 7/15/2022 HISTORY: ORDERING SYSTEM PROVIDED HISTORY: Abnormal ultrasound TECHNOLOGIST PROVIDED HISTORY: [facility] Protocol Patient in [address] Reason for exam:->Abnormal ultrasound PHYSICIANS: Ashley Keene SEDATION: None. PROCEDURE: Following discussion of alternatives, risks, and benefits, informed consent was obtained. A preprocedural time out was performed. The right axilla was prepped and draped in the usual sterile fashion. Local anesthesia was achieved with buffered 1 percent lidocaine. A 14 gauge core biopsy needle was utilized under real-time sonographic guidance to collect 3 cores from the right axillary lymphadenopathy. A ribbon shaped clip was placed. Hemostasis was obtained with manual compression. The patient tolerated the procedure without complication, verbalized understanding of postprocedural care instructions, and was discharged home in satisfactory condition. Surgical pathology report is pending at the time of the study, and addendum will be generated when this information becomes available. Successful ULTRASOUND guided core biopsy of right axillary lymphadenopathy. Pathology pending. [facility] SHAHEEN DIGITAL DIAGNOSTIC BILATERAL    Result Date: 7/15/2022  EXAMINATION: DIAGNOSTIC DIGITAL BILATERAL BREASTS MAMMOGRAM WITH TOMOSYNTHESIS; TARGETED ULTRASOUND OF THE RIGHT BREAST; TARGETED ULTRASOUND OF THE LEFT BREAST, 7/15/2022 10:35 am TECHNIQUE: Diagnostic mammography of the bilateral breasts was performed with tomosynthesis.   2D standard and 3D tomosynthesis combination imaging performed through both breasts. Computer aided detection was utilized in the interpretation of this exam.; Targeted ultrasound of the right breast was performed.; Target ultrasound of the left breast was performed. Views: Right cc and bilateral MLO views with tomosynthesis COMPARISON: Multiple prior studies, the most recent dated July 13, 2022 HISTORY: ORDERING SYSTEM PROVIDED HISTORY: Breast mass, right TECHNOLOGIST PROVIDED HISTORY: [address] Patient in 0B Reason for exam:->Irregular soft tissue mass within the right breast measuring 6 cm x 4 cm x FINDINGS: Bilateral diagnostic mammogram: Exam is suboptimal due to lack of left CC view. The right breast is smaller than the left. There is diffuse subcutaneous edema and skin thickening on the right. There are bilateral focal asymmetries. Bilateral diagnostic ultrasound: Targeted bilateral breast ultrasound was performed utilizing high-resolution, real-time scanning. In the right breast 12 o'clock 1 cm from the nipple, there is a 4.9 x 3.5 x 3.9 cm irregular, hypoechoic mass with angular margins. In the right axilla, there are two abnormal right axillary lymph nodes. There are scattered complicated cysts of varying size in the left breast.  No suspicious cystic or solid mass identified on the left. 1.  Irregular mass in the right breast 12 o'clock and right axillary lymphadenopathy are suspicious. 2.  Benign findings with no mammographic or sonographic evidence of malignancy in the left breast. Recommendation: Ultrasound-guided core biopsies of the right breast and right axilla are advised. BIRADS: BIRADS - CATEGORY 4 Suspicious Abnormality. Biopsy should be considered at this time. OVERALL ASSESSMENT - SUSPICIOUS A letter of notification will be sent to the patient regarding the results.  RISK ASSESSMENT: During this patient's visit, information obtained was used to generate a lifetime risk assessment using the Tyrer-Cuzick model (also called the ADORE International Breast Cancer Intervention study Breast Cancer Risk Evaluation Tool). LIFETIME RISK: Patient has a Tyrer-Cuzick score of: 3.5% BREAST TISSUE DENSITY Heterogeneously Dense (grade C) AVERAGE RISK ( < 15% Lifetime Risk) Yearly screening mammograms starting at age 36 and older. Regardless of breast density, tomosynthesis is suggested. Monthly self-breast exams are recommended for women age 27 and older. Note: Mammography is not 100% accurate in the detection of breast cancer. Accuracy decreases as mammographic density of the breast increases. A negative mammogram should not deter further evaluation (including biopsy) of suspicious physical findings. Recommendations are based on NCCN ([address]) and ACR [employer]) guidelines. Thank you for sending your patient to this ACR and FDA approved facility. If there are physician concerns regarding this report, a Radiologist can be reached by calling the following number [phone]. US DUP LOWER EXTREMITIES BILATERAL VENOUS    Result Date: 2022  Patient MRN:  [id number] : 1955 Age: 78 years Gender: Female Order Date:  2022 6:32 PM EXAM: US DUP LOWER EXTREMITIES BILATERAL VENOUS NUMBER OF IMAGES:  48 INDICATION:  swelling swelling What reading provider will be dictating this exam?->[facility] COMPARISON: None Within the visualized vessels, there is no evidence for deep venous thrombosis There is good compressibility, there is good augmentation, there is good color flow. 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 These findings are concordant with imaging.   Recommend surgical consultation and review by the [facility] interdisciplinary team with generation of multidisciplinary recommendations. Diagnostic mammogram is also advised in one year unless earlier is clinically indicated. Result Date: 7/22/2022  PROCEDURE: ULTRASOUND GUIDED CORE BIOPSY LYMPH NODE Ultrasound-guided clip placement 7/15/2022 HISTORY: ORDERING SYSTEM PROVIDED HISTORY: Abnormal ultrasound TECHNOLOGIST PROVIDED HISTORY: [facility] Protocol Patient in [address] Reason for exam:->Abnormal ultrasound PHYSICIANS: Ashley Keene SEDATION: None. PROCEDURE: Following discussion of alternatives, risks, and benefits, informed consent was obtained. A preprocedural time out was performed. The right axilla was prepped and draped in the usual sterile fashion. Local anesthesia was achieved with buffered 1 percent lidocaine. A 14 gauge core biopsy needle was utilized under real-time sonographic guidance to collect 3 cores from the right axillary lymphadenopathy. A ribbon shaped clip was placed. Hemostasis was obtained with manual compression. The patient tolerated the procedure without complication, verbalized understanding of postprocedural care instructions, and was discharged home in satisfactory condition. Surgical pathology report is pending at the time of the study, and addendum will be generated when this information becomes available. Successful ULTRASOUND guided core biopsy of right axillary lymphadenopathy. Pathology pending. Discharge Medications:      Medication List        START taking these medications      albuterol (5 MG/ML) 0.5% nebulizer solution  Commonly known as: PROVENTIL  Take 1 mL by nebulization 4 times daily as needed for Wheezing     aluminum & magnesium hydroxide-simethicone 200-200-20 MG/5ML Susp suspension  Commonly known as: MAALOX  Take 30 mLs by mouth every 6 hours as needed for Indigestion     aspirin 81 MG chewable tablet  Take 1 tablet by mouth in the morning.   Start taking on: August 5, 2022     dronabinol 2.5 MG these medications from any pharmacy    Bring a paper prescription for each of these medications  glycopyrrolate 1 MG tablet  LORazepam 1 MG tablet  oxyCODONE 5 MG immediate release tablet         Time Spent on discharge is more than 45 minutes in the examination, evaluation, counseling and review of medications and discharge plan.    +++++++++++++++++++++++++++++++++++++++++++++++++  Va Forrest MD  [facility] [place] Physician - [address] [place] [address]  +++++++++++++++++++++++++++++++++++++++++++++++++  NOTE: This report was transcribed using voice recognition software. Every effort was made to ensure accuracy; however, inadvertent computerized transcription errors may be present.

## 2022-08-04 NOTE — CONSULTS
Patient is already being followed by PM service, please see full consult from 8/1/2022. Chart reviewed. Spoke with hospice liaison this morning. Plan is for patient to be discharged home with hospice today tentatively around 1430. Scripts printed. No further palliative medicine needs at this time.     Unknown Medico, ANGELITA - CNP 8/4/2022 10:11 AM

## 2022-08-04 NOTE — DISCHARGE INSTR - COC
Continuity of Care Form    Patient Name: Dillon Hightower   :  1955  MRN:  [id number]    Admit date:  2022  Discharge date:  22    Code Status Order: DNR-CC   Advance Directives:     Admitting Physician:  America Felton MD  PCP: No primary care provider on file. Discharging Nurse: Adama Domingeuz RN  [address] Unit/Room#: 7827/6310-V  Discharging Unit Phone Number: ***    Emergency Contact:   Extended Emergency Contact Information  Primary Emergency Contact: [facility] [address] [address] Phone: [phone]  Relation: Child  Secondary Emergency Contact: Chinedu Mena  Address: [address] [place] [address] Phone: [phone]  Relation: Child    Past Surgical History:  Past Surgical History:   Procedure Laterality Date    APPENDECTOMY      INDER US PERQ DEVICE SOFT TISSUE PLMT  FIRST LESION  7/15/2022    INDER US PERQ DEVICE SOFT TISSUE PLMT  FIRST LESION 7/15/2022 [facility]    US BREAST NEEDLE BIOPSY RIGHT Right 7/15/2022    US BREAST NEEDLE BIOPSY RIGHT 7/15/2022 [facility]       Immunization History: There is no immunization history on file for this patient.     Active Problems:  Patient Active Problem List   Diagnosis Code    Chronic obstructive pulmonary disease ([facility] J44.9    Arthritis M19.90    Mild episode of recurrent major depressive disorder ([facility] F33.0    Primary hypertension I10    Elevated troponin R77.8    Acute right heart failure (HCC) I50.811    Severe pulmonary hypertension (HCC) I27.20    Tobacco abuse Z72.0    Breast mass, right N63.10    Cerebrovascular accident (CVA) ([address] I63.9    Severe protein-calorie malnutrition ([facility] E43    Pain due to neoplasm G89.3       Isolation/Infection:   Isolation            No Isolation          Patient Infection Status       None to display            Nurse Assessment:  Last Vital Signs: BP (!) 120/59   Pulse 69   Temp 98.8 °F (37.1 °C) (Temporal) Resp 18   Ht 5' 7\" (1.702 m)   Wt 104 lb 9.6 oz (47.4 kg)   SpO2 100%   BMI 16.38 kg/m²     Last documented pain score (0-10 scale): Pain Level: 9  Last Weight:   Wt Readings from Last 1 Encounters:   22 104 lb 9.6 oz (47.4 kg)     Mental Status:  oriented, alert, coherent, thought processes intact, and able to concentrate and follow conversation    IV Access:  - None    Nursing Mobility/ADLs:  Walking     Transfer    Bathing    Dressing  {CHP DME MUSE:874035740}  Toileting  {CHP DME WMOP:326473873}  Feeding  {CHP DME UHGO:065563551}  Med Admin  {CHP DME NWOC:715667699}  Med Delivery   [address] MED Delivery:447288871}    Wound Care Documentation and Therapy:        Elimination:  Continence: Bowel: {YES / XD:75673}  Bladder: {YES / NZ:05577}  Urinary Catheter: {Urinary Catheter:629946949}   Colostomy/Ileostomy/Ileal Conduit: {YES / EZ:49087}       Date of Last BM: ***    Intake/Output Summary (Last 24 hours) at 2022 1455  Last data filed at 2022 1355  Gross per 24 hour   Intake 1807 ml   Output 1 ml   Net 1806 ml     I/O last 3 completed shifts:   In: 800 [P.O.:800]  Out: 1 [Urine:1]    Safety Concerns:     [address] Safety Concerns:223082016}    Impairments/Disabilities:      [address] Impairments/Disabilities:573810338}    Nutrition Therapy:  Current Nutrition Therapy:   [address] Diet List:481987058}    Routes of Feeding: {CHP DME Other Feedings:384494057}  Liquids: {Slp liquid thickness:25764}  Daily Fluid Restriction: {CHP DME Yes amt example:430853627}  Last Modified Barium Swallow with Video (Video Swallowing Test): {Done Not Done YNDZ:608157950}    Treatments at the Time of Hospital Discharge:   Respiratory Treatments: ***  Oxygen Therapy:  {Therapy; copd oxygen:64310}  Ventilator:    { CC Vent HGUR:141955515}    Rehab Therapies: {THERAPEUTIC INTERVENTION:7184637536}  Weight Bearing Status/Restrictions: [address] Weight Bearin}  Other Medical Equipment (for information only, NOT a DME order):  {EQUIPMENT:121027631}  Other Treatments: ***    Patient's personal belongings (please select all that are sent with patient):  {CHP DME Belongings:831586743}    RN SIGNATURE:  {Esignature:409203631}    CASE MANAGEMENT/SOCIAL WORK SECTION    Inpatient Status Date: ***    Readmission Risk Assessment Score:  Readmission Risk              Risk of Unplanned Readmission:  [id number]           Discharging to Facility/ Agency   Name:   Address:  Phone:  Fax:    Dialysis Facility (if applicable)   Name:  Address:  Dialysis Schedule:  Phone:  Fax:    / signature: {Esignature:013962549}    PHYSICIAN SECTION    Prognosis: {Prognosis:4515405431}    Condition at Discharge: [address] [place] [address] Patient Condition:425892886}    Rehab Potential (if transferring to Rehab): {Prognosis:8707262232}    Recommended Labs or Other Treatments After Discharge: ***    Physician Certification: I certify the above information and transfer of Issac Neal  is necessary for the continuing treatment of the diagnosis listed and that she requires {Admit to Appropriate Level of Care:99974} for {GREATER/LESS:403051198} 30 days.      Update Admission H&P: {CHP DME Changes in AOLOF:223723799}    PHYSICIAN SIGNATURE:  {Esignature:067332420}

## 2022-08-04 NOTE — PROGRESS NOTES
Met with patient at bedside. Patient is agreeable to discharging home with hospice services. Patient expresses that she wishes to spend her last days home with family. DME ordered to be delivered at 2 pm. Latesha Prather NP providing comfort medication scripts. Spoke with daughter Shanell Persaud to inform her of discharge plan. Transportation time set for 2:30 pm with [facility].  updated of the Hospice plan of care. Perfect serve sent to Dr. Jaci Urbina for discharge order. Ambulance form in soft chart.      Electronically signed by Lupis Dave RN on 8/4/2022 at 10:39 AM

## 2022-08-05 DIAGNOSIS — Z51.5 PALLIATIVE CARE ENCOUNTER: ICD-10-CM

## 2022-08-05 DIAGNOSIS — G89.3 PAIN DUE TO NEOPLASM: Primary | ICD-10-CM

## 2022-08-05 DIAGNOSIS — Z51.5 HOSPICE CARE PATIENT: ICD-10-CM

## 2022-08-05 DIAGNOSIS — Z51.5 END OF LIFE CARE: ICD-10-CM

## 2022-08-05 RX ORDER — LORAZEPAM 1 MG/1
1 TABLET ORAL EVERY 4 HOURS PRN
Qty: 90 TABLET | Refills: 1 | Status: SHIPPED | OUTPATIENT
Start: 2022-08-05 | End: 2022-08-08 | Stop reason: SDUPTHER

## 2022-08-05 RX ORDER — OXYCODONE HYDROCHLORIDE 5 MG/1
5 TABLET ORAL EVERY 4 HOURS PRN
Qty: 90 TABLET | Refills: 0 | Status: SHIPPED | OUTPATIENT
Start: 2022-08-05 | End: 2022-08-08 | Stop reason: SDUPTHER

## 2022-08-08 ENCOUNTER — HOSPITAL ENCOUNTER (OUTPATIENT)
Dept: OTHER | Age: 67
Setting detail: THERAPIES SERIES
Discharge: HOME | End: 2022-08-08

## 2022-08-08 DIAGNOSIS — G89.3 PAIN DUE TO NEOPLASM: ICD-10-CM

## 2022-08-08 DIAGNOSIS — Z51.5 END OF LIFE CARE: ICD-10-CM

## 2022-08-08 DIAGNOSIS — Z51.5 HOSPICE CARE PATIENT: ICD-10-CM

## 2022-08-08 RX ORDER — LORAZEPAM 1 MG/1
1 TABLET ORAL EVERY 4 HOURS PRN
Qty: 90 TABLET | Refills: 1 | Status: SHIPPED | OUTPATIENT
Start: 2022-08-08 | End: 2022-09-07

## 2022-08-08 RX ORDER — OXYCODONE HYDROCHLORIDE 5 MG/1
5 TABLET ORAL EVERY 4 HOURS PRN
Qty: 90 TABLET | Refills: 0 | Status: SHIPPED | OUTPATIENT
Start: 2022-08-08 | End: 2022-08-24 | Stop reason: SDUPTHER

## 2022-08-08 NOTE — PLAN OF CARE
Patient NO Show for today's appointment. I attempted to call to reschedule, Patient's son states patient is enrolled in [place].

## 2022-08-12 PROBLEM — R77.8 ELEVATED TROPONIN: Status: RESOLVED | Noted: 2022-07-13 | Resolved: 2022-08-12

## 2022-08-24 DIAGNOSIS — Z51.5 END OF LIFE CARE: ICD-10-CM

## 2022-08-24 DIAGNOSIS — Z51.5 HOSPICE CARE PATIENT: Primary | ICD-10-CM

## 2022-08-24 DIAGNOSIS — G89.3 PAIN DUE TO NEOPLASM: ICD-10-CM

## 2022-08-24 DIAGNOSIS — R52 PAIN: ICD-10-CM

## 2022-08-24 DIAGNOSIS — R09.89 AIR HUNGER: ICD-10-CM

## 2022-08-24 RX ORDER — OXYCODONE HYDROCHLORIDE 10 MG/1
10 TABLET ORAL EVERY 4 HOURS PRN
Qty: 90 TABLET | Refills: 0 | Status: SHIPPED | OUTPATIENT
Start: 2022-08-24 | End: 2022-09-02 | Stop reason: SDUPTHER

## 2022-09-02 DIAGNOSIS — Z51.5 END OF LIFE CARE: ICD-10-CM

## 2022-09-02 DIAGNOSIS — Z51.5 HOSPICE CARE PATIENT: ICD-10-CM

## 2022-09-02 DIAGNOSIS — G89.3 PAIN DUE TO NEOPLASM: ICD-10-CM

## 2022-09-02 RX ORDER — OXYCODONE HYDROCHLORIDE 10 MG/1
10 TABLET ORAL EVERY 4 HOURS PRN
Qty: 90 TABLET | Refills: 0 | Status: SHIPPED | OUTPATIENT
Start: 2022-09-02 | End: 2022-09-19 | Stop reason: SDUPTHER

## 2022-09-19 DIAGNOSIS — G89.3 PAIN DUE TO NEOPLASM: ICD-10-CM

## 2022-09-19 DIAGNOSIS — F41.9 ANXIETY: Primary | ICD-10-CM

## 2022-09-19 DIAGNOSIS — Z51.5 END OF LIFE CARE: ICD-10-CM

## 2022-09-19 DIAGNOSIS — Z51.5 HOSPICE CARE PATIENT: ICD-10-CM

## 2022-09-19 RX ORDER — OXYCODONE HYDROCHLORIDE 10 MG/1
10 TABLET ORAL EVERY 4 HOURS PRN
Qty: 90 TABLET | Refills: 0 | Status: SHIPPED | OUTPATIENT
Start: 2022-09-19 | End: 2022-10-06 | Stop reason: SDUPTHER

## 2022-09-19 RX ORDER — LORAZEPAM 1 MG/1
1 TABLET ORAL EVERY 4 HOURS PRN
Qty: 60 TABLET | Refills: 2 | Status: SHIPPED | OUTPATIENT
Start: 2022-09-19 | End: 2022-10-19

## 2022-10-06 DIAGNOSIS — Z51.5 HOSPICE CARE PATIENT: Primary | ICD-10-CM

## 2022-10-06 DIAGNOSIS — Z51.5 END OF LIFE CARE: ICD-10-CM

## 2022-10-06 DIAGNOSIS — G89.3 NEOPLASM RELATED PAIN: ICD-10-CM

## 2022-10-06 DIAGNOSIS — G89.3 PAIN DUE TO NEOPLASM: ICD-10-CM

## 2022-10-06 RX ORDER — METHADONE HYDROCHLORIDE 5 MG/1
5 TABLET ORAL NIGHTLY
Qty: 30 TABLET | Refills: 0 | Status: SHIPPED | OUTPATIENT
Start: 2022-10-06 | End: 2022-10-14 | Stop reason: SDUPTHER

## 2022-10-06 RX ORDER — OXYCODONE HYDROCHLORIDE 10 MG/1
10 TABLET ORAL EVERY 4 HOURS PRN
Qty: 90 TABLET | Refills: 0 | Status: SHIPPED | OUTPATIENT
Start: 2022-10-06 | End: 2022-10-20

## 2022-10-14 DIAGNOSIS — Z51.5 HOSPICE CARE PATIENT: ICD-10-CM

## 2022-10-14 DIAGNOSIS — G89.3 NEOPLASM RELATED PAIN: ICD-10-CM

## 2022-10-14 RX ORDER — METHADONE HYDROCHLORIDE 5 MG/1
5 TABLET ORAL 2 TIMES DAILY
Qty: 60 TABLET | Refills: 0 | Status: SHIPPED | OUTPATIENT
Start: 2022-10-14 | End: 2022-10-20 | Stop reason: SDUPTHER

## 2022-10-20 DIAGNOSIS — Z51.5 HOSPICE CARE PATIENT: ICD-10-CM

## 2022-10-20 DIAGNOSIS — R09.89 AIR HUNGER: Primary | ICD-10-CM

## 2022-10-20 DIAGNOSIS — G89.3 NEOPLASM RELATED PAIN: ICD-10-CM

## 2022-10-20 RX ORDER — METHADONE HYDROCHLORIDE 5 MG/1
5 TABLET ORAL 3 TIMES DAILY
Qty: 90 TABLET | Refills: 0 | Status: SHIPPED | OUTPATIENT
Start: 2022-10-20 | End: 2022-11-19

## 2022-10-20 RX ORDER — HYDROMORPHONE HYDROCHLORIDE 2 MG/1
3 TABLET ORAL EVERY 4 HOURS PRN
Qty: 60 TABLET | Refills: 0 | Status: SHIPPED | OUTPATIENT
Start: 2022-10-20 | End: 2022-10-26 | Stop reason: SDUPTHER

## 2022-10-21 DIAGNOSIS — Z51.5 HOSPICE CARE PATIENT: Primary | ICD-10-CM

## 2022-10-21 DIAGNOSIS — F41.9 ANXIETY: ICD-10-CM

## 2022-10-21 RX ORDER — LORAZEPAM 1 MG/1
1 TABLET ORAL EVERY 4 HOURS PRN
Qty: 30 TABLET | Refills: 3 | Status: SHIPPED | OUTPATIENT
Start: 2022-10-21 | End: 2022-11-20

## 2022-10-26 DIAGNOSIS — R09.89 AIR HUNGER: ICD-10-CM

## 2022-10-26 DIAGNOSIS — G89.3 NEOPLASM RELATED PAIN: ICD-10-CM

## 2022-10-26 DIAGNOSIS — Z51.5 HOSPICE CARE PATIENT: ICD-10-CM

## 2022-10-26 RX ORDER — HYDROMORPHONE HYDROCHLORIDE 2 MG/1
3 TABLET ORAL EVERY 4 HOURS PRN
Qty: 90 TABLET | Refills: 0 | Status: SHIPPED | OUTPATIENT
Start: 2022-10-26 | End: 2022-11-03 | Stop reason: SDUPTHER

## 2022-11-03 DIAGNOSIS — Z51.5 HOSPICE CARE PATIENT: ICD-10-CM

## 2022-11-03 DIAGNOSIS — R09.89 AIR HUNGER: ICD-10-CM

## 2022-11-03 DIAGNOSIS — G89.3 NEOPLASM RELATED PAIN: ICD-10-CM

## 2022-11-03 RX ORDER — HYDROMORPHONE HYDROCHLORIDE 2 MG/1
3 TABLET ORAL EVERY 4 HOURS PRN
Qty: 90 TABLET | Refills: 0 | Status: SHIPPED | OUTPATIENT
Start: 2022-11-03 | End: 2022-11-11 | Stop reason: SDUPTHER

## 2022-11-11 DIAGNOSIS — Z51.5 HOSPICE CARE PATIENT: ICD-10-CM

## 2022-11-11 DIAGNOSIS — G89.3 NEOPLASM RELATED PAIN: ICD-10-CM

## 2022-11-11 DIAGNOSIS — R09.89 AIR HUNGER: ICD-10-CM

## 2022-11-11 RX ORDER — HYDROMORPHONE HYDROCHLORIDE 2 MG/1
3 TABLET ORAL EVERY 4 HOURS PRN
Qty: 90 TABLET | Refills: 0 | Status: SHIPPED | OUTPATIENT
Start: 2022-11-11 | End: 2022-12-11

## 2022-11-16 DIAGNOSIS — G89.3 NEOPLASM RELATED PAIN: ICD-10-CM

## 2022-11-16 DIAGNOSIS — Z51.5 HOSPICE CARE PATIENT: ICD-10-CM

## 2022-11-16 RX ORDER — METHADONE HYDROCHLORIDE 5 MG/1
5 TABLET ORAL 3 TIMES DAILY
Qty: 90 TABLET | Refills: 0 | Status: SHIPPED | OUTPATIENT
Start: 2022-11-16 | End: 2022-12-16

## 2023-04-25 NOTE — PROGRESS NOTES
Alert and oriented x3, sitter discontinued Body Location Override (Optional - Billing Will Still Be Based On Selected Body Map Location If Applicable): [place]

## 2025-06-11 NOTE — PROGRESS NOTES
Hospitalist Progress Note      SYNOPSIS: Patient admitted on 7/25/2022     81-year-old female patient from home with medical history of severe pulmonary hypertension, tobacco use, right breast cancer, COPD, MDD, hypertension who was brought to the ED with concern for stroke. Patient last known normal well was around midnight last night. Family found her today around noon with left-sided weakness and facial droop. Upon EMS arrival at the scene patient had saturation of 56% on room air and placed on nonrebreather mask. Patient was recently diagnosed with breast cancer 10 days ago for which she was discharged from this facility. No report of fever, cough, CP, abdominal pain, n/v.   Stroke alert was activated on arrival to ED. CT scanning of the head showed subacute infarction described as area of ischemia in the right parietal and left occipital lobes. No hemorrhagic, significant mild static, or midline shift. At CTA of the head and neck obtained with no major intracranial arterial stenosis but 70% stenosis of the right proximal ICA. Telestroke was consulted and patient was out of the window for tPA given completed infarct, also per neuro recommendation patient with likely hypercoagulable state this ICA daily managed medically, with DAPT. Patient was given aspirin on the ED  Remaining ED work-up showing normal white blood cells, hemoglobin, elevated hematocrit, potassium 6.4, bicarb 32, BUN 73, creatinine 2.1, ABG showing pH 7.23, PCO2 76.7, PO2 202, troponin 66, and proBNP 17,428. EKG sinus bradycardia 59, normal QT, no ST segment changes. CTA pulmonary with contrast with no evidence of PE.   Redemonstration of right breast mass with right axillary lymphadenopathy and multiple enlarged mediastinal lymph nodes  Patient was admitted for further management of subacute stroke, acute hypercapnic and hypoxic respiratory failure     SUBJECTIVE:    Patient seen and examined at bedside, chest awake and alert, off restraints, participating in care and ready to eat her breakfast   Patient states she has no metal implants in her body, she only have dentures. By exam she has no pacemaker or ICD . Denies chest pain, abdominal pain, shortness of breath, cough or wheezing, nausea or vomiting  Brain is pending     records reviewed. Stable overnight. No other overnight issues reported. Temp (24hrs), Av.1 °F (36.2 °C), Min:96.4 °F (35.8 °C), Max:97.9 °F (36.6 °C)    DIET: ADULT DIET; Regular; Low Sodium (2 gm)  CODE: Prior    Intake/Output Summary (Last 24 hours) at 2022 0812  Last data filed at 2022 0335  Gross per 24 hour   Intake 300 ml   Output 575 ml   Net -275 ml       OBJECTIVE:    /67   Pulse (!) 49   Temp 97.9 °F (36.6 °C) (Temporal)   Resp 16   Ht 5' 7\" (1.702 m)   Wt 114 lb (51.7 kg)   SpO2 97%   BMI 17.85 kg/m²     General appearance: No apparent distress, appears stated age and cooperative. HEENT: Normal cephalic, atraumatic without obvious deformity. Pupils equal, round, and reactive to light. Extra ocular muscles intact. Conjunctivae/corneas clear. Neck: Supple, with full range of motion. No jugular venous distention. Trachea midline. Respiratory: Creased respiratory effort, on BiPAP, expiratory wheezing, no crackles  Cardiovascular: Bradycardic, no murmur gallops or rubs  Abdomen: Nondistended, normal BS, nontender, no visceromegaly  Musculoskeletal: No clubbing, cyanosis, right foot erythema and edema. Brisk capillary refill. Skin: Normal skin color. No rashes or lesions. Neurologic:  Neurovascularly intact without any focal sensory/motor deficits.  Cranial nerves: II-XII intact, grossly non-focal.  Left-sided hemiparesis and left-sided lower facial droop  Psychiatric: Alert and oriented, thought content appropriate, normal insight       ASSESSMENT:    Acute ischemic stroke  Acute hypoxic and hypercapnic respiratory failure  Hyper kalemia  Acute respiratory acidosis  DIANN  Elevated troponin in the setting of acute respiratory failure and a stroke. -unlikely ACS, likely demand ischemia  Recent diagnosis of right breast cancer. Status post ultrasound-guided core biopsy of the right axillary lymphadenopathy. 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 Cosyntropin stimulation test showed normal cortisol but obtained received steroids on ED     PLAN:      -Brain MRI with and without contrast. Stroke vs brain mets   -Neurology evaluation  -Breathing treatments due neb scheduled, steroids solumedrol 125 mg stat dose ordered f/u by 20 mg q 12 hrs. Doxycycline x 5 days. Pulmonary consult  -BiPAP QHS and daytime as needed   -Continue aspirin. Start Plavix. Statins  -Lovenox for DVT prophylaxis  - IVF, carefully monitor hemodynamics and renal function. Got 1 L NS bolus on ED. Continue NS at 100 cc/ min  - Treat hyperkalemia and monitor K  -Consult oncology for recommendations  -Right lower extremity venous arterial Dopplers  -ABG results from today noted. Recommend BiPAP for 2 hours and repeat ABG  - Palliative care consult for Alicia 65, code status  -Start ceftriaxone for UTI.   Urine culture sent  -MRI of brain still pending         DISPOSITION: likely home medically cleared     Medications:  REVIEWED DAILY    Infusion Medications    dextrose 100 mL/hr at 07/26/22 1356    dextrose      dextrose       Scheduled Medications    cefTRIAXone (ROCEPHIN) IV  1,000 mg IntraVENous Q24H    methylPREDNISolone  20 mg IntraVENous Daily    budesonide  500 mcg Nebulization BID    Arformoterol Tartrate  15 mcg Nebulization BID    melatonin  5 mg Oral Nightly    aspirin  324 mg Oral Once    sodium zirconium HPI:  *History obtained from chart review and son Maldonado.     71 year old woman with a PMHx of Asthma (on prn supp O2), COPD (prev smoker quit 20 years ago), HTN, HFpEF, CVA with L sided deficits (as per son), A Fib, PE (off Eliquis and Aspirin since March 2025 due to abdominal wall hematoma), and Liver mass (IR recommended for outpatient follow up w repeat imaging).     Pt presented to the ED with SOB that started the night prior. Per daughter in law, pt has been declining in her health, decreased appetite since her last hospital admission in March 2025. Has been bed bound last 3 years. In the ED, pt tripoding and was placed on BIPAP. Received nebs and steroids. Pt then developed episode of coffee ground vomiting and switch to high flow NC. Labs significant for wbc 11.8, hgb 8.6, Na 121, K: 5.8, Bicarb 20, Creat: 1.38, trop: 62, pH: 7.11, pCO2: 57.   Pt developed hypotension and received 1 L of IV fluids with some improvement. Admitted to the ICU for further management.        (08 Jun 2025 20:54)      24 hr events: Downgraded to medicine, had episode of respiratory distress w/wheezing so place back on NIPPV and brought back to ICU    ## ROS:  [x ] unable to obtain    ## Vitals  ICU Vital Signs Last 24 Hrs  T(C): 36 (11 Jun 2025 08:00), Max: 36.8 (10 Joseph 2025 21:23)  T(F): 96.8 (11 Jun 2025 08:00), Max: 98.2 (10 Joseph 2025 21:23)  HR: 106 (11 Jun 2025 08:40) (73 - 122)  BP: 117/51 (11 Jun 2025 08:00) (106/50 - 138/76)  BP(mean): 69 (11 Jun 2025 08:00) (66 - 83)  ABP: --  ABP(mean): --  RR: 22 (11 Jun 2025 08:23) (15 - 25)  SpO2: 98% (11 Jun 2025 08:40) (91% - 100%)    O2 Parameters below as of 11 Jun 2025 08:23  Patient On (Oxygen Delivery Method): nasal cannula  O2 Flow (L/min): 4          ## Physical Exam:  Gen: Adult female lying in bed, NAD  HEENT: NC/AT sclerae anicteric.   Resp:  Poor air movement b/l. No w/c/r  CV: S1, S2  Abd: Soft, + BS  Ext: WWP  Neuro: Awake, confused    ## Vent Data      ## Labs:  Chem:  06-11    132[L]  |  102  |  38[H]  ----------------------------<  154[H]  4.7   |  24  |  1.84[H]    Ca    8.7      11 Jun 2025 04:36  Phos  5.4     06-11  Mg     2.7     06-11    TPro  6.6  /  Alb  2.8[L]  /  TBili  0.3  /  DBili  x   /  AST  11[L]  /  ALT  16  /  AlkPhos  87  06-11    LIVER FUNCTIONS - ( 11 Jun 2025 04:36 )  Alb: 2.8 g/dL / Pro: 6.6 gm/dL / ALK PHOS: 87 U/L / ALT: 16 U/L / AST: 11 U/L / GGT: x           CBC:                        7.8    11.92 )-----------( 243      ( 11 Jun 2025 04:36 )             27.0     Coags:      Urinalysis Basic - ( 11 Jun 2025 04:36 )    Color: x / Appearance: x / SG: x / pH: x  Gluc: 154 mg/dL / Ketone: x  / Bili: x / Urobili: x   Blood: x / Protein: x / Nitrite: x   Leuk Esterase: x / RBC: x / WBC x   Sq Epi: x / Non Sq Epi: x / Bacteria: x        ## Cardiac        ## Blood Gas  ABG - ( 10 Joseph 2025 22:58 )  pH, Arterial: 7.27  pH, Blood: x     /  pCO2: 50    /  pO2: 77    / HCO3: 23    / Base Excess: -4.3  /  SaO2: 95.8                #I/Os  I&O's Detail    10 Joseph 2025 07:01  -  11 Jun 2025 07:00  --------------------------------------------------------  IN:  Total IN: 0 mL    OUT:    Intermittent Catheterization - Urethral (mL): 515 mL    Voided (mL): 1000 mL  Total OUT: 1515 mL    Total NET: -1515 mL          ## Imaging:    ## Medications:  MEDICATIONS  (STANDING):  albuterol/ipratropium for Nebulization 3 milliLiter(s) Nebulizer every 6 hours  azithromycin   Tablet 500 milliGRAM(s) Oral every 24 hours  chlorhexidine 2% Cloths 1 Application(s) Topical <User Schedule>  fluticasone propionate/ salmeterol 500-50 MICROgram(s) Diskus 1 Dose(s) Inhalation two times a day  heparin   Injectable 5000 Unit(s) SubCutaneous every 8 hours  methylPREDNISolone sodium succinate Injectable 40 milliGRAM(s) IV Push every 12 hours  montelukast 10 milliGRAM(s) Oral daily  pantoprazole    Tablet 40 milliGRAM(s) Oral before breakfast  saline laxative (FLEET) Rectal Enema 1 Enema Rectal once  sodium chloride 3%  Inhalation 4 milliLiter(s) Inhalation every 6 hours    MEDICATIONS  (PRN):  albuterol/ipratropium for Nebulization 3 milliLiter(s) Nebulizer every 6 hours PRN Shortness of Breath and/or Wheezing  guaiFENesin Oral Liquid (Sugar-Free) 200 milliGRAM(s) Oral every 6 hours PRN Cough       cyclosilicate  5 g Oral TID    enoxaparin  30 mg SubCUTAneous Daily    ipratropium-albuterol  1 ampule Inhalation Q4H WA    aspirin  81 mg Oral Daily    clopidogrel  75 mg Oral Daily    rosuvastatin  20 mg Oral Nightly     PRN Meds: LORazepam, glucose, dextrose bolus **OR** dextrose bolus, glucagon (rDNA), dextrose, glucose, dextrose bolus **OR** dextrose bolus, glucagon (rDNA), dextrose, glucose, dextrose bolus **OR** dextrose bolus, glucagon (rDNA), dextrose, [COMPLETED] insulin regular **AND** [COMPLETED] dextrose **AND** POCT Glucose **AND** POCT Glucose **AND** dextrose    Labs:     Recent Labs     07/25/22  1441 07/27/22  1459   WBC 10.4 14.1*   HGB 14.6 14.9   HCT 48.3* 49.7*    114*       Recent Labs     07/25/22  1937 07/26/22  0610 07/26/22  1538 07/26/22  2332 07/27/22  0611 07/28/22  0605   NA  --  140  --   --  142 140   K 5.4* 6.0*   < > 4.7 4.7 4.5   CL  --  99  --   --  96* 95*   CO2  --  29  --   --  34* 38*   BUN  --  59*  --   --  41* 37*   CREATININE  --  1.6*  --   --  0.9 0.9   CALCIUM  --  8.8  --   --  9.1 8.5*   PHOS 5.0*  --   --   --   --   --     < > = values in this interval not displayed.        Recent Labs     07/26/22  0610 07/27/22  0611 07/28/22  0605   PROT 6.2* 6.7 5.5*   ALKPHOS 93 99 77   * 289* 175*   * 89* 43*   BILITOT 1.8* 2.1* 1.2       Recent Labs     07/25/22  1405   INR 2.0       Recent Labs     07/25/22  1441   CKTOTAL 31       Chronic labs:    Lab Results   Component Value Date    CHOL 170 07/26/2022    TRIG 121 07/26/2022    HDL 36 07/26/2022    LDLCALC 110 (H) 07/26/2022    TSH 2.510 07/14/2022    INR 2.0 07/25/2022    LABA1C 5.8 (H) 07/26/2022       Radiology: REVIEWED DAILY    +++++++++++++++++++++++++++++++++++++++++++++++++  Nicole Goodwin MD  [employer] - [address] [place] [address]  +++++++++++++++++++++++++++++++++++++++++++++++++  NOTE: This report was transcribed using voice recognition software. Every effort was made to ensure accuracy; however, inadvertent computerized transcription errors may be present.